# Patient Record
Sex: MALE | Race: WHITE | NOT HISPANIC OR LATINO | Employment: OTHER | URBAN - METROPOLITAN AREA
[De-identification: names, ages, dates, MRNs, and addresses within clinical notes are randomized per-mention and may not be internally consistent; named-entity substitution may affect disease eponyms.]

---

## 2017-12-28 ENCOUNTER — HOSPITAL ENCOUNTER (OUTPATIENT)
Facility: MEDICAL CENTER | Age: 62
DRG: 982 | End: 2017-12-28
Payer: MEDICARE

## 2017-12-28 ENCOUNTER — HOSPITAL ENCOUNTER (INPATIENT)
Facility: MEDICAL CENTER | Age: 62
LOS: 12 days | DRG: 982 | End: 2018-01-09
Attending: INTERNAL MEDICINE | Admitting: INTERNAL MEDICINE
Payer: MEDICARE

## 2017-12-28 ENCOUNTER — APPOINTMENT (OUTPATIENT)
Dept: RADIOLOGY | Facility: MEDICAL CENTER | Age: 62
DRG: 982 | End: 2017-12-28
Attending: INTERNAL MEDICINE
Payer: MEDICARE

## 2017-12-28 ENCOUNTER — RESOLUTE PROFESSIONAL BILLING HOSPITAL PROF FEE (OUTPATIENT)
Dept: HOSPITALIST | Facility: MEDICAL CENTER | Age: 62
End: 2017-12-28
Payer: MEDICARE

## 2017-12-28 DIAGNOSIS — L97.519 FOOT ULCER, RIGHT, WITH UNSPECIFIED SEVERITY (HCC): ICD-10-CM

## 2017-12-28 DIAGNOSIS — M86.271 SUBACUTE OSTEOMYELITIS OF RIGHT FOOT (HCC): ICD-10-CM

## 2017-12-28 DIAGNOSIS — M86.9 OSTEOMYELITIS OF RIGHT FOOT, UNSPECIFIED TYPE (HCC): ICD-10-CM

## 2017-12-28 PROBLEM — Z79.01 CHRONIC ANTICOAGULATION: Status: ACTIVE | Noted: 2017-12-28

## 2017-12-28 LAB
ANION GAP SERPL CALC-SCNC: 8 MMOL/L (ref 0–11.9)
BASOPHILS # BLD AUTO: 0.7 % (ref 0–1.8)
BASOPHILS # BLD: 0.07 K/UL (ref 0–0.12)
BUN SERPL-MCNC: 11 MG/DL (ref 8–22)
CALCIUM SERPL-MCNC: 9.3 MG/DL (ref 8.5–10.5)
CHLORIDE SERPL-SCNC: 104 MMOL/L (ref 96–112)
CO2 SERPL-SCNC: 25 MMOL/L (ref 20–33)
CREAT SERPL-MCNC: 0.83 MG/DL (ref 0.5–1.4)
CRP SERPL HS-MCNC: 0.52 MG/DL (ref 0–0.75)
EOSINOPHIL # BLD AUTO: 0.32 K/UL (ref 0–0.51)
EOSINOPHIL NFR BLD: 3.3 % (ref 0–6.9)
ERYTHROCYTE [DISTWIDTH] IN BLOOD BY AUTOMATED COUNT: 44.1 FL (ref 35.9–50)
ERYTHROCYTE [SEDIMENTATION RATE] IN BLOOD BY WESTERGREN METHOD: 31 MM/HOUR (ref 0–20)
EST. AVERAGE GLUCOSE BLD GHB EST-MCNC: 232 MG/DL
GFR SERPL CREATININE-BSD FRML MDRD: >60 ML/MIN/1.73 M 2
GLUCOSE BLD-MCNC: 185 MG/DL (ref 65–99)
GLUCOSE BLD-MCNC: 212 MG/DL (ref 65–99)
GLUCOSE SERPL-MCNC: 163 MG/DL (ref 65–99)
HBA1C MFR BLD: 9.7 % (ref 0–5.6)
HCT VFR BLD AUTO: 45 % (ref 42–52)
HGB BLD-MCNC: 15 G/DL (ref 14–18)
IMM GRANULOCYTES # BLD AUTO: 0.07 K/UL (ref 0–0.11)
IMM GRANULOCYTES NFR BLD AUTO: 0.7 % (ref 0–0.9)
INR PPP: 1.49 (ref 0.87–1.13)
LYMPHOCYTES # BLD AUTO: 2.76 K/UL (ref 1–4.8)
LYMPHOCYTES NFR BLD: 28.2 % (ref 22–41)
MCH RBC QN AUTO: 28.2 PG (ref 27–33)
MCHC RBC AUTO-ENTMCNC: 33.3 G/DL (ref 33.7–35.3)
MCV RBC AUTO: 84.7 FL (ref 81.4–97.8)
MONOCYTES # BLD AUTO: 0.65 K/UL (ref 0–0.85)
MONOCYTES NFR BLD AUTO: 6.7 % (ref 0–13.4)
NEUTROPHILS # BLD AUTO: 5.9 K/UL (ref 1.82–7.42)
NEUTROPHILS NFR BLD: 60.4 % (ref 44–72)
NRBC # BLD AUTO: 0 K/UL
NRBC BLD-RTO: 0 /100 WBC
PLATELET # BLD AUTO: 232 K/UL (ref 164–446)
PMV BLD AUTO: 9.3 FL (ref 9–12.9)
POTASSIUM SERPL-SCNC: 3.9 MMOL/L (ref 3.6–5.5)
PREALB SERPL-MCNC: 25 MG/DL (ref 18–38)
PROTHROMBIN TIME: 17.7 SEC (ref 12–14.6)
RBC # BLD AUTO: 5.31 M/UL (ref 4.7–6.1)
SODIUM SERPL-SCNC: 137 MMOL/L (ref 135–145)
WBC # BLD AUTO: 9.8 K/UL (ref 4.8–10.8)

## 2017-12-28 PROCEDURE — 700111 HCHG RX REV CODE 636 W/ 250 OVERRIDE (IP): Performed by: INTERNAL MEDICINE

## 2017-12-28 PROCEDURE — 36415 COLL VENOUS BLD VENIPUNCTURE: CPT

## 2017-12-28 PROCEDURE — 85652 RBC SED RATE AUTOMATED: CPT

## 2017-12-28 PROCEDURE — 99223 1ST HOSP IP/OBS HIGH 75: CPT | Mod: AI | Performed by: INTERNAL MEDICINE

## 2017-12-28 PROCEDURE — 84134 ASSAY OF PREALBUMIN: CPT

## 2017-12-28 PROCEDURE — 94760 N-INVAS EAR/PLS OXIMETRY 1: CPT

## 2017-12-28 PROCEDURE — 83036 HEMOGLOBIN GLYCOSYLATED A1C: CPT

## 2017-12-28 PROCEDURE — 86140 C-REACTIVE PROTEIN: CPT

## 2017-12-28 PROCEDURE — 73630 X-RAY EXAM OF FOOT: CPT | Mod: RT

## 2017-12-28 PROCEDURE — 770006 HCHG ROOM/CARE - MED/SURG/GYN SEMI*

## 2017-12-28 PROCEDURE — 82962 GLUCOSE BLOOD TEST: CPT

## 2017-12-28 PROCEDURE — A9270 NON-COVERED ITEM OR SERVICE: HCPCS | Performed by: INTERNAL MEDICINE

## 2017-12-28 PROCEDURE — 80048 BASIC METABOLIC PNL TOTAL CA: CPT

## 2017-12-28 PROCEDURE — 85610 PROTHROMBIN TIME: CPT

## 2017-12-28 PROCEDURE — 85025 COMPLETE CBC W/AUTO DIFF WBC: CPT

## 2017-12-28 PROCEDURE — 700102 HCHG RX REV CODE 250 W/ 637 OVERRIDE(OP): Performed by: INTERNAL MEDICINE

## 2017-12-28 RX ORDER — ONDANSETRON 4 MG/1
4 TABLET, ORALLY DISINTEGRATING ORAL EVERY 4 HOURS PRN
Status: DISCONTINUED | OUTPATIENT
Start: 2017-12-28 | End: 2018-01-09 | Stop reason: HOSPADM

## 2017-12-28 RX ORDER — PROMETHAZINE HYDROCHLORIDE 12.5 MG/1
12.5-25 SUPPOSITORY RECTAL EVERY 4 HOURS PRN
Status: DISCONTINUED | OUTPATIENT
Start: 2017-12-28 | End: 2018-01-09 | Stop reason: HOSPADM

## 2017-12-28 RX ORDER — ISOSORBIDE DINITRATE 10 MG/1
20 TABLET ORAL DAILY
Status: DISCONTINUED | OUTPATIENT
Start: 2017-12-29 | End: 2018-01-09 | Stop reason: HOSPADM

## 2017-12-28 RX ORDER — PROMETHAZINE HYDROCHLORIDE 25 MG/1
12.5-25 TABLET ORAL EVERY 4 HOURS PRN
Status: DISCONTINUED | OUTPATIENT
Start: 2017-12-28 | End: 2018-01-09 | Stop reason: HOSPADM

## 2017-12-28 RX ORDER — WARFARIN SODIUM 10 MG/1
10 TABLET ORAL DAILY
COMMUNITY

## 2017-12-28 RX ORDER — DEXTROSE MONOHYDRATE 25 G/50ML
25 INJECTION, SOLUTION INTRAVENOUS
Status: DISCONTINUED | OUTPATIENT
Start: 2017-12-28 | End: 2018-01-09 | Stop reason: HOSPADM

## 2017-12-28 RX ORDER — ONDANSETRON 2 MG/ML
4 INJECTION INTRAMUSCULAR; INTRAVENOUS EVERY 4 HOURS PRN
Status: DISCONTINUED | OUTPATIENT
Start: 2017-12-28 | End: 2018-01-09 | Stop reason: HOSPADM

## 2017-12-28 RX ORDER — CLOPIDOGREL BISULFATE 75 MG/1
75 TABLET ORAL DAILY
Status: DISCONTINUED | OUTPATIENT
Start: 2017-12-28 | End: 2018-01-09 | Stop reason: HOSPADM

## 2017-12-28 RX ORDER — NIFEDIPINE 30 MG/1
30 TABLET, EXTENDED RELEASE ORAL DAILY
Status: DISCONTINUED | OUTPATIENT
Start: 2017-12-29 | End: 2018-01-09 | Stop reason: HOSPADM

## 2017-12-28 RX ORDER — PENTOXIFYLLINE 400 MG/1
400 TABLET, EXTENDED RELEASE ORAL
Status: DISCONTINUED | OUTPATIENT
Start: 2017-12-28 | End: 2018-01-09 | Stop reason: HOSPADM

## 2017-12-28 RX ORDER — METRONIDAZOLE 500 MG/1
500 TABLET ORAL EVERY 8 HOURS
Status: DISCONTINUED | OUTPATIENT
Start: 2017-12-28 | End: 2017-12-28

## 2017-12-28 RX ORDER — GLIMEPIRIDE 2 MG/1
2 TABLET ORAL 2 TIMES DAILY
COMMUNITY

## 2017-12-28 RX ORDER — AMOXICILLIN 250 MG
2 CAPSULE ORAL 2 TIMES DAILY
Status: DISCONTINUED | OUTPATIENT
Start: 2017-12-28 | End: 2018-01-09 | Stop reason: HOSPADM

## 2017-12-28 RX ORDER — SULFAMETHOXAZOLE AND TRIMETHOPRIM 400; 80 MG/1; MG/1
1 TABLET ORAL 2 TIMES DAILY
Status: ON HOLD | COMMUNITY
End: 2018-01-09

## 2017-12-28 RX ORDER — POLYETHYLENE GLYCOL 3350 17 G/17G
1 POWDER, FOR SOLUTION ORAL
Status: DISCONTINUED | OUTPATIENT
Start: 2017-12-28 | End: 2018-01-09 | Stop reason: HOSPADM

## 2017-12-28 RX ORDER — INSULIN GLARGINE 100 [IU]/ML
24 INJECTION, SOLUTION SUBCUTANEOUS EVERY EVENING
Status: DISCONTINUED | OUTPATIENT
Start: 2017-12-28 | End: 2018-01-02

## 2017-12-28 RX ORDER — PANTOPRAZOLE SODIUM 40 MG/1
40 TABLET, DELAYED RELEASE ORAL DAILY
Status: ON HOLD | COMMUNITY
End: 2018-01-09

## 2017-12-28 RX ORDER — ATENOLOL 50 MG/1
50 TABLET ORAL 3 TIMES DAILY
Status: DISCONTINUED | OUTPATIENT
Start: 2017-12-28 | End: 2018-01-06

## 2017-12-28 RX ORDER — AMOXICILLIN AND CLAVULANATE POTASSIUM 875; 125 MG/1; MG/1
1 TABLET, FILM COATED ORAL 2 TIMES DAILY
Status: ON HOLD | COMMUNITY
End: 2018-01-09

## 2017-12-28 RX ORDER — ACETAMINOPHEN 325 MG/1
650 TABLET ORAL EVERY 6 HOURS PRN
Status: DISCONTINUED | OUTPATIENT
Start: 2017-12-28 | End: 2018-01-09 | Stop reason: HOSPADM

## 2017-12-28 RX ORDER — FUROSEMIDE 40 MG/1
10 TABLET ORAL DAILY
Status: ON HOLD | COMMUNITY
End: 2018-01-09

## 2017-12-28 RX ORDER — FUROSEMIDE 20 MG/1
10 TABLET ORAL DAILY
Status: DISCONTINUED | OUTPATIENT
Start: 2017-12-29 | End: 2018-01-09 | Stop reason: HOSPADM

## 2017-12-28 RX ORDER — GABAPENTIN 100 MG/1
100 CAPSULE ORAL 3 TIMES DAILY
Status: DISCONTINUED | OUTPATIENT
Start: 2017-12-28 | End: 2018-01-09 | Stop reason: HOSPADM

## 2017-12-28 RX ORDER — PANTOPRAZOLE SODIUM 40 MG/1
40 TABLET, DELAYED RELEASE ORAL DAILY
Status: DISCONTINUED | OUTPATIENT
Start: 2017-12-28 | End: 2017-12-28

## 2017-12-28 RX ORDER — ATORVASTATIN CALCIUM 20 MG/1
20 TABLET, FILM COATED ORAL
Status: DISCONTINUED | OUTPATIENT
Start: 2017-12-28 | End: 2018-01-09 | Stop reason: HOSPADM

## 2017-12-28 RX ORDER — OMEPRAZOLE 20 MG/1
20 CAPSULE, DELAYED RELEASE ORAL DAILY
Status: DISCONTINUED | OUTPATIENT
Start: 2017-12-29 | End: 2018-01-09 | Stop reason: HOSPADM

## 2017-12-28 RX ORDER — BISACODYL 10 MG
10 SUPPOSITORY, RECTAL RECTAL
Status: DISCONTINUED | OUTPATIENT
Start: 2017-12-28 | End: 2018-01-09 | Stop reason: HOSPADM

## 2017-12-28 RX ADMIN — INSULIN HUMAN 1 UNITS: 100 INJECTION, SOLUTION PARENTERAL at 21:13

## 2017-12-28 RX ADMIN — INSULIN HUMAN 2 UNITS: 100 INJECTION, SOLUTION PARENTERAL at 16:41

## 2017-12-28 RX ADMIN — INSULIN GLARGINE 24 UNITS: 100 INJECTION, SOLUTION SUBCUTANEOUS at 21:14

## 2017-12-28 RX ADMIN — ENOXAPARIN SODIUM 100 MG: 100 INJECTION SUBCUTANEOUS at 21:13

## 2017-12-28 RX ADMIN — ATORVASTATIN CALCIUM 20 MG: 20 TABLET, FILM COATED ORAL at 21:14

## 2017-12-28 RX ADMIN — ATENOLOL 50 MG: 50 TABLET ORAL at 21:14

## 2017-12-28 RX ADMIN — GABAPENTIN 100 MG: 100 CAPSULE ORAL at 14:08

## 2017-12-28 RX ADMIN — STANDARDIZED SENNA CONCENTRATE AND DOCUSATE SODIUM 2 TABLET: 8.6; 5 TABLET, FILM COATED ORAL at 21:14

## 2017-12-28 RX ADMIN — ATENOLOL 50 MG: 50 TABLET ORAL at 14:08

## 2017-12-28 RX ADMIN — CLOPIDOGREL 75 MG: 75 TABLET, FILM COATED ORAL at 21:14

## 2017-12-28 RX ADMIN — PENTOXIFYLLINE 400 MG: 400 TABLET, FILM COATED, EXTENDED RELEASE ORAL at 16:41

## 2017-12-28 RX ADMIN — GABAPENTIN 100 MG: 100 CAPSULE ORAL at 21:14

## 2017-12-28 ASSESSMENT — ENCOUNTER SYMPTOMS
WEIGHT LOSS: 0
ABDOMINAL PAIN: 0
VOMITING: 0
SHORTNESS OF BREATH: 0
CHILLS: 0
NAUSEA: 0
BLURRED VISION: 0
FOCAL WEAKNESS: 0
DIZZINESS: 0
MYALGIAS: 0
LOSS OF CONSCIOUSNESS: 0
DEPRESSION: 0
PALPITATIONS: 0
HEMOPTYSIS: 0
FEVER: 0
SPUTUM PRODUCTION: 0

## 2017-12-28 ASSESSMENT — LIFESTYLE VARIABLES
PACK_YEARS: 20
EVER_SMOKED: YES
ALCOHOL_USE: NO
EVER_SMOKED: YES

## 2017-12-28 ASSESSMENT — PATIENT HEALTH QUESTIONNAIRE - PHQ9
2. FEELING DOWN, DEPRESSED, IRRITABLE, OR HOPELESS: NOT AT ALL
SUM OF ALL RESPONSES TO PHQ QUESTIONS 1-9: 0
1. LITTLE INTEREST OR PLEASURE IN DOING THINGS: NOT AT ALL
SUM OF ALL RESPONSES TO PHQ9 QUESTIONS 1 AND 2: 0

## 2017-12-28 ASSESSMENT — PAIN SCALES - GENERAL
PAINLEVEL_OUTOF10: 0
PAINLEVEL_OUTOF10: 0

## 2017-12-28 ASSESSMENT — COPD QUESTIONNAIRES
COPD SCREENING SCORE: 4
DURING THE PAST 4 WEEKS HOW MUCH DID YOU FEEL SHORT OF BREATH: NONE/LITTLE OF THE TIME
HAVE YOU SMOKED AT LEAST 100 CIGARETTES IN YOUR ENTIRE LIFE: YES
DO YOU EVER COUGH UP ANY MUCUS OR PHLEGM?: NO/ONLY WITH OCCASIONAL COLDS OR INFECTIONS

## 2017-12-28 NOTE — CONSULTS
Diabetes Education:  Barrett states that he had diabetes education when he was here 2/26/16.  He states he has been running high at home.  He has a new doctor that is trying to help get his blood sugar under control.  He was just taking Metformin and she added glipizide.  I reviewed the new medications including SGLT 2 inhibitors and GLP 1.  He did not want insulin and states he won't take it.  I showed him how small the needles are now and he states he feel better.  I reviewed all basic education including diet, exercise, medications, checking blood sugars, signs/symptoms and treatment of hypoglycemia, daily foot care, and need for follow up care.  We will do insulin education tomorrow when he is settled and has gotten his first injections in the hospital.  He states he does not have an appetite and is only eating once daily at home.  He was supplied an One Touch Verio Flex meter and was able to do a return demonstration without difficulty.  He states he will need to get set up with a [employer] order company since he lives in [place].  He will need a prescription for the One Touch Verio test strips and Delica lancets.

## 2017-12-28 NOTE — ASSESSMENT & PLAN NOTE
- S/p toe amputations.   - Await SNF placement for long term antibiotics (IV daptomycin) until 2/9/18.  - continue wound care per LPS.   - Continue pain control with PRN oxycodone.

## 2017-12-28 NOTE — CARE PLAN
Problem: Knowledge Deficit  Goal: Knowledge of disease process/condition, treatment plan, diagnostic tests, and medications will improve    Intervention: Explain information regarding disease process/condition, treatment plan, diagnostic tests, and medications and document in education  Limb preservation consult

## 2017-12-28 NOTE — H&P
HOSPITAL MEDICINE HISTORY/ PHYSICAL    Date of Service:  12/28/2017   1:25 PM       Patient ID:   Name: Barrett Frederick  . YOB: 1955. Age: 62 y.o. male. MRN: [id number]    Admitting Attending:  Subhash Brannon     PCP : Jesse Ramachandran M.D.          Chief Complaint:       Admitted to the hospital for possible right foot osteomyelitis    History of Present Illness:    Otoniel is a 62 y.o. male w/h/o peripheral arterial disease along with poorly controlled diabetes mellitus who presents with above chief complaint. States that he has had multiple blood clots in the last along with a new one a couple months ago and therefore his Lovenox and Coumadin given that he's been on antibiotics for his right foot issues for the last 2 weeks that were affecting his INR. Patient claims that the right foot ulcer at the base of the 5th toe is action improved tremendously but is not fully gone away and her concern for possible bone infection. He endorses some tingling sensation of his feet but denies any overt numbness and says his pain is quite well controlled. Denies any systemic symptoms such as nausea vomiting fevers chills or diarrhea. He does endorse a good appetite. Tolerating antibiotics without issue and does live in [facility].    Review of Systems:    Has Review of Systems   Constitutional: Negative for chills, fever and weight loss.   HENT: Negative for hearing loss.    Eyes: Negative for blurred vision.   Respiratory: Negative for hemoptysis, sputum production and shortness of breath.    Cardiovascular: Positive for leg swelling. Negative for chest pain and palpitations.   Gastrointestinal: Negative for abdominal pain, nausea and vomiting.   Genitourinary: Negative for dysuria and urgency.   Musculoskeletal: Negative for myalgias.   Skin: Negative for itching.   Neurological: Negative for dizziness, focal weakness and loss of consciousness.   Psychiatric/Behavioral: Negative for depression.   All other systems  reviewed and are negative.    Please see HPI, all other systems were reviewed and are negative (AMA/CMS criteria)              Past Medical/ Family / Social history (PFSH):   Past Medical History:   Diagnosis Date   • CAD (coronary artery disease)    • Chronic obstructive pulmonary disease    • Congestive heart failure    • Diabetes        Past Surgical History:   Procedure Laterality Date   • THROMBECTOMY Left 2/24/2016    Procedure: THROMBECTOMY;  Surgeon: Amber Botello M.D.;  Location: SURGERY [facility];  Service:        Current Outpatient Medications:  No current facility-administered medications on file prior to encounter.      Current Outpatient Prescriptions on File Prior to Encounter   Medication Sig Dispense Refill   • gabapentin (NEURONTIN) 100 MG Cap Take 1 Cap by mouth 3 times a day. (Patient taking differently: Take 300 mg by mouth 3 times a day.) 90 Cap 3   • atorvastatin (LIPITOR) 20 MG Tab Take 1 Tab by mouth every bedtime. (Patient taking differently: Take 20 mg by mouth every day.) 30 Tab 11   • insulin glargine (LANTUS) 100 UNIT/ML Solution Inject 24 Units as instructed every evening. 10 mL 3   • insulin lispro (HUMALOG) 100 UNIT/ML Solution Inject 1-6 Units as instructed 3 times a day before meals. 100 mL 3   • metformin (GLUCOPHAGE) 1000 MG tablet Take 1,000 mg by mouth 2 times a day, with meals.     • atenolol (TENORMIN) 50 MG Tab Take 50 mg by mouth 3 times a day.     • aspirin EC (ECOTRIN) 81 MG Tablet Delayed Response Take 81 mg by mouth every day.     • omeprazole (PRILOSEC) 20 MG delayed-release capsule Take 20 mg by mouth every day.     • clopidogrel (PLAVIX) 75 MG Tab Take 75 mg by mouth every day.     • pentoxifylline CR (TRENTAL) 400 MG CR tablet Take 400 mg by mouth 3 times a day, with meals.     • isosorbide dinitrate (ISORDIL) 20 MG Tab Take 20 mg by mouth every day.     • NIFEdipine (ADALAT CC) 30 MG CR tablet Take 30 mg by mouth every day.     • acetaminophen (TYLENOL)  "325 MG Tab Take 1,950 mg by mouth every 6 hours as needed.         Medication Allergy/Sensitivities:  No Known Allergies    Family History:  Endorses family history of diabetes     Social History:  Social History   Substance Use Topics   • Smoking status: Current Every Day Smoker     Packs/day: 2.00     Types: Cigarettes   • Smokeless tobacco: Not on file   • Alcohol use Yes     #################################################################  Physical Exam:   Vitals/ General Appearance:   Weight/BMI: Body mass index is 31.97 kg/m².  Blood pressure 100/75, temperature 36.3 °C (97.3 °F), resp. rate 20, height 1.753 m (5' 9\"), weight 98.2 kg (216 lb 7.9 oz).   Vitals:    12/28/17 1139   BP: 100/75   Resp: 20   Temp: 36.3 °C (97.3 °F)   Weight: 98.2 kg (216 lb 7.9 oz)   Height: 1.753 m (5' 9\")    Oxygen Therapy:       Constitutional:  well developed, well nourished, non-toxic, no acute distress  HENMT: Normocephalic, atraumatic, b/l ears normal, nose normal  Eyes:  EOMI, conjunctiva normal, no discharge  Neck: no tracheal deviation, supple  Cardiovascular: normal heart rate, normal rhythm, no murmurs, no rubs or gallops; no cyanosis, clubbing, 2+ pitting edema to bilateral lower extremity is equal and symmetric  Lungs: Respiratory effort is normal, normal breath sounds, breath sounds clear to auscultation b/l, no rales, rhonchi or wheezing  Abdomen: soft, non-tender, no guarding or rebound  Skin: warm, dry, no erythema, no rash, there is a 1 cm clean based ulcer with no purulent discharge at the base of the right 5th right toe, no fluctuance appreciated and no erythema  Neurologic: Alert and oriented, strength5 out of 5 throughout, no focal deficits, CN II-XII normal  Psychiatric: No anxiety or depression    #################################################################  Lab Data Review:    Objective   No results found for this or any previous visit (from the past 24 hour(s)).    (click the triangle to expand " results)    My interpretation of lab results:     Imaging/Procedures Review:    DX-FOOT-COMPLETE 3+ RIGHT    (Results Pending)   ARTERIAL EVALUATION LOWER EXTREMITY ([facility] Only)    (Results Pending)       EKG:   per my independent read:  None performed    Assessment and Plan:      * Osteomyelitis of right foot (CMS-HCC)- (present on admission)   Assessment & Plan    -Check ESR and CRP  -Get limb preservation services involved  -Hold outpatient antibiotics at this time given that ulcer appears well-healed and he has no systemic symptoms of actual infection at this time  -X-ray the foot 3 view to rule out osteomyelitis consider MRI as well  -Arterial study of the bilateral lower extremities to evaluate possible blood flow  -Consider orthopedic consultation  -Appropriate pain control blood sugar control as outlined above        Chronic anticoagulation- (present on admission)   Assessment & Plan    -Check INR, hold outpatient Coumadin given possible surgical intervention and continue outpatient weight-based Lovenox        CHF (congestive heart failure) (CMS-HCC)- (present on admission)   Assessment & Plan    -Currently well compensated continue outpatient medications        DM (diabetes mellitus) (CMS-HCC)- (present on admission)   Assessment & Plan    -Check a glycohemoglobin  -Continue outpatient Lantus along with insulin signs, just as needed to maintain blood sugar below 140        COPD (chronic obstructive pulmonary disease) (CMS-HCC)- (present on admission)   Assessment & Plan    -At his baseline with no current exacerbation, do respiratory therapy protocol and continue outpatient bronchodilators as needed        Coronary artery disease- (present on admission)   Assessment & Plan    -No current chest pain continue outpatient cardiac medications        Essential hypertension- (present on admission)   Assessment & Plan    -Well-controlled continue outpatient blood pressure medication        PAOD  (peripheral arterial occlusive disease) (CMS-HCC)- (present on admission)   Assessment & Plan    -Continue outpatient medications and monitor closely, arterial studies outlined above              1. Prophylaxis: lovenox  2. Code: Full code per patient with himself present  3. Dispo: He will be admitted to inpatient for management that is expected to take greater than 2 midnights    This dictation was created using voice recognition software. The accuracy of the dictation is limited to the abilities of the software. I expect there may be some errors of grammar and possibly content.

## 2017-12-28 NOTE — PROGRESS NOTES
Direct admit from MD office. Accepted by Dr. Brannon for osteomyelitis of rt foot.  ADT signed & held, needs to be released upon pt arrival.  No written orders received.  Pt coming by private car.

## 2017-12-28 NOTE — ASSESSMENT & PLAN NOTE
- Well-controlled.  - continue outpatient blood pressure medication (nifedipine, isordil, lasix). Off atenolol due to bradycardia.

## 2017-12-28 NOTE — ASSESSMENT & PLAN NOTE
- for recurrent LE DVTs. INR still subtherapeutic. Continue lovenox bridge and coumadin until INR >2.

## 2017-12-28 NOTE — PROGRESS NOTES
Arrived to unit walking, AAOx4, denies any discomfort, Dr. Storey paged and in to admit, awaiting orders, med rec and admit profile done, npo at this time per mD.

## 2017-12-28 NOTE — ASSESSMENT & PLAN NOTE
- stable. No ACS. Patient had remote history of stent.  - continue plavix, statin, isordil. Off beta blocker due to bradycardia.

## 2017-12-28 NOTE — CARE PLAN
Problem: Nutritional:  Goal: Patient to verbalize or demonstrate understanding of diet  Outcome: MET Date Met: 12/28/17  Pt refused DM diet education. Pt accepted handout

## 2017-12-28 NOTE — ASSESSMENT & PLAN NOTE
- Continue lantus 32 units HS, and resume home dose metformin. Goal achieve F BG<150.   - Continue SSI. Accuchecks AC and HS.

## 2017-12-29 LAB
ANION GAP SERPL CALC-SCNC: 7 MMOL/L (ref 0–11.9)
BASOPHILS # BLD AUTO: 0.7 % (ref 0–1.8)
BASOPHILS # BLD: 0.07 K/UL (ref 0–0.12)
BUN SERPL-MCNC: 10 MG/DL (ref 8–22)
CALCIUM SERPL-MCNC: 8.4 MG/DL (ref 8.5–10.5)
CHLORIDE SERPL-SCNC: 106 MMOL/L (ref 96–112)
CO2 SERPL-SCNC: 21 MMOL/L (ref 20–33)
CREAT SERPL-MCNC: 0.81 MG/DL (ref 0.5–1.4)
EKG IMPRESSION: NORMAL
EOSINOPHIL # BLD AUTO: 0.4 K/UL (ref 0–0.51)
EOSINOPHIL NFR BLD: 4 % (ref 0–6.9)
ERYTHROCYTE [DISTWIDTH] IN BLOOD BY AUTOMATED COUNT: 44.1 FL (ref 35.9–50)
GFR SERPL CREATININE-BSD FRML MDRD: >60 ML/MIN/1.73 M 2
GLUCOSE BLD-MCNC: 115 MG/DL (ref 65–99)
GLUCOSE BLD-MCNC: 172 MG/DL (ref 65–99)
GLUCOSE BLD-MCNC: 204 MG/DL (ref 65–99)
GLUCOSE SERPL-MCNC: 246 MG/DL (ref 65–99)
HCT VFR BLD AUTO: 39.1 % (ref 42–52)
HGB BLD-MCNC: 12.9 G/DL (ref 14–18)
IMM GRANULOCYTES # BLD AUTO: 0.06 K/UL (ref 0–0.11)
IMM GRANULOCYTES NFR BLD AUTO: 0.6 % (ref 0–0.9)
LYMPHOCYTES # BLD AUTO: 2.31 K/UL (ref 1–4.8)
LYMPHOCYTES NFR BLD: 23.3 % (ref 22–41)
MCH RBC QN AUTO: 28 PG (ref 27–33)
MCHC RBC AUTO-ENTMCNC: 33 G/DL (ref 33.7–35.3)
MCV RBC AUTO: 85 FL (ref 81.4–97.8)
MONOCYTES # BLD AUTO: 0.72 K/UL (ref 0–0.85)
MONOCYTES NFR BLD AUTO: 7.3 % (ref 0–13.4)
NEUTROPHILS # BLD AUTO: 6.35 K/UL (ref 1.82–7.42)
NEUTROPHILS NFR BLD: 64.1 % (ref 44–72)
NRBC # BLD AUTO: 0 K/UL
NRBC BLD-RTO: 0 /100 WBC
PLATELET # BLD AUTO: 197 K/UL (ref 164–446)
PMV BLD AUTO: 9.6 FL (ref 9–12.9)
POTASSIUM SERPL-SCNC: 4.1 MMOL/L (ref 3.6–5.5)
RBC # BLD AUTO: 4.6 M/UL (ref 4.7–6.1)
SODIUM SERPL-SCNC: 134 MMOL/L (ref 135–145)
WBC # BLD AUTO: 9.9 K/UL (ref 4.8–10.8)

## 2017-12-29 PROCEDURE — 87186 SC STD MICRODIL/AGAR DIL: CPT | Mod: 91

## 2017-12-29 PROCEDURE — 0QBN0ZZ EXCISION OF RIGHT METATARSAL, OPEN APPROACH: ICD-10-PCS | Performed by: ORTHOPAEDIC SURGERY

## 2017-12-29 PROCEDURE — A9270 NON-COVERED ITEM OR SERVICE: HCPCS | Performed by: STUDENT IN AN ORGANIZED HEALTH CARE EDUCATION/TRAINING PROGRAM

## 2017-12-29 PROCEDURE — 87075 CULTR BACTERIA EXCEPT BLOOD: CPT | Mod: 91

## 2017-12-29 PROCEDURE — 87077 CULTURE AEROBIC IDENTIFY: CPT | Mod: 91

## 2017-12-29 PROCEDURE — 500881 HCHG PACK, EXTREMITY: Performed by: ORTHOPAEDIC SURGERY

## 2017-12-29 PROCEDURE — 160009 HCHG ANES TIME/MIN: Performed by: ORTHOPAEDIC SURGERY

## 2017-12-29 PROCEDURE — 700111 HCHG RX REV CODE 636 W/ 250 OVERRIDE (IP): Performed by: INTERNAL MEDICINE

## 2017-12-29 PROCEDURE — A6454 SELF-ADHER BAND W>=3" <5"/YD: HCPCS | Performed by: ORTHOPAEDIC SURGERY

## 2017-12-29 PROCEDURE — 87015 SPECIMEN INFECT AGNT CONCNTJ: CPT

## 2017-12-29 PROCEDURE — A6407 PACKING STRIPS, NON-IMPREG: HCPCS | Performed by: ORTHOPAEDIC SURGERY

## 2017-12-29 PROCEDURE — 80048 BASIC METABOLIC PNL TOTAL CA: CPT

## 2017-12-29 PROCEDURE — 85025 COMPLETE CBC W/AUTO DIFF WBC: CPT

## 2017-12-29 PROCEDURE — 501330 HCHG SET, CYSTO IRRIG TUBING: Performed by: ORTHOPAEDIC SURGERY

## 2017-12-29 PROCEDURE — 87205 SMEAR GRAM STAIN: CPT | Mod: 91

## 2017-12-29 PROCEDURE — 770006 HCHG ROOM/CARE - MED/SURG/GYN SEMI*

## 2017-12-29 PROCEDURE — 99232 SBSQ HOSP IP/OBS MODERATE 35: CPT | Performed by: INTERNAL MEDICINE

## 2017-12-29 PROCEDURE — 700102 HCHG RX REV CODE 250 W/ 637 OVERRIDE(OP): Performed by: INTERNAL MEDICINE

## 2017-12-29 PROCEDURE — 160039 HCHG SURGERY MINUTES - EA ADDL 1 MIN LEVEL 3: Performed by: ORTHOPAEDIC SURGERY

## 2017-12-29 PROCEDURE — 82962 GLUCOSE BLOOD TEST: CPT | Mod: 91

## 2017-12-29 PROCEDURE — A6402 STERILE GAUZE <= 16 SQ IN: HCPCS | Performed by: ORTHOPAEDIC SURGERY

## 2017-12-29 PROCEDURE — 36415 COLL VENOUS BLD VENIPUNCTURE: CPT

## 2017-12-29 PROCEDURE — 160048 HCHG OR STATISTICAL LEVEL 1-5: Performed by: ORTHOPAEDIC SURGERY

## 2017-12-29 PROCEDURE — 700111 HCHG RX REV CODE 636 W/ 250 OVERRIDE (IP)

## 2017-12-29 PROCEDURE — 700111 HCHG RX REV CODE 636 W/ 250 OVERRIDE (IP): Performed by: STUDENT IN AN ORGANIZED HEALTH CARE EDUCATION/TRAINING PROGRAM

## 2017-12-29 PROCEDURE — 160028 HCHG SURGERY MINUTES - 1ST 30 MINS LEVEL 3: Performed by: ORTHOPAEDIC SURGERY

## 2017-12-29 PROCEDURE — 0QBQ0ZZ EXCISION OF RIGHT TOE PHALANX, OPEN APPROACH: ICD-10-PCS | Performed by: ORTHOPAEDIC SURGERY

## 2017-12-29 PROCEDURE — 160035 HCHG PACU - 1ST 60 MINS PHASE I: Performed by: ORTHOPAEDIC SURGERY

## 2017-12-29 PROCEDURE — 700101 HCHG RX REV CODE 250

## 2017-12-29 PROCEDURE — A9270 NON-COVERED ITEM OR SERVICE: HCPCS | Performed by: INTERNAL MEDICINE

## 2017-12-29 PROCEDURE — 501838 HCHG SUTURE GENERAL: Performed by: ORTHOPAEDIC SURGERY

## 2017-12-29 PROCEDURE — 93925 LOWER EXTREMITY STUDY: CPT

## 2017-12-29 PROCEDURE — 160002 HCHG RECOVERY MINUTES (STAT): Performed by: ORTHOPAEDIC SURGERY

## 2017-12-29 PROCEDURE — 700105 HCHG RX REV CODE 258: Performed by: INTERNAL MEDICINE

## 2017-12-29 PROCEDURE — 700105 HCHG RX REV CODE 258

## 2017-12-29 PROCEDURE — 87070 CULTURE OTHR SPECIMN AEROBIC: CPT

## 2017-12-29 PROCEDURE — 87040 BLOOD CULTURE FOR BACTERIA: CPT

## 2017-12-29 PROCEDURE — 93922 UPR/L XTREMITY ART 2 LEVELS: CPT

## 2017-12-29 PROCEDURE — 700102 HCHG RX REV CODE 250 W/ 637 OVERRIDE(OP): Performed by: STUDENT IN AN ORGANIZED HEALTH CARE EDUCATION/TRAINING PROGRAM

## 2017-12-29 PROCEDURE — 93005 ELECTROCARDIOGRAM TRACING: CPT | Performed by: ORTHOPAEDIC SURGERY

## 2017-12-29 PROCEDURE — 0L8N0ZZ DIVISION OF RIGHT LOWER LEG TENDON, OPEN APPROACH: ICD-10-PCS | Performed by: ORTHOPAEDIC SURGERY

## 2017-12-29 PROCEDURE — 93010 ELECTROCARDIOGRAM REPORT: CPT | Performed by: INTERNAL MEDICINE

## 2017-12-29 PROCEDURE — A6222 GAUZE <=16 IN NO W/SAL W/O B: HCPCS | Performed by: ORTHOPAEDIC SURGERY

## 2017-12-29 RX ORDER — MORPHINE SULFATE 4 MG/ML
1-3 INJECTION, SOLUTION INTRAMUSCULAR; INTRAVENOUS
Status: DISCONTINUED | OUTPATIENT
Start: 2017-12-29 | End: 2018-01-02

## 2017-12-29 RX ORDER — SODIUM CHLORIDE 9 MG/ML
INJECTION, SOLUTION INTRAVENOUS
Status: COMPLETED
Start: 2017-12-29 | End: 2017-12-29

## 2017-12-29 RX ORDER — NAPROXEN 500 MG/1
250 TABLET ORAL 3 TIMES DAILY PRN
Status: DISCONTINUED | OUTPATIENT
Start: 2017-12-29 | End: 2018-01-02

## 2017-12-29 RX ADMIN — NAPROXEN 250 MG: 500 TABLET ORAL at 21:48

## 2017-12-29 RX ADMIN — MORPHINE SULFATE 2 MG: 4 INJECTION INTRAVENOUS at 22:12

## 2017-12-29 RX ADMIN — SODIUM CHLORIDE: 9 INJECTION, SOLUTION INTRAVENOUS at 10:15

## 2017-12-29 RX ADMIN — INSULIN HUMAN 1 UNITS: 100 INJECTION, SOLUTION PARENTERAL at 12:01

## 2017-12-29 RX ADMIN — PENTOXIFYLLINE 400 MG: 400 TABLET, FILM COATED, EXTENDED RELEASE ORAL at 08:33

## 2017-12-29 RX ADMIN — TAZOBACTAM SODIUM AND PIPERACILLIN SODIUM 3.38 G: 375; 3 INJECTION, SOLUTION INTRAVENOUS at 10:21

## 2017-12-29 RX ADMIN — GABAPENTIN 100 MG: 100 CAPSULE ORAL at 08:31

## 2017-12-29 RX ADMIN — CLOPIDOGREL 75 MG: 75 TABLET, FILM COATED ORAL at 20:40

## 2017-12-29 RX ADMIN — ISOSORBIDE DINITRATE 20 MG: 10 TABLET ORAL at 08:38

## 2017-12-29 RX ADMIN — NIFEDIPINE 30 MG: 30 TABLET, FILM COATED, EXTENDED RELEASE ORAL at 08:30

## 2017-12-29 RX ADMIN — SODIUM CHLORIDE: 9 INJECTION, SOLUTION INTRAVENOUS at 13:36

## 2017-12-29 RX ADMIN — GABAPENTIN 100 MG: 100 CAPSULE ORAL at 20:40

## 2017-12-29 RX ADMIN — FUROSEMIDE 10 MG: 20 TABLET ORAL at 08:31

## 2017-12-29 RX ADMIN — INSULIN HUMAN 2 UNITS: 100 INJECTION, SOLUTION PARENTERAL at 06:17

## 2017-12-29 RX ADMIN — OMEPRAZOLE 20 MG: 20 CAPSULE, DELAYED RELEASE ORAL at 08:30

## 2017-12-29 RX ADMIN — ACETAMINOPHEN 650 MG: 325 TABLET, FILM COATED ORAL at 21:15

## 2017-12-29 RX ADMIN — VANCOMYCIN HYDROCHLORIDE 2500 MG: 100 INJECTION, POWDER, LYOPHILIZED, FOR SOLUTION INTRAVENOUS at 11:08

## 2017-12-29 RX ADMIN — ENOXAPARIN SODIUM 100 MG: 100 INJECTION SUBCUTANEOUS at 08:30

## 2017-12-29 RX ADMIN — INSULIN GLARGINE 24 UNITS: 100 INJECTION, SOLUTION SUBCUTANEOUS at 20:39

## 2017-12-29 RX ADMIN — ATENOLOL 50 MG: 50 TABLET ORAL at 20:40

## 2017-12-29 RX ADMIN — ATORVASTATIN CALCIUM 20 MG: 20 TABLET, FILM COATED ORAL at 20:40

## 2017-12-29 RX ADMIN — PENTOXIFYLLINE 400 MG: 400 TABLET, FILM COATED, EXTENDED RELEASE ORAL at 20:39

## 2017-12-29 RX ADMIN — ATENOLOL 50 MG: 50 TABLET ORAL at 08:30

## 2017-12-29 ASSESSMENT — ENCOUNTER SYMPTOMS
PALPITATIONS: 0
DIZZINESS: 0
FEVER: 0
MYALGIAS: 0
VOMITING: 0
HEADACHES: 0
HEARTBURN: 0
BLURRED VISION: 0
ORTHOPNEA: 0
COUGH: 0
NAUSEA: 0
SHORTNESS OF BREATH: 0
CHILLS: 0
MYALGIAS: 1
ABDOMINAL PAIN: 0

## 2017-12-29 ASSESSMENT — PAIN SCALES - GENERAL
PAINLEVEL_OUTOF10: 0
PAINLEVEL_OUTOF10: 9
PAINLEVEL_OUTOF10: 0
PAINLEVEL_OUTOF10: 0

## 2017-12-29 NOTE — WOUND TEAM
"[facility]  Inpatient Services  Initial Wound & Skin Care Evaluation    LIMB PRESERVATION SERVICE NOTE:    Wound(s): Right Plantar Diabetic Foot Wound     - Probing to Bone 4th MTJ  Start of Care: 12/29/2017       Subjective:      History of Present Illness:   Patient is a 62 y.o. male with history of Diabetes, Congestive heart failure, Chronic obstructive pulmonary disease, CAD (coronary artery disease), hospitalized a few months ago for multiple blood clots in [facility].     Admitted for osteomyelitis of rt foot    Patient presented with a right foot ulcer at the base of the 4th toe on the plantar aspect, that has been being treated by a PT in his home of [place] for the past couple of months when he noticed it. Patient states the wound improved tremendously but is not fully gone away                 Patient denies fevers chills, nausea, vomiting.     Patient wished to be referred to as female and with the name \"Gale\"      Pain:        States has no pain, feels pressure when probing wound.        Objective:        PHYSICAL EXAMINATION:     General Appearance:  Well developed,  nourished, in no acute distress    Sensory Assessment       Patient sensation insensate bilaterally with light touch 5 of 9 points on plantar aspect, but can feel dorsal surface touch.        Vascular Assessment     TATY Pending    Orthotic, protective, supportive devices:     Offloading  Will consult with ortho techs for diabetic shoe.    Vitals    /72   Pulse (!) 57   Temp 36.4 °C (97.5 °F)   Resp 18   Ht 1.753 m (5' 9\")   Wt 98.2 kg (216 lb 7.9 oz)   SpO2 95%   BMI 31.97 kg/m²      Wound Characteristics                                                    Location: Right Foot Diabetic Foot Wound   Initial Evaluation  Date:12/29/2017   Tissue Type and %: 100% Red   Periwound: Calloused   Drainage: Scant Purulant   Exposed structures None   Wound Edges:   Rolled   Odor: None   S&S of Infection:   Erythremia, Edema   Edema:  " Generalized Bilat LEs   Sensation: Insensate to 5 of 9 monofiliment               Measurements: Initial Evaluation  Date:12/29/2017   Length (cm) 0.6   Width (cm) 0.8   Depth (cm) 2.2   Tract/undermine 1.2 Medially (9 o'clock)  Probes to Bone           Tests and Measures:    Labs  Recent Labs      12/28/17   1354  12/29/17   0211   WBC  9.8  9.9   RBC  5.31  4.60*   HEMOGLOBIN  15.0  12.9*   HEMATOCRIT  45.0  39.1*   MCV  84.7  85.0   MCH  28.2  28.0   MCHC  33.3*  33.0*   RDW  44.1  44.1   PLATELETCT  232  197   MPV  9.3  9.6     Recent Labs      12/28/17   1354  12/29/17   0211   SODIUM  137  134*   POTASSIUM  3.9  4.1   CHLORIDE  104  106   CO2  25  21   GLUCOSE  163*  246*   BUN  11  10       A1C 9.7% Uncontrolled Diabetes Avg. 232  ESR: 31  CRP: 0.52    TATY  Pending    Imaging    X-Ray: 12/28/17 R Foot   Distal lateral soft tissue swelling and gas consistent with infection.  Destruction of the distal fourth metatarsal base of the proximal phalanx consistent with osteomyelitis.  Possible osteomyelitis involving the distal fifth metatarsal base of the fifth proximal phalanx    Infection Management  Microbiology NA    Antibiotics: Zosyn       Procedures:     Debridement :  None   Cleansed with:  NS Irrigation                                                         Periwound protected with: No Sting   Primary dressing: Adhesive Foam   Secondary Dressing:   Other:      Patient Education: Diabetes, Osteomyelitis,  consulted    Professional Collaboration: ID consulted, Dr. Villegas consulted,  LPS JENNIFER Maldonado      Assessment:      Wound etiology: Diabetic foot wound complicated with blood flow    Wound Progress:  Initial Assessment     Rationale for Treatment: Foam Dressing to reduce pressure    Patient tolerance/compliance:  Patient does not wish to change diet or smoking status    Complicating factors: uncontrolled Diabetes, PAD,    Need for ongoing  Wound Care: Nursing to do daily dressing changes , wound care signing  off.      Plan:      Treatment Plan and Recommendations:    Procedures: Nursing to cleanse wound and periwound with  NS.  Pat periwound dry with dry gauze 4x4.  Apply adhesive foam to wound. Change foam daily. Contact Wound Care if wound deteriorates or fails to progress      Frequency: Daily    ID consulted  AB per ID    Orthotics ordered: diabetic shoe    TATY pending for Surgical Consult vs wound care    Diabetes/nutrition education ordered.        RSKIN: CURRENT (X) ORDERED (O)  Q shift Freddy:  X  Q shift pressure point assessments:  X  Pressure redistribution mattress      X  JACKLYN      Bariatric JACKLYN      Bariatric foam        Heel float boots       Heels floated on pillows      Barrier wipes      Barrier Cream      Barrier paste      Sacral silicone dressing      Padded O2 tubing      Anchorfast      Trach with Optifoam split foam       Waffle cushion      Rectal tube or BMS      Antifungal tx    Turn q 2 hours  Up to chair  Ambulate   PT/OT     Dietician      PO     TF   TPN     PVN    NPO   # days   Other     Anticipated discharge plans (X):  SNF:           Home Care:           Outpatient Wound Center:            Self Care:            Other:             TBD:X

## 2017-12-29 NOTE — RESPIRATORY CARE
COPD EDUCATION by COPD CLINICAL EDUCATOR  12/29/2017 at 7:12 AM by Crystal Bautista     Patient reviewed by COPD education team. Patient does not qualify for COPD program.

## 2017-12-29 NOTE — PROGRESS NOTES
[facility] Hospitalist Progress Note    Date of Service: 2017    Chief Complaint  62 y.o. male admitted 2017 with right foot osteomyelitis     Interval Problem Update  Pt seen and examined, afebrile no nausea or vomiting, right fot pain  LPS and ID following appreciate rec     Consultants/Specialty  ID  LPS    Disposition  TBD      Review of Systems   Constitutional: Negative for chills and fever.   HENT: Negative for hearing loss.    Eyes: Negative for blurred vision.   Cardiovascular: Negative for chest pain and palpitations.   Gastrointestinal: Negative for nausea and vomiting.   Musculoskeletal: Negative for myalgias.        Foot pain     Skin: Negative for rash.   Neurological: Negative for dizziness.      Physical Exam  Laboratory/Imaging   Hemodynamics  Temp (24hrs), Av.6 °C (97.8 °F), Min:36.4 °C (97.5 °F), Max:36.9 °C (98.4 °F)   Temperature: 36.4 °C (97.6 °F)  Pulse  Av.9  Min: 55  Max: 73    Blood Pressure: 138/68      Respiratory      Respiration: 18, Pulse Oximetry: 97 %, O2 Daily Delivery Respiratory : Room Air with O2 Available     Work Of Breathing / Effort: Mild  RUL Breath Sounds: Clear, RML Breath Sounds: Diminished, RLL Breath Sounds: Diminished, NATASHA Breath Sounds: Clear, LLL Breath Sounds: Diminished    Fluids    Intake/Output Summary (Last 24 hours) at 17 1600  Last data filed at 17 0800   Gross per 24 hour   Intake              600 ml   Output                0 ml   Net              600 ml       Nutrition  Orders Placed This Encounter   Procedures   • DIET NPO     Standing Status:   Standing     Number of Occurrences:   1     Order Specific Question:   Restrict to:     Answer:   Sips with Medications [3]     Physical Exam   HENT:   Head: Normocephalic and atraumatic.   Eyes: Conjunctivae are normal. No scleral icterus.   Neck: Neck supple. No JVD present.   Cardiovascular: Normal rate.    No murmur heard.  Pulmonary/Chest: He has no wheezes. He has no rales.    Abdominal: Soft. Bowel sounds are normal. He exhibits no distension. There is no tenderness.   Musculoskeletal:   ulcer with no purulent discharge at the base of the right 5th right toe   Nursing note and vitals reviewed.      Recent Labs      12/28/17   1354  12/29/17   0211   WBC  9.8  9.9   RBC  5.31  4.60*   HEMOGLOBIN  15.0  12.9*   HEMATOCRIT  45.0  39.1*   MCV  84.7  85.0   MCH  28.2  28.0   MCHC  33.3*  33.0*   RDW  44.1  44.1   PLATELETCT  232  197   MPV  9.3  9.6     Recent Labs      12/28/17   1354  12/29/17   0211   SODIUM  137  134*   POTASSIUM  3.9  4.1   CHLORIDE  104  106   CO2  25  21   GLUCOSE  163*  246*   BUN  11  10   CREATININE  0.83  0.81   CALCIUM  9.3  8.4*     Recent Labs      12/28/17   1354   INR  1.49*                  Assessment/Plan     * Osteomyelitis of right foot (CMS-HCC)- (present on admission)   Assessment & Plan    Xray foot showing osteomyelitis  Limb preservation and ID following appreciate rec.   cont IV abx as per ID rec.  Arterial study pending           Chronic anticoagulation- (present on admission)   Assessment & Plan    -Check INR, hold outpatient Coumadin given possible surgical intervention and continue outpatient weight-based Lovenox        CHF (congestive heart failure) (CMS-HCC)- (present on admission)   Assessment & Plan    -Pt has chronic CHF   Currently well compensated continue outpatient medications no exacerbation         DM (diabetes mellitus) (CMS-HCC)- (present on admission)   Assessment & Plan    -Check a glycohemoglobin  -Continue  Lantus along with SSI          COPD (chronic obstructive pulmonary disease) (CMS-HCC)- (present on admission)   Assessment & Plan    -At his baseline with no current exacerbation, do respiratory therapy protocol and continue outpatient bronchodilators as needed        Coronary artery disease- (present on admission)   Assessment & Plan    -No current chest pain continue outpatient cardiac medications        Essential  hypertension- (present on admission)   Assessment & Plan    -Well-controlled   -continue outpatient blood pressure medication        PAOD (peripheral arterial occlusive disease) (CMS-HCC)- (present on admission)   Assessment & Plan    -Continue outpatient medications and monitor closely, arterial studies outlined above            Reviewed items::  Labs reviewed, Radiology images reviewed and Medications reviewed  Dorsey catheter::  No Dorsey  DVT prophylaxis pharmacological::  Enoxaparin (Lovenox)  Antibiotics:  Treating active infection/contamination beyond 24 hours perioperative coverage

## 2017-12-29 NOTE — HEART FAILURE PROGRAM
"Cardiovascular Nurse Navigator () Progress Note:     Per Dr. Brannon on 12/28 this patient is not currently in acute heart failure. Therefore, a seven day HF f/u appt is not currently indicated.    Should clinical status change to acute HF, patient will require a seven calendar day f/u appt which can be achieved by placing a \"schedule heart failure follow up appointment\" order per protocol or by calling the hospital schedulers at [phone].     Thank you and please call with questions or concerns.  "

## 2017-12-29 NOTE — PROGRESS NOTES
[employer]G off loading shoe delivered and sized to pt..If you have any questions or if equipment adjustments are needed please call the traction department at ext [phone].

## 2017-12-29 NOTE — CONSULTS
Infectious disease Consult    Requesting physician: Teddy Shafer M.D.    Date of Service: 12/29/17     Chief complaint:    Wound on right foot x 4 months    HPI: Barrett Frederick 62 y.o. male (as per LPS note he prefer to address him as female) with PMHx of DM, CAD, PVD presented to the hospital with complaint of right foot wound that has been going on for the last 4 months. She reported that she was trying to scrape off callus from right foot and in result of that this wound developed. He has been getting antibiotic but it didn't help in her wound. Denies fever, chills, nausea, vomiting and other complaint. Patient lives at [facility].    Past Medical History:   Diagnosis Date   • CAD (coronary artery disease)    • Chronic obstructive pulmonary disease    • Congestive heart failure    • Diabetes        Past Surgical History:   Procedure Laterality Date   • THROMBECTOMY Left 2/24/2016    Procedure: THROMBECTOMY;  Surgeon: Amber Botello M.D.;  Location: SURGERY [facility];  Service:      Social History     Social History   • Marital status:      Spouse name: N/A   • Number of children: N/A   • Years of education: N/A     Occupational History   • Not on file.     Social History Main Topics   • Smoking status: Current Every Day Smoker     Packs/day: 2.00     Types: Cigarettes   • Smokeless tobacco: Not on file   • Alcohol use Yes   • Drug use: No   • Sexual activity: Not on file     Other Topics Concern   • Not on file     Social History Narrative   • No narrative on file           Review of Systems   Constitutional: Negative for chills and fever.   HENT: Negative for ear pain, hearing loss and tinnitus.    Eyes: Negative for blurred vision.   Respiratory: Negative for cough and shortness of breath.    Cardiovascular: Negative for chest pain, palpitations and orthopnea.   Gastrointestinal: Negative for abdominal pain, heartburn, nausea and vomiting.   Genitourinary: Negative for dysuria.   Musculoskeletal:  Positive for joint pain and myalgias.   Neurological: Negative for headaches.         Quality Measures    Reviewed items::  Labs reviewed, Medications reviewed and Radiology images reviewed          Physical Exam       Vitals:    12/28/17 1609 12/28/17 2000 12/29/17 0400 12/29/17 0800   BP:  103/50 117/49 122/72   Pulse: 60 63 (!) 56 (!) 57   Resp: 20 17 16 18   Temp:  36.9 °C (98.4 °F) 36.5 °C (97.7 °F) 36.4 °C (97.5 °F)   SpO2: 94% 93% 91% 95%   Weight:       Height:         Body mass index is 31.97 kg/m².    Oxygen Therapy:  Pulse Oximetry: 95 %, O2 (LPM): 0, O2 Delivery: None (Room Air)    Physical Exam   Constitutional: He is oriented to person, place, and time. No distress.   HENT:   Head: Normocephalic and atraumatic.   Neck: Normal range of motion.   Cardiovascular: Normal rate and regular rhythm.  Exam reveals no friction rub.    No murmur heard.  Pulmonary/Chest: Effort normal and breath sounds normal. No respiratory distress. He has no wheezes.   Abdominal: Soft. Bowel sounds are normal. There is no tenderness. There is no rebound.   Musculoskeletal:   Right foot has small wound on planter surface below big toe.   Neurological: He is alert and oriented to person, place, and time.   Skin: Skin is warm and dry. He is not diaphoretic.         Lab Data Review:         12/29/2017  12:21 PM    Recent Labs      12/28/17   1354  12/29/17 0211   SODIUM  137  134*   POTASSIUM  3.9  4.1   CHLORIDE  104  106   CO2  25  21   BUN  11  10   CREATININE  0.83  0.81   CALCIUM  9.3  8.4*       Recent Labs      12/28/17   1354  12/29/17 0211   PREALBUMIN  25.0   --    GLUCOSE  163*  246*       Recent Labs      12/28/17   1354  12/29/17 0211   RBC  5.31  4.60*   HEMOGLOBIN  15.0  12.9*   HEMATOCRIT  45.0  39.1*   PLATELETCT  232  197   PROTHROMBTM  17.7*   --    INR  1.49*   --        Recent Labs      12/28/17   1354  12/29/17 0211   WBC  9.8  9.9   NEUTSPOLYS  60.40  64.10   LYMPHOCYTES  28.20  23.30   MONOCYTES   6.70  7.30   EOSINOPHILS  3.30  4.00   BASOPHILS  0.70  0.70       Assessment:    Patient Active Problem List   Diagnosis   • PAOD (peripheral arterial occlusive disease) (CMS-HCC)   • Essential hypertension   • Coronary artery disease   • COPD (chronic obstructive pulmonary disease) (CMS-HCC)   • DM (diabetes mellitus) (CMS-HCC)   • CHF (congestive heart failure) (CMS-HCC)   • Osteomyelitis of right foot (CMS-HCC)   • Chronic anticoagulation     Plan:    Osteomyelitis of right foot:    Patient has history of diabetes and he has been having this wound for last 4 months. Xray showed soft tissue swelling and gas consistent with infection. Also showed distal fourth metatarsal base of the proximal phalanx consistent with osteomyelitis.   CRP is 0.52 and ESR is 31.  Patient needs surgical evaluation due to osteomyelitis for possible surgery. Currently he is NPO.  LPS consulted and they evaluated patient.  Arterial study results pending.  May consider MRI to evaluate further extent of disease.  Ordered blood and wound culture.  Continue vancomycin and zosyn at this time.    Diabetes Mellitus:    Patient has history of diabetes and his current HBA1c is 9.7.  He needs better glycemic control.    Peripheral vascular disease:    Arterial studies provide us better information about current status.  Results pending.    Thank you for allowing us to participate in patient care. We will continue to follow this patient with you.

## 2017-12-30 LAB
ANION GAP SERPL CALC-SCNC: 5 MMOL/L (ref 0–11.9)
BUN SERPL-MCNC: 10 MG/DL (ref 8–22)
CALCIUM SERPL-MCNC: 8.3 MG/DL (ref 8.5–10.5)
CHLORIDE SERPL-SCNC: 109 MMOL/L (ref 96–112)
CO2 SERPL-SCNC: 22 MMOL/L (ref 20–33)
CREAT SERPL-MCNC: 0.85 MG/DL (ref 0.5–1.4)
ERYTHROCYTE [DISTWIDTH] IN BLOOD BY AUTOMATED COUNT: 44 FL (ref 35.9–50)
GFR SERPL CREATININE-BSD FRML MDRD: >60 ML/MIN/1.73 M 2
GLUCOSE BLD-MCNC: 169 MG/DL (ref 65–99)
GLUCOSE BLD-MCNC: 192 MG/DL (ref 65–99)
GLUCOSE BLD-MCNC: 231 MG/DL (ref 65–99)
GLUCOSE BLD-MCNC: 245 MG/DL (ref 65–99)
GLUCOSE SERPL-MCNC: 178 MG/DL (ref 65–99)
GRAM STN SPEC: NORMAL
GRAM STN SPEC: NORMAL
HCT VFR BLD AUTO: 37 % (ref 42–52)
HGB BLD-MCNC: 12.4 G/DL (ref 14–18)
MCH RBC QN AUTO: 28.4 PG (ref 27–33)
MCHC RBC AUTO-ENTMCNC: 33.5 G/DL (ref 33.7–35.3)
MCV RBC AUTO: 84.7 FL (ref 81.4–97.8)
PLATELET # BLD AUTO: 190 K/UL (ref 164–446)
PMV BLD AUTO: 9.6 FL (ref 9–12.9)
POTASSIUM SERPL-SCNC: 3.8 MMOL/L (ref 3.6–5.5)
RBC # BLD AUTO: 4.37 M/UL (ref 4.7–6.1)
SIGNIFICANT IND 70042: NORMAL
SIGNIFICANT IND 70042: NORMAL
SITE SITE: NORMAL
SITE SITE: NORMAL
SODIUM SERPL-SCNC: 136 MMOL/L (ref 135–145)
SOURCE SOURCE: NORMAL
SOURCE SOURCE: NORMAL
WBC # BLD AUTO: 11.7 K/UL (ref 4.8–10.8)

## 2017-12-30 PROCEDURE — 99232 SBSQ HOSP IP/OBS MODERATE 35: CPT | Performed by: INTERNAL MEDICINE

## 2017-12-30 PROCEDURE — 700105 HCHG RX REV CODE 258

## 2017-12-30 PROCEDURE — 770006 HCHG ROOM/CARE - MED/SURG/GYN SEMI*

## 2017-12-30 PROCEDURE — 700111 HCHG RX REV CODE 636 W/ 250 OVERRIDE (IP): Performed by: INTERNAL MEDICINE

## 2017-12-30 PROCEDURE — 97162 PT EVAL MOD COMPLEX 30 MIN: CPT

## 2017-12-30 PROCEDURE — 82962 GLUCOSE BLOOD TEST: CPT | Mod: 91

## 2017-12-30 PROCEDURE — 700111 HCHG RX REV CODE 636 W/ 250 OVERRIDE (IP): Performed by: STUDENT IN AN ORGANIZED HEALTH CARE EDUCATION/TRAINING PROGRAM

## 2017-12-30 PROCEDURE — 700105 HCHG RX REV CODE 258: Performed by: INTERNAL MEDICINE

## 2017-12-30 PROCEDURE — 85027 COMPLETE CBC AUTOMATED: CPT

## 2017-12-30 PROCEDURE — G8979 MOBILITY GOAL STATUS: HCPCS | Mod: CI

## 2017-12-30 PROCEDURE — G8978 MOBILITY CURRENT STATUS: HCPCS | Mod: CK

## 2017-12-30 PROCEDURE — A9270 NON-COVERED ITEM OR SERVICE: HCPCS | Performed by: INTERNAL MEDICINE

## 2017-12-30 PROCEDURE — 700102 HCHG RX REV CODE 250 W/ 637 OVERRIDE(OP): Performed by: INTERNAL MEDICINE

## 2017-12-30 PROCEDURE — 36415 COLL VENOUS BLD VENIPUNCTURE: CPT

## 2017-12-30 PROCEDURE — 80048 BASIC METABOLIC PNL TOTAL CA: CPT

## 2017-12-30 PROCEDURE — 700111 HCHG RX REV CODE 636 W/ 250 OVERRIDE (IP)

## 2017-12-30 RX ADMIN — GABAPENTIN 100 MG: 100 CAPSULE ORAL at 14:08

## 2017-12-30 RX ADMIN — FUROSEMIDE 10 MG: 20 TABLET ORAL at 09:29

## 2017-12-30 RX ADMIN — PENTOXIFYLLINE 400 MG: 400 TABLET, FILM COATED, EXTENDED RELEASE ORAL at 17:02

## 2017-12-30 RX ADMIN — ATENOLOL 50 MG: 50 TABLET ORAL at 14:08

## 2017-12-30 RX ADMIN — STANDARDIZED SENNA CONCENTRATE AND DOCUSATE SODIUM 2 TABLET: 8.6; 5 TABLET, FILM COATED ORAL at 09:29

## 2017-12-30 RX ADMIN — ATENOLOL 50 MG: 50 TABLET ORAL at 09:29

## 2017-12-30 RX ADMIN — INSULIN HUMAN 2 UNITS: 100 INJECTION, SOLUTION PARENTERAL at 11:41

## 2017-12-30 RX ADMIN — GABAPENTIN 100 MG: 100 CAPSULE ORAL at 21:03

## 2017-12-30 RX ADMIN — MORPHINE SULFATE 3 MG: 4 INJECTION INTRAVENOUS at 01:06

## 2017-12-30 RX ADMIN — INSULIN HUMAN 2 UNITS: 100 INJECTION, SOLUTION PARENTERAL at 16:58

## 2017-12-30 RX ADMIN — PENTOXIFYLLINE 400 MG: 400 TABLET, FILM COATED, EXTENDED RELEASE ORAL at 11:45

## 2017-12-30 RX ADMIN — MORPHINE SULFATE 3 MG: 4 INJECTION INTRAVENOUS at 21:05

## 2017-12-30 RX ADMIN — CLOPIDOGREL 75 MG: 75 TABLET, FILM COATED ORAL at 21:03

## 2017-12-30 RX ADMIN — INSULIN HUMAN 1 UNITS: 100 INJECTION, SOLUTION PARENTERAL at 05:42

## 2017-12-30 RX ADMIN — MORPHINE SULFATE 3 MG: 4 INJECTION INTRAVENOUS at 04:00

## 2017-12-30 RX ADMIN — OMEPRAZOLE 20 MG: 20 CAPSULE, DELAYED RELEASE ORAL at 09:28

## 2017-12-30 RX ADMIN — VANCOMYCIN HYDROCHLORIDE 2500 MG: 100 INJECTION, POWDER, LYOPHILIZED, FOR SOLUTION INTRAVENOUS at 12:29

## 2017-12-30 RX ADMIN — MORPHINE SULFATE 3 MG: 4 INJECTION INTRAVENOUS at 14:08

## 2017-12-30 RX ADMIN — MORPHINE SULFATE 3 MG: 4 INJECTION INTRAVENOUS at 17:33

## 2017-12-30 RX ADMIN — ISOSORBIDE DINITRATE 20 MG: 10 TABLET ORAL at 09:29

## 2017-12-30 RX ADMIN — ATENOLOL 50 MG: 50 TABLET ORAL at 21:03

## 2017-12-30 RX ADMIN — MORPHINE SULFATE 3 MG: 4 INJECTION INTRAVENOUS at 07:31

## 2017-12-30 RX ADMIN — INSULIN HUMAN 1 UNITS: 100 INJECTION, SOLUTION PARENTERAL at 20:58

## 2017-12-30 RX ADMIN — NIFEDIPINE 30 MG: 30 TABLET, FILM COATED, EXTENDED RELEASE ORAL at 09:28

## 2017-12-30 RX ADMIN — ATORVASTATIN CALCIUM 20 MG: 20 TABLET, FILM COATED ORAL at 21:03

## 2017-12-30 RX ADMIN — SODIUM CHLORIDE: 9 INJECTION, SOLUTION INTRAVENOUS at 11:46

## 2017-12-30 RX ADMIN — GABAPENTIN 100 MG: 100 CAPSULE ORAL at 09:29

## 2017-12-30 RX ADMIN — PENTOXIFYLLINE 400 MG: 400 TABLET, FILM COATED, EXTENDED RELEASE ORAL at 09:28

## 2017-12-30 RX ADMIN — MORPHINE SULFATE 3 MG: 4 INJECTION INTRAVENOUS at 10:36

## 2017-12-30 RX ADMIN — INSULIN GLARGINE 24 UNITS: 100 INJECTION, SOLUTION SUBCUTANEOUS at 20:57

## 2017-12-30 RX ADMIN — ENOXAPARIN SODIUM 100 MG: 100 INJECTION SUBCUTANEOUS at 21:02

## 2017-12-30 RX ADMIN — ENOXAPARIN SODIUM 100 MG: 100 INJECTION SUBCUTANEOUS at 09:29

## 2017-12-30 RX ADMIN — ASPIRIN 81 MG: 81 TABLET, COATED ORAL at 09:28

## 2017-12-30 ASSESSMENT — COGNITIVE AND FUNCTIONAL STATUS - GENERAL
STANDING UP FROM CHAIR USING ARMS: A LITTLE
CLIMB 3 TO 5 STEPS WITH RAILING: TOTAL
MOBILITY SCORE: 19
WALKING IN HOSPITAL ROOM: A LITTLE
SUGGESTED CMS G CODE MODIFIER MOBILITY: CK

## 2017-12-30 ASSESSMENT — ENCOUNTER SYMPTOMS
DIZZINESS: 0
SHORTNESS OF BREATH: 0
ABDOMINAL PAIN: 0
VOMITING: 0
MYALGIAS: 1
FEVER: 0
MYALGIAS: 0
NAUSEA: 0
BLURRED VISION: 0
COUGH: 0
FOCAL WEAKNESS: 0
CHILLS: 0
PALPITATIONS: 0

## 2017-12-30 ASSESSMENT — PAIN SCALES - GENERAL
PAINLEVEL_OUTOF10: ASSUMED PAIN PRESENT
PAINLEVEL_OUTOF10: 10
PAINLEVEL_OUTOF10: 10

## 2017-12-30 ASSESSMENT — GAIT ASSESSMENTS
ASSISTIVE DEVICE: FRONT WHEEL WALKER
GAIT LEVEL OF ASSIST: MINIMAL ASSIST
DEVIATION: STEP TO;DECREASED BASE OF SUPPORT
DISTANCE (FEET): 10

## 2017-12-30 NOTE — THERAPY
"Physical Therapy Evaluation completed.   Bed Mobility:  Supine to Sit: Stand by Assist  Transfers: Sit to Stand: Contact Guard Assist  Gait: Level Of Assist: Minimal Assist with Front-Wheel Walker       Plan of Care: Will benefit from Physical Therapy 4 times per week  Discharge Recommendations: Equipment: Will Continue to Assess for Equipment Needs.     Pt presents with decreased functional mobility most likely due to pain and instability during gait. Pt is able to manage own LE on/off of bed with Cam[employer] on. In standing, pt had single LOB requiring assist for correction. Gait distance was limited by fatigue, pain, and feeling of nausea/lightheadedness. Pt will benefit from further acute skilled PT services to improve functional mobility. Unclear if requires placement at this time.     See \"Rehab Therapy-Acute\" Patient Summary Report for complete documentation.     "

## 2017-12-30 NOTE — PROGRESS NOTES
Infectious Disease Progress Note    Author: Maria M Delgado M.D. Date & Time of service: 2017  9:23 AM    Chief Complaint:  Diabetic foot infection    Interval History:  -MAXIMUM TEMPERATURE 98.4 WBC 11.7 creatinine 0.85. No new issues overnight  Labs Reviewed, Medications Reviewed, Radiology Reviewed and Wound Reviewed.    Review of Systems:  Review of Systems   Constitutional: Negative for fever.   HENT: Negative for hearing loss.    Respiratory: Negative for cough and shortness of breath.    Cardiovascular: Negative for chest pain.   Gastrointestinal: Negative for abdominal pain, nausea and vomiting.   Genitourinary: Negative for dysuria.   Musculoskeletal: Positive for myalgias.   Neurological: Negative for focal weakness.       Hemodynamics:  Temp (24hrs), Av.5 °C (97.7 °F), Min:36.1 °C (96.9 °F), Max:36.9 °C (98.4 °F)  Temperature: 36.9 °C (98.4 °F)  Pulse  Av.9  Min: 53  Max: 73Heart Rate (Monitored): 66  Blood Pressure: 130/66, NIBP: 133/52       Physical Exam:  Physical Exam   Constitutional: He is oriented to person, place, and time. No distress.   HENT:   Head: Normocephalic.   Eyes: No scleral icterus.   Neck: Neck supple.   Cardiovascular: Regular rhythm.    No murmur heard.  Pulmonary/Chest: He has no wheezes. He has no rales.   Abdominal: Soft. There is no tenderness. There is no rebound.   Musculoskeletal:   Right foot bandaged and in a boot.   Neurological: He is alert and oriented to person, place, and time.   Vitals reviewed.      Meds:    Current Facility-Administered Medications:   •  MD ALERT... vancomycin  •  [COMPLETED] piperacillin-tazobactam **AND** piperacillin-tazobactam (ZOSYN) continuous infusion  •  naproxen  •  morphine injection  •  aspirin EC  •  atenolol  •  atorvastatin  •  clopidogrel  •  enoxaparin  •  furosemide  •  gabapentin  •  insulin glargine  •  isosorbide dinitrate  •  NIFEdipine SR  •  omeprazole  •  pentoxifylline CR  •  senna-docusate **AND**  polyethylene glycol/lytes **AND** magnesium hydroxide **AND** bisacodyl  •  Respiratory Care per Protocol  •  acetaminophen  •  insulin regular **AND** Accu-Chek ACHS **AND** NOTIFY MD and PharmD **AND** glucose 4 g **AND** dextrose 50%  •  ondansetron  •  ondansetron  •  promethazine  •  promethazine  •  prochlorperazine    Labs:  Recent Labs      12/28/17   1354  12/29/17   0211  12/30/17 0214   WBC  9.8  9.9  11.7*   RBC  5.31  4.60*  4.37*   HEMOGLOBIN  15.0  12.9*  12.4*   HEMATOCRIT  45.0  39.1*  37.0*   MCV  84.7  85.0  84.7   MCH  28.2  28.0  28.4   RDW  44.1  44.1  44.0   PLATELETCT  232  197  190   MPV  9.3  9.6  9.6   NEUTSPOLYS  60.40  64.10   --    LYMPHOCYTES  28.20  23.30   --    MONOCYTES  6.70  7.30   --    EOSINOPHILS  3.30  4.00   --    BASOPHILS  0.70  0.70   --      Recent Labs      12/28/17   1354  12/29/17   0211  12/30/17 0214   SODIUM  137  134*  136   POTASSIUM  3.9  4.1  3.8   CHLORIDE  104  106  109   CO2  25  21  22   GLUCOSE  163*  246*  178*   BUN  11  10  10     Recent Labs      12/28/17   1354  12/29/17   0211  12/30/17 0214   CREATININE  0.83  0.81  0.85       Imaging:  Dx-foot-complete 3+ Right    Result Date: 12/28/2017 12/28/2017 1:11 PM HISTORY/REASON FOR EXAM:  Atraumatic Pain/Swelling/Deformity. TECHNIQUE/EXAM DESCRIPTION:  3 views RIGHT foot. COMPARISON:  None. FINDINGS:  There is soft tissue swelling and soft tissue gas adjacent to the distal right fourth metatarsal and right fourth toe. There is destruction of the distal right fourth metatarsal and base of the proximal phalanx. There may be erosive changes involving the margins of the fifth MTP joint. No other destructive lesions no acute fracture identified.     Distal lateral soft tissue swelling and gas consistent with infection. Destruction of the distal fourth metatarsal base of the proximal phalanx consistent with osteomyelitis. Possible osteomyelitis involving the distal fifth metatarsal base of the fifth  proximal phalanx.    Le Art Duplx/imag    Result Date: 2017  Lower Extremity  Arterial Duplex Report  Vascular Laboratory  CONCLUSIONS  BAILEY FARNSWORTH  Exam Date:     2017 10:50  Room #:     Inpatient  Priority:     Routine  Ht (in):             Wt (lb):  Ordering Physician:        CORBIN MENDENHALL  Referring Physician:       CORBIN MENDENHALL  Sonographer:               Steffi Boyle RVT  Study Type:                Complete Bilateral  Technical Quality:         Adequate  Age:    62    Gender:     M  MRN:    [id number]  :    1955      BSA:  Indications:     Claudication  CPT Codes:       75056  ICD Codes:       I70.213  History:         Unhealing wound on ball of right foot x4 months. History of                   diabetes.  Limitations:                RIGHT  Waveform        Peak Systolic Velocity (cm/s)                  Prox    Prox-Mid  Mid    Mid-Dist  Distal  Triphasic                         139                      CFA  Biphasic        121                                        PFA  Triphasic       164               116              85      SFA  Triphasic                         78                       POP  Bi, non-        155                                139     AT  reversed  Monophasic      19                                 17      PT  Absent          0                                  41      SHANNON                LEFT  Waveform        Peak Systolic Velocity (cm/s)                  Prox    Prox-Mid  Mid    Mid-Dist  Distal  Triphasic                         141                      CFA  Triphasic       120                                        PFA  Triphasic       129               98               70      SFA  Triphasic                         55                       POP  Bi, non-        59                                 78      AT  reversed  Bi, non-        65                                 23      PT  reversed  Absent          75                                 0       SHANNON  FINDINGS   Right.  Diffuse plaque is seen in the common femoral, profunda femoral, femoral,  and popliteal arteries without evidence of hemodynamically significant  stenosis.  No flow can be demonstrated in the proximal peroneal artery.  Waveforms at the posterior tibial are monophasic with severly reduced  amplitude.  Waveforms at the anterior tibial artery are biphasic.  Left.  Diffuse plaque is seen in the common femoral, profunda femoral, femoral,  and popliteal arteries without evidence of hemodynamically significant  stenosis.  No flow can be demonstrated in the distal peroneal artery.  Waveforms at the posterior tibial and anterior tibial biphasic.     Le Art/katja    Result Date: 2017   Vascular Laboratory  Conclusions  BAILEY FARNSWORTH  Age:    62    Gender:     M  MRN:    [id number]  :    1955      BSA:  Exam Date:     2017 12:43  Room #:     Inpatient  Priority:     Routine  Ht (in):             Wt (lb):  Ordering Physician:        CORBIN MENDENHALL  Referring Physician:       CORBIN MENDENHALL  Sonographer:               Steffi Boyle RVT  Study Type:                Limited Bilateral  Technical Quality:         Adequate  Indications:     Claudication  CPT Codes:       02555  ICD Codes:       I70.213  History:         Unhealing wound on ball of right foot. Diabetes mellitus.  Limitations:     IV in left arm.                 RIGHT  Waveform            Systolic BPs (mmHg)                             112           Brachial  Triphasic                                Common Femoral  Monophasic                 76            Posterior Tibial  Bi, non-                   118           Dorsalis Pedis  reversed                                           Peroneal                             1.05          KATJA                                           TBI                       LEFT  Waveform        Systolic BPs (mmHg)                                           Brachial  Triphasic                                Common Femoral  " Bi, non-                   0             Posterior Tibial  reversed  Bi, non-                   100           Dorsalis Pedis  reversed                                           Peroneal                             0.89          TATY                                           TBI  Findings  Right.  Ankle brachial index is 1.05.  Doppler waveform of the common femoral and popliteal arteries are of high  amplitude and triphasic.  Doppler waveforms of the posterior tibial at the ankle are monophasic.  Doppler waveforms at the ankle are brisk and biphasic.  Left.  Ankle brachial index is 0.89 for the dorsalis pedis artery.  Ankle brachial index for the posterior tibial artery is non-compressible.  Doppler waveform of the common femoral artery is of high amplitude and  triphasic.  Doppler waveforms at the ankle are brisk and biphasic.  Arterial duplex scan was performed in accordance with lower extremity  arterial evaluation protocol - see separate report.       Micro:  Results     Procedure Component Value Units Date/Time    BLOOD CULTURE [[id number]] Collected:  12/29/17 1304    Order Status:  Completed Specimen:  Blood from Peripheral Updated:  12/30/17 0844     Significant Indicator NEG     Source BLD     Site PERIPHERAL     Blood Culture --     No Growth    Note: Blood cultures are incubated for 5 days and  are monitored continuously.Positive blood cultures  are called to the RN and reported as soon as  they are identified.      Narrative:       Per Hospital Policy: Only change Specimen Src: to \"Line\" if  specified by physician order.    BLOOD CULTURE [[id number]] Collected:  12/29/17 1305    Order Status:  Completed Specimen:  Blood from Peripheral Updated:  12/30/17 0844     Significant Indicator NEG     Source BLD     Site PERIPHERAL     Blood Culture --     No Growth    Note: Blood cultures are incubated for 5 days and  are monitored continuously.Positive blood cultures  are called to the RN and reported as soon " "as  they are identified.      Narrative:       Per Hospital Policy: Only change Specimen Src: to \"Line\" if  specified by physician order.    GRAM STAIN [[id number]] Collected:  12/29/17 1736    Order Status:  Completed Specimen:  Tissue Updated:  12/30/17 0748     Significant Indicator .     Source TISS     Site 5th Metatarsal Head     Gram Stain Result --     Rare WBCs.  No organisms seen.      GRAM STAIN [[id number]] Collected:  12/29/17 1730    Order Status:  Completed Specimen:  Tissue Updated:  12/30/17 0748     Significant Indicator .     Source TISS     Site 4th Metatarsal Head     Gram Stain Result --     Few WBCs.  No organisms seen.      CULTURE TISSUE W/ GRM STAIN [[id number]] Collected:  12/29/17 1736    Order Status:  Completed Specimen:  Other Updated:  12/29/17 1836    ANAEROBIC CULTURE [[id number]] Collected:  12/29/17 1736    Order Status:  Completed Specimen:  Other Updated:  12/29/17 1836    CULTURE TISSUE W/ GRM STAIN [[id number]] Collected:  12/29/17 1730    Order Status:  Completed Specimen:  Other Updated:  12/29/17 1835    ANAEROBIC CULTURE [[id number]] Collected:  12/29/17 1730    Order Status:  Completed Specimen:  Other Updated:  12/29/17 1835    CULTURE WOUND W/ GRAM STAIN [[id number]]     Order Status:  No result Specimen:  Wound from Right Foot           Assessment:  Active Hospital Problems    Diagnosis   • *Osteomyelitis of right foot (CMS-HCC) [M86.9]   • Chronic anticoagulation [Z79.01]   • CHF (congestive heart failure) (CMS-HCC) [I50.9]   • COPD (chronic obstructive pulmonary disease) (CMS-HCC) [J44.9]   • Coronary artery disease [I25.10]   • DM (diabetes mellitus) (CMS-HCC) [E11.9]   • Essential hypertension [I10]   • PAOD (peripheral arterial occlusive disease) (CMS-HCC) [I77.9]       Plan:  Diabetic foot ulcer  Underlying osteomyelitis  Status post I&D and right fourth and fifth metatarsal head and proximal phalanx excision on 12/29/17  Cultures are negative so far  Continue to " follow  Wound Care  Currently on vancomycin and Zosyn    Diabetes mellitus  Keep the blood sugars under control for wound healing    Discussed with internal medicine.

## 2017-12-30 NOTE — PROGRESS NOTES
"Pharmacy Kinetics 62 y.o. male on vancomycin day # 2 2017    Currently on Vancomycin 2500 mg loading dose given @  1108 2017.   Reload 2500 mg scheduled for 1200 today followed by 1600 mg mg iv q12hr (, 12)    Indication for Treatment: right foot diabetic foot ulcer possible oseomyelitis    Pertinent history per medical record: Admitted on 2017 for right foot diabetic foot ulcer.  Poorly controlled DM, hx of multiple blood clots, on warfarin at home.  Hx of PVD, on pentoxifylline.  Has been taking Augmentin at home for the infected foot.  Now S/P 4th and 5th metatarsal head excision, gastroscoleous recession.     Other antibiotics: Piperacillin/Tazobactam 10.125 g continuous IV    Allergies: Patient has no known allergies.     List concerns for renal function : DM poorly controlled with know PVD.  Obese.     Pertinent cultures to date:   Results for BAILEY FARNSWORTH (MRN [id number]) as of 2017 11:35   2017 13:04 2017 13:05 2017 17:30 2017 17:36   Gram Stain Result   Few WBCs.... Rare WBCs....   Significant Indicator NEG NEG . .   Site PERIPHERAL PERIPHERAL 4th Metatarsal Head 5th Metatarsal Head   Source BLD BLD TISS TISS       Recent Labs      17   1354  17   0211  17   021   WBC  9.8  9.9  11.7*   NEUTSPOLYS  60.40  64.10   --      Recent Labs      17   1354  17   0211  17   0214   BUN  11  10  10   CREATININE  0.83  0.81  0.85     No results for input(s): VANCOTROUGH, VANCOPEAK, VANCORANDOM in the last 72 hours.  Intake/Output Summary (Last 24 hours) at 17 1135  Last data filed at 17 0001   Gross per 24 hour   Intake             1320 ml   Output              360 ml   Net              960 ml      Blood pressure 130/66, pulse 61, temperature 36.9 °C (98.4 °F), resp. rate 18, height 1.753 m (5' 9\"), weight 98.2 kg (216 lb 7.9 oz), SpO2 92 %. Temp (24hrs), Av.5 °C (97.7 °F), Min:36.1 °C (96.9 °F), Max:36.9 °C (98.4 " °F)      A/P   1. Vancomycin dose change: start per protocol  2. Next vancomycin level: tomorrow @ 1130 am (before noon dose)  3. Goal trough: 12-16 mcg/mL  4. Comments: I/O data incomplete.  Renal labs ok, but with poorly controlled DM and PVD, renal of concern.  Follow up on cultures from foot.  Follow up on trough tomorrow.    RICHARD Thompson, PharmD, BCPS

## 2017-12-30 NOTE — CARE PLAN
Problem: Safety  Goal: Will remain free from injury    Intervention: Provide assistance with mobility  Pt has been using call light for assistance. Pt CGA with right leg.       Problem: Pain Management  Goal: Pain level will decrease to patient's comfort goal    Intervention: Follow pain managment plan developed in collaboration with patient and Interdisciplinary Team  Pt medicated as ordered. Pt educated on risks and side effects of medication.

## 2017-12-30 NOTE — OP REPORT
DATE OF SERVICE:  12/29/2017    PREOPERATIVE DIAGNOSES:  1.  Right fourth and fifth metatarsal head osteomyelitis.  2.  Right fourth plantar metatarsal head ulceration.    POSTOPERATIVE DIAGNOSES:  1.  Right fourth and fifth metatarsal head osteomyelitis.  2.  Right fourth plantar metatarsal head ulceration.    PROCEDURES PERFORMED:  1.  Irrigation and debridement, right foot and skin, subcutaneous tissue to   fascia plantar ulceration.  2.  Right fourth metatarsal head excision.  3.  Right fifth metatarsal head excision.  4.  Right fourth proximal phalanx partial excision.  5.  Right fifth proximal phalanx partial excision.  6.  Right gastrocsoleus recession.    SURGEON:  Masoud Villegas MD.    ASSISTANT:  Irma Alcantar.    ANESTHESIA:  General.    ESTIMATED BLOOD LOSS:  10 mL.    TOURNIQUET TIME:  22 minutes at 250 mmHg.    FINDINGS:  Necrotic fourth metatarsal head, necrotic base of the fourth and   fifth proximal phalanx, moderate degenerative change within the fifth   metatarsal head indicative of earlier osteomyelitis.  No gross purulence.    COMPLICATIONS:  None.    OUTCOME:  To PACU in stable condition.    HISTORY OF PRESENT ILLNESS:  The patient is a 62-year-old individual with a   3-4 month history of nonhealing plantar ulceration.  He has diabetes and   diabetic neuropathy.  He was admitted yesterday for radiographic evidence of   osteomyelitis and his nonhealing ulceration.  He was indicated for the above   stated procedure.  He was agreed in the preoperative holding area and   identified by name and medical record number.  The right lower extremity was   marked.  Risks of the procedure including bleeding, infection, pain, need for   more surgery, neurovascular damage, and loss of limb were discussed and he   provided a written consent.    DESCRIPTION OF PROCEDURE:  He was taken to the operating room, placed on table   in supine position.  Preoperative antibiotics were administered and general   anesthesia  was induced.  A nonsterile tourniquet was placed on his right thigh   and his right lower extremity was prepped and draped in the usual sterile   fashion.  An operative pause was undertaken, where all present were in   agreement with patient identification, laterality, and procedure to be   performed.  The limb was elevated for 5 minutes and the tourniquet was raised   to 250 mmHg.    Right gastrocsoleus recession:  A 3 cm longitudinal incision was made on the   medial leg at the myotendinous junction of the gastrocsoleus complex.  Blunt   dissection was carried down to the crural fascia, which was incised in line   with the incision.  The gastroc fascia was visualized in its entirety and   transected with a 15 blade, taking care to protect the sural and saphenous   nerves.  We obtained 20 degrees of additional dorsiflexion with his knee in   extension.  This incision was copiously irrigated.  Subcutaneous layer was   closed with 3-0 Vicryl in buried interrupted fashion and skin closed with 3-0   nylon in horizontal mattress fashion.    Right fourth and fifth metatarsal head excision with excision of proximal   aspect of fourth and fifth proximal phalanx.  A 2 cm fourth webspace incision   was made.  Blunt dissection was carried down to the fourth metatarsal head.    This was found to be necrotic and completely  from the metatarsal   neck.  An oscillating saw was used to perform a cut at the neck and remove the   head to healthy bone.  The proximal aspect of the proximal phalanx was then   evaluated and found to be necrotic.  All necrotic areas were removed using a   rongeur.  The identical procedure was performed with a fifth metatarsal head.    The metatarsal head was found to be necrotic medially with earlier signs of   osteomyelitis.  An oscillating saw was used to remove this with a plantar and   lateral bevel.  Similarly, the proximal aspect of the fifth proximal phalanx   appeared necrotic and was  removed with a rongeur.  The 5th metatarsal bone was   sent separately for culture.    Irrigation and debridement of skin, subcutaneous tissue, bone:  The plantar   ulceration was evaluated.  A 15 blade and forceps were used to excise callus   and necrotic tissue within the ulceration.  We then copiously irrigated both   the dorsal incision and the plantar ulceration with 3 L of sterile normal   saline using a curette to remove any additional nonviable tissue.  The dorsal   incision was closed with 2-0 nylon in horizontal mattress fashion.  The   plantar ulceration was packed with iodoform gauze.  Xeroform gauze and a   compressive dressing were placed.  Tourniquet was let down for a total time of   22 minutes.  The patient was awakened and extubated in stable condition.    POSTOPERATIVE COURSE:  He will remain under the care of the hospitalist   service, followed by orthopedics, infectious disease, and limb preservation   service.  We greatly appreciate their assistance in management.  He will be   weightbearing as tolerated, right lower extremity in a Cam walker boot.  I   would like him to wear the boot at all times.  Initial dressing change on   postoperative day 2 or 3.       ____________________________________     MD NESHA QUIÑONES / JENNIFER    DD:  12/29/2017 18:15:10  DT:  12/29/2017 19:02:18    D#:  [id number]  Job#:  [id number]

## 2017-12-30 NOTE — PROGRESS NOTES
[facility] Hospitalist Progress Note    Date of Service: 2017    Chief Complaint  62 y.o. male admitted 2017 with right foot osteomyelitis     Interval Problem Update  Pt seen and examined, surgical debridement done yesterday for his right foot osteomyleitis  Ortho, ID and LPS following  following appreciate rec     Consultants/Specialty  ID  LPS  Ortho     Disposition  TBD      Review of Systems   Constitutional: Negative for chills and fever.   HENT: Negative for hearing loss.    Eyes: Negative for blurred vision.   Cardiovascular: Negative for chest pain and palpitations.   Gastrointestinal: Negative for nausea and vomiting.   Musculoskeletal: Negative for myalgias.        Foot pain     Skin: Negative for rash.   Neurological: Negative for dizziness.      Physical Exam  Laboratory/Imaging   Hemodynamics  Temp (24hrs), Av.5 °C (97.7 °F), Min:36.1 °C (96.9 °F), Max:36.9 °C (98.4 °F)   Temperature: 36.9 °C (98.4 °F)  Pulse  Av.9  Min: 53  Max: 73 Heart Rate (Monitored): 66  Blood Pressure: 130/66, NIBP: 133/52      Respiratory      Respiration: 18, Pulse Oximetry: 92 %        RUL Breath Sounds: Clear, RML Breath Sounds: Diminished, RLL Breath Sounds: Diminished, NATASHA Breath Sounds: Clear, LLL Breath Sounds: Diminished    Fluids    Intake/Output Summary (Last 24 hours) at 17 1147  Last data filed at 17 0001   Gross per 24 hour   Intake             1320 ml   Output              360 ml   Net              960 ml       Nutrition  Orders Placed This Encounter   Procedures   • DIET ORDER     Standing Status:   Standing     Number of Occurrences:   1     Order Specific Question:   Diet:     Answer:   Diabetic [3]     Order Specific Question:   Diet:     Answer:   Cardiac [6]     Physical Exam   HENT:   Head: Normocephalic and atraumatic.   Eyes: Conjunctivae are normal. No scleral icterus.   Neck: Neck supple. No JVD present.   Cardiovascular: Normal rate.    No murmur heard.  Pulmonary/Chest: He  has no wheezes. He has no rales.   Abdominal: Soft. Bowel sounds are normal. He exhibits no distension. There is no tenderness.   Musculoskeletal:   ulcer with no purulent discharge at the base of the right 5th right toe   Nursing note and vitals reviewed.      Recent Labs      12/28/17   1354  12/29/17   0211  12/30/17   0214   WBC  9.8  9.9  11.7*   RBC  5.31  4.60*  4.37*   HEMOGLOBIN  15.0  12.9*  12.4*   HEMATOCRIT  45.0  39.1*  37.0*   MCV  84.7  85.0  84.7   MCH  28.2  28.0  28.4   MCHC  33.3*  33.0*  33.5*   RDW  44.1  44.1  44.0   PLATELETCT  232  197  190   MPV  9.3  9.6  9.6     Recent Labs      12/28/17   1354  12/29/17   0211  12/30/17 0214   SODIUM  137  134*  136   POTASSIUM  3.9  4.1  3.8   CHLORIDE  104  106  109   CO2  25  21  22   GLUCOSE  163*  246*  178*   BUN  11  10  10   CREATININE  0.83  0.81  0.85   CALCIUM  9.3  8.4*  8.3*     Recent Labs      12/28/17   1354   INR  1.49*                  Assessment/Plan     * Osteomyelitis of right foot (CMS-HCC)- (present on admission)   Assessment & Plan    Xray foot showing osteomyelitis  Had surgery yesterday   Ortho, ID and LPS following appreciate rec.   cont IV abx as per ID rec.            Chronic anticoagulation- (present on admission)   Assessment & Plan    -Check INR, hold outpatient Coumadin given possible surgical intervention and continue outpatient weight-based Lovenox        CHF (congestive heart failure) (CMS-HCC)- (present on admission)   Assessment & Plan    -Pt has chronic CHF   Currently well compensated continue outpatient medications no exacerbation         DM (diabetes mellitus) (CMS-HCC)- (present on admission)   Assessment & Plan    -A1C 9.7   -Continue  Lantus along with SSI          COPD (chronic obstructive pulmonary disease) (CMS-HCC)- (present on admission)   Assessment & Plan    -At his baseline with no current exacerbation, do respiratory therapy protocol and continue outpatient bronchodilators as needed        Coronary  artery disease- (present on admission)   Assessment & Plan    -No current chest pain continue outpatient cardiac medications        Essential hypertension- (present on admission)   Assessment & Plan    -Well-controlled   -continue outpatient blood pressure medication        PAOD (peripheral arterial occlusive disease) (CMS-[facility])- (present on admission)   Assessment & Plan    -Continue outpatient medications and monitor closely, arterial studies outlined above            Reviewed items::  Labs reviewed, Radiology images reviewed and Medications reviewed  Dorsey catheter::  No Dorsey  DVT prophylaxis pharmacological::  Enoxaparin (Lovenox)  Antibiotics:  Treating active infection/contamination beyond 24 hours perioperative coverage

## 2017-12-30 NOTE — PROGRESS NOTES
PT aox4. MARTINEZ with right side weakness. Pt states that the boot was heavy and needs assistance likting it up back to bed. Pt in bed. Denies pian at this time.

## 2017-12-30 NOTE — OR SURGEON
Immediate Post OP Note    PreOp Diagnosis: Right 4/5 MTH osteo with plantar ulceration    PostOp Diagnosis: Same    Procedure(s):  4th 5th metatarsal head excision,gastrocsoleous recession - Wound Class: Contaminated    Surgeon(s):  Masoud Villegas M.D.    Anesthesiologist/Type of Anesthesia:  Anesthesiologist: Fabian Flanagan M.D./General    Surgical Staff:  Circulator: Gloria Abdul R.N.  Scrub Person: Daria Watson    Specimens: Right 4th MTH and 5th MTH for culture    Estimated Blood Loss: 10cc    TT: 22 min @ 250mmHg    Findings: Necrotic 4th MTH, necrotic pros 4/5 PP    Complications: None        12/29/2017 6:05 PM Masoud Villegas

## 2017-12-30 NOTE — CONSULTS
12/29/2017    Barrett Frederick is a 62 y.o. male who presents with a nonhealing DM foot ulcer plantar right 4th MTH.  Admitted yesterday for care, xray evidence osteo to 4th and possibly 5th MTH and base of respective proximal phalanges.      Past Medical History:   Diagnosis Date   • CAD (coronary artery disease)    • Chronic obstructive pulmonary disease (CMS-HCC)    • Congestive heart failure (CMS-HCC)    • Diabetes (CMS-[facility])        Past Surgical History:   Procedure Laterality Date   • THROMBECTOMY Left 2/24/2016    Procedure: THROMBECTOMY;  Surgeon: Amber Botello M.D.;  Location: SURGERY [facility];  Service:        Medications  No current facility-administered medications on file prior to encounter.      Current Outpatient Prescriptions on File Prior to Encounter   Medication Sig Dispense Refill   • gabapentin (NEURONTIN) 100 MG Cap Take 1 Cap by mouth 3 times a day. (Patient taking differently: Take 300 mg by mouth 3 times a day.) 90 Cap 3   • atorvastatin (LIPITOR) 20 MG Tab Take 1 Tab by mouth every bedtime. (Patient taking differently: Take 20 mg by mouth every day.) 30 Tab 11   • insulin glargine (LANTUS) 100 UNIT/ML Solution Inject 24 Units as instructed every evening. 10 mL 3   • insulin lispro (HUMALOG) 100 UNIT/ML Solution Inject 1-6 Units as instructed 3 times a day before meals. 100 mL 3   • metformin (GLUCOPHAGE) 1000 MG tablet Take 1,000 mg by mouth 2 times a day, with meals.     • atenolol (TENORMIN) 50 MG Tab Take 50 mg by mouth 3 times a day.     • aspirin EC (ECOTRIN) 81 MG Tablet Delayed Response Take 81 mg by mouth every day.     • omeprazole (PRILOSEC) 20 MG delayed-release capsule Take 20 mg by mouth every day.     • clopidogrel (PLAVIX) 75 MG Tab Take 75 mg by mouth every day.     • pentoxifylline CR (TRENTAL) 400 MG CR tablet Take 400 mg by mouth 3 times a day, with meals.     • isosorbide dinitrate (ISORDIL) 20 MG Tab Take 20 mg by mouth every day.     • NIFEdipine (ADALAT CC) 30 MG  "CR tablet Take 30 mg by mouth every day.     • acetaminophen (TYLENOL) 325 MG Tab Take 1,950 mg by mouth every 6 hours as needed.         Allergies  Patient has no known allergies.    ROS  All other systems were reviewed and found to be negative    History reviewed. No pertinent family history.    Social History     Social History   • Marital status:      Spouse name: N/A   • Number of children: N/A   • Years of education: N/A     Social History Main Topics   • Smoking status: Current Every Day Smoker     Packs/day: 2.00     Types: Cigarettes   • Smokeless tobacco: Not on file   • Alcohol use Yes   • Drug use: No   • Sexual activity: Not on file     Other Topics Concern   • Not on file     Social History Narrative   • No narrative on file       Physical Exam  Vitals  Blood pressure 138/68, pulse (!) 55, temperature 36.4 °C (97.6 °F), resp. rate 18, height 1.753 m (5' 9\"), weight 98.2 kg (216 lb 7.9 oz), SpO2 97 %.  General: Well Developed, Well Nourished, no acute distress  Psychiatric: Alert and oriented x3, appropriate responses to questions, pleasant mood and affect.  RLE: ulceration with surrounding callous plantar 4th MTH, tracking to MPT joint.  Dec sens stocking dist to above ankle.  Faintly palp DP pulse.      Radiographs:  LE ART/TATY         LE ART DUPLX/IMAG         DX-FOOT-COMPLETE 3+ RIGHT   Final Result      Distal lateral soft tissue swelling and gas consistent with infection.   Destruction of the distal fourth metatarsal base of the proximal phalanx consistent with osteomyelitis.   Possible osteomyelitis involving the distal fifth metatarsal base of the fifth proximal phalanx.          Laboratory Values  Recent Labs      12/28/17   1354  12/29/17   0211   WBC  9.8  9.9   RBC  5.31  4.60*   HEMOGLOBIN  15.0  12.9*   HEMATOCRIT  45.0  39.1*   MCV  84.7  85.0   MCH  28.2  28.0   MCHC  33.3*  33.0*   RDW  44.1  44.1   PLATELETCT  232  197   MPV  9.3  9.6     Recent Labs      12/28/17   1354  " 12/29/17   0211   SODIUM  137  134*   POTASSIUM  3.9  4.1   CHLORIDE  104  106   CO2  25  21   GLUCOSE  163*  246*   BUN  11  10     Recent Labs      12/28/17   1354   INR  1.49*         Impression:    #1 Right 4th MTH plantar ulceration with radiographic evidence 4/5 MTH osteo    Plan:    NPO for OR today  Plan for Right foot I&D, GSR, 4/5 MTH and prox phalanx excisions  WBAT in diabetic boot postoperatively, boot at all times    Masoud Villegas MD

## 2017-12-31 LAB
ANION GAP SERPL CALC-SCNC: 6 MMOL/L (ref 0–11.9)
BUN SERPL-MCNC: 11 MG/DL (ref 8–22)
CALCIUM SERPL-MCNC: 8.1 MG/DL (ref 8.5–10.5)
CHLORIDE SERPL-SCNC: 105 MMOL/L (ref 96–112)
CO2 SERPL-SCNC: 21 MMOL/L (ref 20–33)
CREAT SERPL-MCNC: 0.73 MG/DL (ref 0.5–1.4)
ERYTHROCYTE [DISTWIDTH] IN BLOOD BY AUTOMATED COUNT: 44.7 FL (ref 35.9–50)
GFR SERPL CREATININE-BSD FRML MDRD: >60 ML/MIN/1.73 M 2
GLUCOSE BLD-MCNC: 167 MG/DL (ref 65–99)
GLUCOSE BLD-MCNC: 172 MG/DL (ref 65–99)
GLUCOSE BLD-MCNC: 173 MG/DL (ref 65–99)
GLUCOSE BLD-MCNC: 179 MG/DL (ref 65–99)
GLUCOSE SERPL-MCNC: 168 MG/DL (ref 65–99)
HCT VFR BLD AUTO: 35.1 % (ref 42–52)
HGB BLD-MCNC: 11.5 G/DL (ref 14–18)
MCH RBC QN AUTO: 27.9 PG (ref 27–33)
MCHC RBC AUTO-ENTMCNC: 32.8 G/DL (ref 33.7–35.3)
MCV RBC AUTO: 85.2 FL (ref 81.4–97.8)
PLATELET # BLD AUTO: 167 K/UL (ref 164–446)
PMV BLD AUTO: 9.6 FL (ref 9–12.9)
POTASSIUM SERPL-SCNC: 3.7 MMOL/L (ref 3.6–5.5)
RBC # BLD AUTO: 4.12 M/UL (ref 4.7–6.1)
SODIUM SERPL-SCNC: 132 MMOL/L (ref 135–145)
VANCOMYCIN TROUGH SERPL-MCNC: 23 UG/ML (ref 10–20)
WBC # BLD AUTO: 11.2 K/UL (ref 4.8–10.8)

## 2017-12-31 PROCEDURE — 770006 HCHG ROOM/CARE - MED/SURG/GYN SEMI*

## 2017-12-31 PROCEDURE — 80202 ASSAY OF VANCOMYCIN: CPT

## 2017-12-31 PROCEDURE — 700111 HCHG RX REV CODE 636 W/ 250 OVERRIDE (IP)

## 2017-12-31 PROCEDURE — 85027 COMPLETE CBC AUTOMATED: CPT

## 2017-12-31 PROCEDURE — 700105 HCHG RX REV CODE 258: Performed by: INTERNAL MEDICINE

## 2017-12-31 PROCEDURE — 82962 GLUCOSE BLOOD TEST: CPT | Mod: 91

## 2017-12-31 PROCEDURE — 700111 HCHG RX REV CODE 636 W/ 250 OVERRIDE (IP): Performed by: STUDENT IN AN ORGANIZED HEALTH CARE EDUCATION/TRAINING PROGRAM

## 2017-12-31 PROCEDURE — 36415 COLL VENOUS BLD VENIPUNCTURE: CPT

## 2017-12-31 PROCEDURE — 80048 BASIC METABOLIC PNL TOTAL CA: CPT

## 2017-12-31 PROCEDURE — 700105 HCHG RX REV CODE 258

## 2017-12-31 PROCEDURE — 700111 HCHG RX REV CODE 636 W/ 250 OVERRIDE (IP): Performed by: INTERNAL MEDICINE

## 2017-12-31 PROCEDURE — A9270 NON-COVERED ITEM OR SERVICE: HCPCS | Performed by: INTERNAL MEDICINE

## 2017-12-31 PROCEDURE — 700102 HCHG RX REV CODE 250 W/ 637 OVERRIDE(OP): Performed by: INTERNAL MEDICINE

## 2017-12-31 PROCEDURE — 99232 SBSQ HOSP IP/OBS MODERATE 35: CPT | Performed by: INTERNAL MEDICINE

## 2017-12-31 RX ADMIN — ENOXAPARIN SODIUM 100 MG: 100 INJECTION SUBCUTANEOUS at 20:34

## 2017-12-31 RX ADMIN — MORPHINE SULFATE 3 MG: 4 INJECTION INTRAVENOUS at 03:01

## 2017-12-31 RX ADMIN — MORPHINE SULFATE 3 MG: 4 INJECTION INTRAVENOUS at 06:27

## 2017-12-31 RX ADMIN — INSULIN HUMAN 1 UNITS: 100 INJECTION, SOLUTION PARENTERAL at 10:52

## 2017-12-31 RX ADMIN — ATENOLOL 50 MG: 50 TABLET ORAL at 07:49

## 2017-12-31 RX ADMIN — SODIUM CHLORIDE: 9 INJECTION, SOLUTION INTRAVENOUS at 11:00

## 2017-12-31 RX ADMIN — INSULIN GLARGINE 24 UNITS: 100 INJECTION, SOLUTION SUBCUTANEOUS at 20:39

## 2017-12-31 RX ADMIN — GABAPENTIN 100 MG: 100 CAPSULE ORAL at 15:26

## 2017-12-31 RX ADMIN — NIFEDIPINE 30 MG: 30 TABLET, FILM COATED, EXTENDED RELEASE ORAL at 07:50

## 2017-12-31 RX ADMIN — ATENOLOL 50 MG: 50 TABLET ORAL at 20:32

## 2017-12-31 RX ADMIN — CLOPIDOGREL 75 MG: 75 TABLET, FILM COATED ORAL at 20:33

## 2017-12-31 RX ADMIN — INSULIN HUMAN 1 UNITS: 100 INJECTION, SOLUTION PARENTERAL at 20:39

## 2017-12-31 RX ADMIN — ENOXAPARIN SODIUM 100 MG: 100 INJECTION SUBCUTANEOUS at 07:49

## 2017-12-31 RX ADMIN — PENTOXIFYLLINE 400 MG: 400 TABLET, FILM COATED, EXTENDED RELEASE ORAL at 10:51

## 2017-12-31 RX ADMIN — GABAPENTIN 100 MG: 100 CAPSULE ORAL at 07:49

## 2017-12-31 RX ADMIN — MORPHINE SULFATE 3 MG: 4 INJECTION INTRAVENOUS at 12:28

## 2017-12-31 RX ADMIN — OMEPRAZOLE 20 MG: 20 CAPSULE, DELAYED RELEASE ORAL at 07:50

## 2017-12-31 RX ADMIN — VANCOMYCIN HYDROCHLORIDE 1600 MG: 100 INJECTION, POWDER, LYOPHILIZED, FOR SOLUTION INTRAVENOUS at 12:35

## 2017-12-31 RX ADMIN — ASPIRIN 81 MG: 81 TABLET, COATED ORAL at 07:49

## 2017-12-31 RX ADMIN — ATORVASTATIN CALCIUM 20 MG: 20 TABLET, FILM COATED ORAL at 20:32

## 2017-12-31 RX ADMIN — PENTOXIFYLLINE 400 MG: 400 TABLET, FILM COATED, EXTENDED RELEASE ORAL at 18:33

## 2017-12-31 RX ADMIN — MORPHINE SULFATE 3 MG: 4 INJECTION INTRAVENOUS at 15:26

## 2017-12-31 RX ADMIN — GABAPENTIN 100 MG: 100 CAPSULE ORAL at 20:33

## 2017-12-31 RX ADMIN — ATENOLOL 50 MG: 50 TABLET ORAL at 15:26

## 2017-12-31 RX ADMIN — MORPHINE SULFATE 3 MG: 4 INJECTION INTRAVENOUS at 18:28

## 2017-12-31 RX ADMIN — FUROSEMIDE 10 MG: 20 TABLET ORAL at 07:49

## 2017-12-31 RX ADMIN — INSULIN HUMAN 1 UNITS: 100 INJECTION, SOLUTION PARENTERAL at 16:03

## 2017-12-31 RX ADMIN — MORPHINE SULFATE 3 MG: 4 INJECTION INTRAVENOUS at 09:14

## 2017-12-31 RX ADMIN — ISOSORBIDE DINITRATE 20 MG: 10 TABLET ORAL at 07:50

## 2017-12-31 RX ADMIN — MORPHINE SULFATE 3 MG: 4 INJECTION INTRAVENOUS at 21:49

## 2017-12-31 RX ADMIN — PENTOXIFYLLINE 400 MG: 400 TABLET, FILM COATED, EXTENDED RELEASE ORAL at 07:50

## 2017-12-31 RX ADMIN — INSULIN HUMAN 1 UNITS: 100 INJECTION, SOLUTION PARENTERAL at 05:32

## 2017-12-31 RX ADMIN — ACETAMINOPHEN 650 MG: 325 TABLET, FILM COATED ORAL at 07:17

## 2017-12-31 RX ADMIN — MORPHINE SULFATE 3 MG: 4 INJECTION INTRAVENOUS at 00:00

## 2017-12-31 RX ADMIN — VANCOMYCIN HYDROCHLORIDE 1600 MG: 100 INJECTION, POWDER, LYOPHILIZED, FOR SOLUTION INTRAVENOUS at 01:12

## 2017-12-31 ASSESSMENT — PAIN SCALES - GENERAL
PAINLEVEL_OUTOF10: ASSUMED PAIN PRESENT
PAINLEVEL_OUTOF10: 9
PAINLEVEL_OUTOF10: 6
PAINLEVEL_OUTOF10: 6
PAINLEVEL_OUTOF10: 9
PAINLEVEL_OUTOF10: 7
PAINLEVEL_OUTOF10: ASSUMED PAIN PRESENT
PAINLEVEL_OUTOF10: 9
PAINLEVEL_OUTOF10: 5
PAINLEVEL_OUTOF10: 10
PAINLEVEL_OUTOF10: 8
PAINLEVEL_OUTOF10: 6
PAINLEVEL_OUTOF10: 9
PAINLEVEL_OUTOF10: 9

## 2017-12-31 ASSESSMENT — ENCOUNTER SYMPTOMS
ABDOMINAL PAIN: 0
PALPITATIONS: 0
DIZZINESS: 0
MYALGIAS: 0
VOMITING: 0
COUGH: 0
FOCAL WEAKNESS: 0
NAUSEA: 0
SHORTNESS OF BREATH: 0
CHILLS: 0
MYALGIAS: 1
FEVER: 0
BLURRED VISION: 0

## 2017-12-31 ASSESSMENT — PATIENT HEALTH QUESTIONNAIRE - PHQ9
SUM OF ALL RESPONSES TO PHQ9 QUESTIONS 1 AND 2: 0
SUM OF ALL RESPONSES TO PHQ QUESTIONS 1-9: 0
1. LITTLE INTEREST OR PLEASURE IN DOING THINGS: NOT AT ALL
2. FEELING DOWN, DEPRESSED, IRRITABLE, OR HOPELESS: NOT AT ALL

## 2017-12-31 ASSESSMENT — LIFESTYLE VARIABLES: DO YOU DRINK ALCOHOL: NO

## 2017-12-31 NOTE — PROGRESS NOTES
Received shift report from Nicolás RODRIGUEZ and assumed care of this pt at 0715. Pt A&O x4, laying in bed.  Pt reports pain is 10/10,pt is resting until his next pain medication. Pt does call appropriately Pt is SBA , steady. PIV assessed and is patent.  POC discussed as well as unit routine, comfort, and safety. Patient has call light and personal belongings within reach. Safety and fall precautions in place. Reviewed orders, notes, labs, and test results. Hourly rounding in place with RN rounding on odd hours and CNA on even hours.

## 2017-12-31 NOTE — PROGRESS NOTES
[facility] Hospitalist Progress Note    Date of Service: 2017    Chief Complaint  62 y.o. male admitted 2017 with right foot osteomyelitis     Interval Problem Update   surgical debridement done on 17  for his right foot osteomyelitis. Still with some leg pain, afebrile no overnight events.   Ortho, ID and LPS following  following appreciate rec     Consultants/Specialty  ID  LPS  Ortho     Disposition  TBD      Review of Systems   Constitutional: Negative for chills and fever.   HENT: Negative for hearing loss.    Eyes: Negative for blurred vision.   Cardiovascular: Negative for chest pain and palpitations.   Gastrointestinal: Negative for nausea and vomiting.   Musculoskeletal: Negative for myalgias.        Foot pain     Skin: Negative for rash.   Neurological: Negative for dizziness.      Physical Exam  Laboratory/Imaging   Hemodynamics  Temp (24hrs), Av.7 °C (98.1 °F), Min:36.6 °C (97.8 °F), Max:36.9 °C (98.4 °F)   Temperature: 36.7 °C (98 °F)  Pulse  Av.8  Min: 53  Max: 73    Blood Pressure: 113/70      Respiratory      Respiration: 18, Pulse Oximetry: 96 %        RUL Breath Sounds: Clear, RML Breath Sounds: Diminished, RLL Breath Sounds: Diminished, NATASHA Breath Sounds: Clear, LLL Breath Sounds: Diminished    Fluids    Intake/Output Summary (Last 24 hours) at 17 1055  Last data filed at 17 0700   Gross per 24 hour   Intake                0 ml   Output              300 ml   Net             -300 ml       Nutrition  Orders Placed This Encounter   Procedures   • DIET ORDER     Standing Status:   Standing     Number of Occurrences:   1     Order Specific Question:   Diet:     Answer:   Diabetic [3]     Order Specific Question:   Diet:     Answer:   Cardiac [6]     Physical Exam   HENT:   Head: Normocephalic and atraumatic.   Eyes: Conjunctivae are normal. No scleral icterus.   Neck: Neck supple. No JVD present.   Cardiovascular: Normal rate.    No murmur heard.  Pulmonary/Chest: He  Spoke with Real BEVERLY. He stated that pt was cleared for duty if restrictions could be accommodated by [employer]. Informed him they can't be accommodated.    He asked if [employer] would like pt on hold, until 8-16-17 when pt is seen by pt's doctor, which he would change his paperwork to reflect that or would they like pt to be seen again?    Informed provider that [employer] is gone for the day and will call them tomorrow, 7-27-17.    has no wheezes. He has no rales.   Abdominal: Soft. Bowel sounds are normal. He exhibits no distension. There is no tenderness.   Musculoskeletal:   ulcer with no purulent discharge at the base of the right 5th right toe   Nursing note and vitals reviewed.      Recent Labs      12/29/17 0211 12/30/17 0214 12/31/17 0220   WBC  9.9  11.7*  11.2*   RBC  4.60*  4.37*  4.12*   HEMOGLOBIN  12.9*  12.4*  11.5*   HEMATOCRIT  39.1*  37.0*  35.1*   MCV  85.0  84.7  85.2   MCH  28.0  28.4  27.9   MCHC  33.0*  33.5*  32.8*   RDW  44.1  44.0  44.7   PLATELETCT  197  190  167   MPV  9.6  9.6  9.6     Recent Labs      12/29/17 0211 12/30/17 0214 12/31/17 0220   SODIUM  134*  136  132*   POTASSIUM  4.1  3.8  3.7   CHLORIDE  106  109  105   CO2  21  22  21   GLUCOSE  246*  178*  168*   BUN  10  10  11   CREATININE  0.81  0.85  0.73   CALCIUM  8.4*  8.3*  8.1*     Recent Labs      12/28/17   1354   INR  1.49*                  Assessment/Plan     * Osteomyelitis of right foot (CMS-HCC)- (present on admission)   Assessment & Plan    Xray foot showing osteomyelitis  Had surgery on 12/31/17    Ortho, ID and LPS following appreciate rec.   cont IV abx as per ID rec.            Chronic anticoagulation- (present on admission)   Assessment & Plan    -Check INR, hold outpatient Coumadin given possible surgical intervention and continue outpatient weight-based Lovenox        CHF (congestive heart failure) (CMS-HCC)- (present on admission)   Assessment & Plan    -Pt has chronic CHF   Currently well compensated continue outpatient medications no exacerbation         DM (diabetes mellitus) (CMS-HCC)- (present on admission)   Assessment & Plan    -A1C 9.7   -Continue  Lantus along with SSI          COPD (chronic obstructive pulmonary disease) (CMS-HCC)- (present on admission)   Assessment & Plan    -At his baseline with no current exacerbation, do respiratory therapy protocol and continue outpatient bronchodilators as needed         Coronary artery disease- (present on admission)   Assessment & Plan    -No current chest pain continue outpatient cardiac medications        Essential hypertension- (present on admission)   Assessment & Plan    -Well-controlled   -continue outpatient blood pressure medication        PAOD (peripheral arterial occlusive disease) (CMS-[facility])- (present on admission)   Assessment & Plan    -Continue outpatient medications and monitor closely, arterial studies outlined above            Reviewed items::  Labs reviewed, Radiology images reviewed and Medications reviewed  Dorsey catheter::  No Dorsey  DVT prophylaxis pharmacological::  Enoxaparin (Lovenox)  Antibiotics:  Treating active infection/contamination beyond 24 hours perioperative coverage

## 2017-12-31 NOTE — CARE PLAN
Problem: Venous Thromboembolism (VTW)/Deep Vein Thrombosis (DVT) Prevention:  Goal: Patient will participate in Venous Thrombosis (VTE)/Deep Vein Thrombosis (DVT)Prevention Measures  Outcome: PROGRESSING AS EXPECTED  No s/s of DVT. Pt on lovenox. He refuses his SCDs Pt ambulates occasionally.     Problem: Knowledge Deficit  Goal: Knowledge of the prescribed therapeutic regimen will improve  Outcome: PROGRESSING AS EXPECTED  PT educated on medications and therapeutic regimen.

## 2017-12-31 NOTE — PROGRESS NOTES
Pt is AAO x4.  Pt reports a 9/10 R foot pain level.  Understands when next pain med is due.  VS WNL.  R Foot dressing and boot in place, CDI.  PIVs x2 in place, CDI.  POC discussed.  All needs met at this time.  Bed in low position.  Call light within reach.  Rounding in place.

## 2017-12-31 NOTE — CARE PLAN
Problem: Infection  Goal: Will remain free from infection  VS WNL. AB in place. Will ensure daily dressing to foot is completed, will assess wound for signs of infection.    Problem: Pain Management  Goal: Pain level will decrease to patient's comfort goal  Pt consistently reporting a 9-10/10 pain level. Will ensure pain meds are given on time and attempt to keep at comfort goal.

## 2017-12-31 NOTE — CARE PLAN
Problem: Venous Thromboembolism (VTW)/Deep Vein Thrombosis (DVT) Prevention:  Goal: Patient will participate in Venous Thrombosis (VTE)/Deep Vein Thrombosis (DVT)Prevention Measures  Outcome: PROGRESSING AS EXPECTED  No s/s of DVT. Pt is on lovenox. Pt refuses the SCDs. Pt moves around and ambulates occasionally.     Problem: Knowledge Deficit  Goal: Knowledge of the prescribed therapeutic regimen will improve  Outcome: PROGRESSING AS EXPECTED  Pt educated about medications and therapuetic regimen. Pt  Will take meds,  Besides senna. Though, that is good because he has BM regularly. Though pt still refuses SCDs.

## 2017-12-31 NOTE — PROGRESS NOTES
"Pharmacy Kinetics 62 y.o. male on vancomycin day # 3   2017    Currently on Vancomycin 1600 mg mg iv q12hr (00, 12)    Indication for Treatment: right foot diabetic foot ulcer possible oseomyelitis     Pertinent history per medical record: Admitted on 2017 for right foot diabetic foot ulcer.  Poorly controlled DM, hx of multiple blood clots, on warfarin at home.  Hx of PVD, on pentoxifylline.  Has been taking Augmentin at home for the infected foot.  Now S/P 4th and 5th metatarsal head excision, gastroscoleous recession.      Other antibiotics: Piperacillin/Tazobactam 10.125 g continuous IV     Allergies: Patient has no known allergies.      List concerns for renal function : DM poorly controlled with know PVD.  Obese.     Pertinent cultures to date:   17:PBCx2:NGTD  17:4th Metatarsal,Tiss:NGTD  17:5th Metatarsal,Tiss:NGTD    Imagin17:Dx Ft:Distal lateral soft tissue swelling and gas consistent with infection.Destruction of the distal fourth metatarsal base of the proximal phalanx consistent with osteomyelitis.Possible osteomyelitis involving the distal fifth metatarsal base of the fifth proximal phalanx.    Recent Labs      17   1354  17   0211  17   0214  17   0220   WBC  9.8  9.9  11.7*  11.2*   NEUTSPOLYS  60.40  64.10   --    --      Recent Labs      17   1354  17   0211  17   0214  17   0220   BUN  11  10  10  11   CREATININE  0.83  0.81  0.85  0.73     No results for input(s): VANCOTROUGH, VANCOPEAK, VANCORANDOM in the last 72 hours.  Intake/Output Summary (Last 24 hours) at 17 0819  Last data filed at 17 0700   Gross per 24 hour   Intake                0 ml   Output              300 ml   Net             -300 ml      Blood pressure 113/70, pulse (!) 56, temperature 36.7 °C (98 °F), resp. rate 18, height 1.753 m (5' 9\"), weight 98.2 kg (216 lb 7.9 oz), SpO2 96 %. Temp (24hrs), Av.7 °C (98.1 °F), Min:36.6 °C " (97.8 °F), Max:36.9 °C (98.4 °F)      A/P   1. Vancomycin dose change: No change   2. Next vancomycin level: 12/31/17@1130  3. Goal trough: 12-16 mcg/mL   4. Comments: Wbc mildly elevated, Dx ft possible osteo. Afebrile over interval, cx NGTD. Renal labs appear stable. Continue current dose , level today. ID following.     Blas Butler PharmD BCPS        12/31/2017 11:25   Vancomycin Trough 23.0 (H)     · Vancomycin dose change: Vancomycin 1100 mg iv q12hr (0600 1800)  · Next Level 1-2 days     Blas Butler PharmD BCPS

## 2018-01-01 LAB
BACTERIA SPEC ANAEROBE CULT: NORMAL
BACTERIA SPEC ANAEROBE CULT: NORMAL
GLUCOSE BLD-MCNC: 137 MG/DL (ref 65–99)
GLUCOSE BLD-MCNC: 180 MG/DL (ref 65–99)
GLUCOSE BLD-MCNC: 182 MG/DL (ref 65–99)
GLUCOSE BLD-MCNC: 196 MG/DL (ref 65–99)
SIGNIFICANT IND 70042: NORMAL
SIGNIFICANT IND 70042: NORMAL
SITE SITE: NORMAL
SITE SITE: NORMAL
SOURCE SOURCE: NORMAL
SOURCE SOURCE: NORMAL

## 2018-01-01 PROCEDURE — 770006 HCHG ROOM/CARE - MED/SURG/GYN SEMI*

## 2018-01-01 PROCEDURE — 700105 HCHG RX REV CODE 258: Performed by: INTERNAL MEDICINE

## 2018-01-01 PROCEDURE — 700111 HCHG RX REV CODE 636 W/ 250 OVERRIDE (IP): Performed by: INTERNAL MEDICINE

## 2018-01-01 PROCEDURE — G8988 SELF CARE GOAL STATUS: HCPCS | Mod: CI

## 2018-01-01 PROCEDURE — 700111 HCHG RX REV CODE 636 W/ 250 OVERRIDE (IP): Performed by: STUDENT IN AN ORGANIZED HEALTH CARE EDUCATION/TRAINING PROGRAM

## 2018-01-01 PROCEDURE — 700111 HCHG RX REV CODE 636 W/ 250 OVERRIDE (IP): Mod: JG | Performed by: INTERNAL MEDICINE

## 2018-01-01 PROCEDURE — A9270 NON-COVERED ITEM OR SERVICE: HCPCS | Performed by: INTERNAL MEDICINE

## 2018-01-01 PROCEDURE — 99232 SBSQ HOSP IP/OBS MODERATE 35: CPT | Performed by: INTERNAL MEDICINE

## 2018-01-01 PROCEDURE — G8987 SELF CARE CURRENT STATUS: HCPCS | Mod: CI

## 2018-01-01 PROCEDURE — G8989 SELF CARE D/C STATUS: HCPCS | Mod: CI

## 2018-01-01 PROCEDURE — 700111 HCHG RX REV CODE 636 W/ 250 OVERRIDE (IP)

## 2018-01-01 PROCEDURE — 82962 GLUCOSE BLOOD TEST: CPT

## 2018-01-01 PROCEDURE — 700105 HCHG RX REV CODE 258

## 2018-01-01 PROCEDURE — 700102 HCHG RX REV CODE 250 W/ 637 OVERRIDE(OP): Performed by: INTERNAL MEDICINE

## 2018-01-01 PROCEDURE — 97165 OT EVAL LOW COMPLEX 30 MIN: CPT

## 2018-01-01 RX ADMIN — ATENOLOL 50 MG: 50 TABLET ORAL at 21:11

## 2018-01-01 RX ADMIN — ISOSORBIDE DINITRATE 20 MG: 10 TABLET ORAL at 07:33

## 2018-01-01 RX ADMIN — ATENOLOL 50 MG: 50 TABLET ORAL at 14:59

## 2018-01-01 RX ADMIN — ENOXAPARIN SODIUM 100 MG: 100 INJECTION SUBCUTANEOUS at 21:11

## 2018-01-01 RX ADMIN — ATENOLOL 50 MG: 50 TABLET ORAL at 07:33

## 2018-01-01 RX ADMIN — FUROSEMIDE 10 MG: 20 TABLET ORAL at 07:33

## 2018-01-01 RX ADMIN — MORPHINE SULFATE 3 MG: 4 INJECTION INTRAVENOUS at 21:11

## 2018-01-01 RX ADMIN — PENTOXIFYLLINE 400 MG: 400 TABLET, FILM COATED, EXTENDED RELEASE ORAL at 10:52

## 2018-01-01 RX ADMIN — SODIUM CHLORIDE: 9 INJECTION, SOLUTION INTRAVENOUS at 12:00

## 2018-01-01 RX ADMIN — GABAPENTIN 100 MG: 100 CAPSULE ORAL at 14:58

## 2018-01-01 RX ADMIN — PENTOXIFYLLINE 400 MG: 400 TABLET, FILM COATED, EXTENDED RELEASE ORAL at 16:17

## 2018-01-01 RX ADMIN — ASPIRIN 81 MG: 81 TABLET, COATED ORAL at 07:33

## 2018-01-01 RX ADMIN — MORPHINE SULFATE 3 MG: 4 INJECTION INTRAVENOUS at 00:26

## 2018-01-01 RX ADMIN — GABAPENTIN 100 MG: 100 CAPSULE ORAL at 21:11

## 2018-01-01 RX ADMIN — INSULIN HUMAN 1 UNITS: 100 INJECTION, SOLUTION PARENTERAL at 10:56

## 2018-01-01 RX ADMIN — INSULIN GLARGINE 24 UNITS: 100 INJECTION, SOLUTION SUBCUTANEOUS at 21:11

## 2018-01-01 RX ADMIN — DAPTOMYCIN 600 MG: 500 INJECTION, POWDER, LYOPHILIZED, FOR SOLUTION INTRAVENOUS at 16:17

## 2018-01-01 RX ADMIN — NIFEDIPINE 30 MG: 30 TABLET, FILM COATED, EXTENDED RELEASE ORAL at 07:33

## 2018-01-01 RX ADMIN — PENTOXIFYLLINE 400 MG: 400 TABLET, FILM COATED, EXTENDED RELEASE ORAL at 07:33

## 2018-01-01 RX ADMIN — MORPHINE SULFATE 3 MG: 4 INJECTION INTRAVENOUS at 03:00

## 2018-01-01 RX ADMIN — MORPHINE SULFATE 3 MG: 4 INJECTION INTRAVENOUS at 11:57

## 2018-01-01 RX ADMIN — VANCOMYCIN HYDROCHLORIDE 1100 MG: 100 INJECTION, POWDER, LYOPHILIZED, FOR SOLUTION INTRAVENOUS at 05:40

## 2018-01-01 RX ADMIN — ENOXAPARIN SODIUM 100 MG: 100 INJECTION SUBCUTANEOUS at 07:33

## 2018-01-01 RX ADMIN — MORPHINE SULFATE 3 MG: 4 INJECTION INTRAVENOUS at 14:58

## 2018-01-01 RX ADMIN — OMEPRAZOLE 20 MG: 20 CAPSULE, DELAYED RELEASE ORAL at 07:33

## 2018-01-01 RX ADMIN — INSULIN HUMAN 1 UNITS: 100 INJECTION, SOLUTION PARENTERAL at 16:24

## 2018-01-01 RX ADMIN — MORPHINE SULFATE 3 MG: 4 INJECTION INTRAVENOUS at 18:01

## 2018-01-01 RX ADMIN — CLOPIDOGREL 75 MG: 75 TABLET, FILM COATED ORAL at 21:11

## 2018-01-01 RX ADMIN — GABAPENTIN 100 MG: 100 CAPSULE ORAL at 07:33

## 2018-01-01 RX ADMIN — MORPHINE SULFATE 3 MG: 4 INJECTION INTRAVENOUS at 08:59

## 2018-01-01 RX ADMIN — MORPHINE SULFATE 3 MG: 4 INJECTION INTRAVENOUS at 06:05

## 2018-01-01 RX ADMIN — INSULIN HUMAN 1 UNITS: 100 INJECTION, SOLUTION PARENTERAL at 21:11

## 2018-01-01 RX ADMIN — ATORVASTATIN CALCIUM 20 MG: 20 TABLET, FILM COATED ORAL at 21:11

## 2018-01-01 ASSESSMENT — ENCOUNTER SYMPTOMS
CHILLS: 0
SHORTNESS OF BREATH: 0
ABDOMINAL PAIN: 0
BLURRED VISION: 0
MYALGIAS: 1
FEVER: 0
VOMITING: 0
FOCAL WEAKNESS: 0
COUGH: 0
DIZZINESS: 0
MYALGIAS: 0
NAUSEA: 0
PALPITATIONS: 0

## 2018-01-01 ASSESSMENT — ACTIVITIES OF DAILY LIVING (ADL): TOILETING: INDEPENDENT

## 2018-01-01 ASSESSMENT — PAIN SCALES - GENERAL
PAINLEVEL_OUTOF10: 9
PAINLEVEL_OUTOF10: 9
PAINLEVEL_OUTOF10: 6
PAINLEVEL_OUTOF10: 8
PAINLEVEL_OUTOF10: 6
PAINLEVEL_OUTOF10: 7
PAINLEVEL_OUTOF10: 8

## 2018-01-01 ASSESSMENT — COGNITIVE AND FUNCTIONAL STATUS - GENERAL
DAILY ACTIVITIY SCORE: 22
DRESSING REGULAR LOWER BODY CLOTHING: A LITTLE
SUGGESTED CMS G CODE MODIFIER DAILY ACTIVITY: CJ
HELP NEEDED FOR BATHING: A LITTLE

## 2018-01-01 NOTE — CARE PLAN
Problem: Knowledge Deficit  Goal: Knowledge of disease process/condition, treatment plan, diagnostic tests, and medications will improve  POC discussed. Pt understands this RN will page ID before changing wound to R foot, in order for ID to assess.     Problem: Mobility  Goal: Risk for activity intolerance will decrease  Will ensure pt is up for meals and attempt to ambulate to bathroom, instead of using urinal.

## 2018-01-01 NOTE — CARE PLAN
Problem: Venous Thromboembolism (VTW)/Deep Vein Thrombosis (DVT) Prevention:  Goal: Patient will participate in Venous Thrombosis (VTE)/Deep Vein Thrombosis (DVT)Prevention Measures  Outcome: PROGRESSING SLOWER THAN EXPECTED  Pt sits at the edge of bed and up in bed.     Problem: Pain Management  Goal: Pain level will decrease to patient's comfort goal  Pain on RLE controlled with elevation and morphine 3 mg.

## 2018-01-01 NOTE — PROGRESS NOTES
Pt is AAO x4.  Pt reports a 9/10 R foot pain level.  Understands when next pain med is due.  VS WNL.  R Foot dressing and boot in place, CDI.  PIV in place, running fluids.  POC discussed.  All needs met at this time.  Bed in low position.  Call light within reach.  Rounding in place.

## 2018-01-01 NOTE — THERAPY
"Occupational Therapy Evaluation completed.   Functional Status:  Supervision supine to sit.  Min A LB dressing.  Pt walked hallway with FWW supervised.  Pt is eager to DC home.  Pt educated on LB dressing and showers.  Pt reports no further concerns with self-care at this time.  Plan of Care: Patient with no further skilled OT needs in the acute care setting at this time  Discharge Recommendations:  Equipment: No Equipment Needed. Post-acute therapy Discharge to home with outpatient or home health for additional skilled therapy services.    See \"Rehab Therapy-Acute\" Patient Summary Report for complete documentation.    "

## 2018-01-01 NOTE — CARE PLAN
Problem: Pain Management  Goal: Pain level will decrease to patient's comfort goal  Pain controlled with morphine 3 mg, elevation, and rest/    Problem: Mobility  Goal: Risk for activity intolerance will decrease  Outcome: PROGRESSING SLOWER THAN EXPECTED    Pt sits up at the edge of the bed but afraid

## 2018-01-01 NOTE — PROGRESS NOTES
Infectious Disease Progress Note    Author: Maria M Delgado M.D. Date & Time of service: 2018  12:43 PM    Chief Complaint:  Diabetic foot infection    Interval History:  -MAXIMUM TEMPERATURE 98.4 WBC 11.7 creatinine 0.85. No new issues overnight  17-MAXIMUM TEMPERATURE 98.4. No new issues overnight  2018-MAXIMUM TEMPERATURE 98.8. No new labs available. Anxious to go home  Labs Reviewed, Medications Reviewed, Radiology Reviewed and Wound Reviewed.    Review of Systems:  Review of Systems   Constitutional: Negative for fever.   HENT: Negative for hearing loss.    Respiratory: Negative for cough and shortness of breath.    Cardiovascular: Negative for chest pain.   Gastrointestinal: Negative for abdominal pain, nausea and vomiting.   Genitourinary: Negative for dysuria.   Musculoskeletal: Positive for myalgias.   Neurological: Negative for focal weakness.       Hemodynamics:  Temp (24hrs), Av.8 °C (98.2 °F), Min:36.3 °C (97.4 °F), Max:37.1 °C (98.8 °F)  Temperature: 36.7 °C (98.1 °F)  Pulse  Av.4  Min: 53  Max: 73   Blood Pressure: 110/63       Physical Exam:  Physical Exam   Constitutional: He is oriented to person, place, and time. No distress.   HENT:   Head: Normocephalic.   Eyes: No scleral icterus.   Neck: Neck supple.   Cardiovascular: Regular rhythm.    No murmur heard.  Pulmonary/Chest: He has no wheezes. He has no rales.   Abdominal: Soft. There is no tenderness. There is no rebound.   Musculoskeletal:   Right foot is still swollen  Lateral incision is clean  The plantar surface ulcer has callus around it and is bleeding   Neurological: He is alert and oriented to person, place, and time.   Vitals reviewed.      Meds:    Current Facility-Administered Medications:   •  vancomycin  •  MD ALERT... vancomycin  •  [COMPLETED] piperacillin-tazobactam **AND** piperacillin-tazobactam (ZOSYN) continuous infusion  •  naproxen  •  morphine injection  •  aspirin EC  •  atenolol  •   atorvastatin  •  clopidogrel  •  enoxaparin  •  furosemide  •  gabapentin  •  insulin glargine  •  isosorbide dinitrate  •  NIFEdipine SR  •  omeprazole  •  pentoxifylline CR  •  senna-docusate **AND** polyethylene glycol/lytes **AND** magnesium hydroxide **AND** bisacodyl  •  Respiratory Care per Protocol  •  acetaminophen  •  insulin regular **AND** Accu-Chek ACHS **AND** NOTIFY MD and PharmD **AND** glucose 4 g **AND** dextrose 50%  •  ondansetron  •  ondansetron  •  promethazine  •  promethazine  •  prochlorperazine    Labs:  Recent Labs      12/30/17 0214 12/31/17 0220   WBC  11.7*  11.2*   RBC  4.37*  4.12*   HEMOGLOBIN  12.4*  11.5*   HEMATOCRIT  37.0*  35.1*   MCV  84.7  85.2   MCH  28.4  27.9   RDW  44.0  44.7   PLATELETCT  190  167   MPV  9.6  9.6     Recent Labs      12/30/17 0214 12/31/17 0220   SODIUM  136  132*   POTASSIUM  3.8  3.7   CHLORIDE  109  105   CO2  22  21   GLUCOSE  178*  168*   BUN  10  11     Recent Labs      12/30/17 0214 12/31/17 0220   CREATININE  0.85  0.73       Imaging:  Dx-foot-complete 3+ Right    Result Date: 12/28/2017 12/28/2017 1:11 PM HISTORY/REASON FOR EXAM:  Atraumatic Pain/Swelling/Deformity. TECHNIQUE/EXAM DESCRIPTION:  3 views RIGHT foot. COMPARISON:  None. FINDINGS:  There is soft tissue swelling and soft tissue gas adjacent to the distal right fourth metatarsal and right fourth toe. There is destruction of the distal right fourth metatarsal and base of the proximal phalanx. There may be erosive changes involving the margins of the fifth MTP joint. No other destructive lesions no acute fracture identified.     Distal lateral soft tissue swelling and gas consistent with infection. Destruction of the distal fourth metatarsal base of the proximal phalanx consistent with osteomyelitis. Possible osteomyelitis involving the distal fifth metatarsal base of the fifth proximal phalanx.    Le Art Duplx/imag    Result Date: 12/29/2017  Lower Extremity  Arterial  Duplex Report  Vascular Laboratory  CONCLUSIONS  BAILEY FARNSWORTH  Exam Date:     2017 10:50  Room #:     Inpatient  Priority:     Routine  Ht (in):             Wt (lb):  Ordering Physician:        CORBIN MENDENHALL  Referring Physician:       CORBIN MENDENHALL  Sonographer:               Steffi Boyle RVT  Study Type:                Complete Bilateral  Technical Quality:         Adequate  Age:    62    Gender:     M  MRN:    [id number]  :    1955      BSA:  Indications:     Claudication  CPT Codes:       04780  ICD Codes:       I70.213  History:         Unhealing wound on ball of right foot x4 months. History of                   diabetes.  Limitations:                RIGHT  Waveform        Peak Systolic Velocity (cm/s)                  Prox    Prox-Mid  Mid    Mid-Dist  Distal  Triphasic                         139                      CFA  Biphasic        121                                        PFA  Triphasic       164               116              85      SFA  Triphasic                         78                       POP  Bi, non-        155                                139     AT  reversed  Monophasic      19                                 17      PT  Absent          0                                  41      SHANNON                LEFT  Waveform        Peak Systolic Velocity (cm/s)                  Prox    Prox-Mid  Mid    Mid-Dist  Distal  Triphasic                         141                      CFA  Triphasic       120                                        PFA  Triphasic       129               98               70      SFA  Triphasic                         55                       POP  Bi, non-        59                                 78      AT  reversed  Bi, non-        65                                 23      PT  reversed  Absent          75                                 0       SHANNON  FINDINGS  Right.  Diffuse plaque is seen in the common femoral, profunda femoral, femoral,  and  popliteal arteries without evidence of hemodynamically significant  stenosis.  No flow can be demonstrated in the proximal peroneal artery.  Waveforms at the posterior tibial are monophasic with severly reduced  amplitude.  Waveforms at the anterior tibial artery are biphasic.  Left.  Diffuse plaque is seen in the common femoral, profunda femoral, femoral,  and popliteal arteries without evidence of hemodynamically significant  stenosis.  No flow can be demonstrated in the distal peroneal artery.  Waveforms at the posterior tibial and anterior tibial biphasic.     Le Art/katja    Result Date: 2017   Vascular Laboratory  Conclusions  BAILEY FARNSWORTH  Age:    62    Gender:     M  MRN:    [id number]  :    1955      BSA:  Exam Date:     2017 12:43  Room #:     Inpatient  Priority:     Routine  Ht (in):             Wt (lb):  Ordering Physician:        CORBIN MENDENHALL  Referring Physician:       CORBIN MENDENHALL  Sonographer:               Steffi Boyle RVT  Study Type:                Limited Bilateral  Technical Quality:         Adequate  Indications:     Claudication  CPT Codes:       25517  ICD Codes:       I70.213  History:         Unhealing wound on ball of right foot. Diabetes mellitus.  Limitations:     IV in left arm.                 RIGHT  Waveform            Systolic BPs (mmHg)                             112           Brachial  Triphasic                                Common Femoral  Monophasic                 76            Posterior Tibial  Bi, non-                   118           Dorsalis Pedis  reversed                                           Peroneal                             1.05          KATJA                                           TBI                       LEFT  Waveform        Systolic BPs (mmHg)                                           Brachial  Triphasic                                Common Femoral  Bi, non-                   0             Posterior Tibial  reversed  Bi, non-                    100           Dorsalis Pedis  reversed                                           Peroneal                             0.89          TATY                                           TBI  Findings  Right.  Ankle brachial index is 1.05.  Doppler waveform of the common femoral and popliteal arteries are of high  amplitude and triphasic.  Doppler waveforms of the posterior tibial at the ankle are monophasic.  Doppler waveforms at the ankle are brisk and biphasic.  Left.  Ankle brachial index is 0.89 for the dorsalis pedis artery.  Ankle brachial index for the posterior tibial artery is non-compressible.  Doppler waveform of the common femoral artery is of high amplitude and  triphasic.  Doppler waveforms at the ankle are brisk and biphasic.  Arterial duplex scan was performed in accordance with lower extremity  arterial evaluation protocol - see separate report.       Micro:  Results     Procedure Component Value Units Date/Time    CULTURE TISSUE W/ GRM STAIN [[id number]]  (Abnormal)  (Susceptibility) Collected:  12/29/17 7676    Order Status:  Completed Specimen:  Tissue Updated:  01/01/18 1209     Significant Indicator POS (POS)     Source TISS     Site 5th Metatarsal Head     Tissue Culture -- (A)     Growth noted after further incubation,see below for  organism identification.       Gram Stain Result --     Rare WBCs.  No organisms seen.       Tissue Culture -- (A)     Staphylococcus epidermidis  Rare growth       Tissue Culture -- (A)     Diphtheroids  Rare growth      Culture & Susceptibility     STAPHYLOCOCCUS EPIDERMIDIS     Antibiotic Sensitivity Microscan Unit Status    Ampicillin/sulbactam Resistant 16/8 mcg/mL Preliminary    Clindamycin Sensitive <=0.5 mcg/mL Preliminary    Daptomycin Sensitive <=0.5 mcg/mL Preliminary    Erythromycin Resistant >4 mcg/mL Preliminary    Moxifloxacin Resistant >4 mcg/mL Preliminary    Oxacillin Resistant >2 mcg/mL Preliminary    Penicillin Resistant >8 mcg/mL Preliminary  "   Tetracycline Resistant >8 mcg/mL Preliminary    Trimeth/Sulfa Resistant >2/38 mcg/mL Preliminary    Vancomycin Sensitive 2 mcg/mL Preliminary                       ANAEROBIC CULTURE [[id number]] Collected:  12/29/17 1736    Order Status:  Completed Specimen:  Tissue Updated:  01/01/18 1209     Significant Indicator NEG     Source TISS     Site 5th Metatarsal Head     Anaerobic Culture, Culture Res No Anaerobes isolated.    CULTURE TISSUE W/ GRM STAIN [[id number]]  (Abnormal) Collected:  12/29/17 1730    Order Status:  Completed Specimen:  Tissue Updated:  01/01/18 1208     Significant Indicator POS (POS)     Source TISS     Site 4th Metatarsal Head     Tissue Culture -- (A)     Growth noted after further incubation,see below for  organism identification.       Gram Stain Result --     Few WBCs.  No organisms seen.       Tissue Culture -- (A)     Diphtheroids  Light growth       Tissue Culture -- (A)     Coagulase-negative Staphylococcus species  Light growth      ANAEROBIC CULTURE [[id number]] Collected:  12/29/17 1730    Order Status:  Completed Specimen:  Tissue Updated:  01/01/18 1208     Significant Indicator NEG     Source TISS     Site 4th Metatarsal Head     Anaerobic Culture, Culture Res No Anaerobes isolated.    BLOOD CULTURE [[id number]] Collected:  12/29/17 1304    Order Status:  Completed Specimen:  Blood from Peripheral Updated:  12/30/17 0844     Significant Indicator NEG     Source BLD     Site PERIPHERAL     Blood Culture --     No Growth    Note: Blood cultures are incubated for 5 days and  are monitored continuously.Positive blood cultures  are called to the RN and reported as soon as  they are identified.      Narrative:       Per Hospital Policy: Only change Specimen Src: to \"Line\" if  specified by physician order.    BLOOD CULTURE [[id number]] Collected:  12/29/17 1305    Order Status:  Completed Specimen:  Blood from Peripheral Updated:  12/30/17 0844     Significant Indicator NEG     Source BLD " "    Site PERIPHERAL     Blood Culture --     No Growth    Note: Blood cultures are incubated for 5 days and  are monitored continuously.Positive blood cultures  are called to the RN and reported as soon as  they are identified.      Narrative:       Per Hospital Policy: Only change Specimen Src: to \"Line\" if  specified by physician order.    GRAM STAIN [[id number]] Collected:  12/29/17 1736    Order Status:  Completed Specimen:  Tissue Updated:  12/30/17 0748     Significant Indicator .     Source TISS     Site 5th Metatarsal Head     Gram Stain Result --     Rare WBCs.  No organisms seen.      GRAM STAIN [[id number]] Collected:  12/29/17 1730    Order Status:  Completed Specimen:  Tissue Updated:  12/30/17 0748     Significant Indicator .     Source TISS     Site 4th Metatarsal Head     Gram Stain Result --     Few WBCs.  No organisms seen.      CULTURE WOUND W/ GRAM STAIN [[id number]]     Order Status:  No result Specimen:  Wound from Right Foot           Assessment:  Active Hospital Problems    Diagnosis   • *Osteomyelitis of right foot (CMS-HCC) [M86.9]   • Chronic anticoagulation [Z79.01]   • CHF (congestive heart failure) (CMS-HCC) [I50.9]   • COPD (chronic obstructive pulmonary disease) (CMS-HCC) [J44.9]   • Coronary artery disease [I25.10]   • DM (diabetes mellitus) (CMS-HCC) [E11.9]   • Essential hypertension [I10]   • PAOD (peripheral arterial occlusive disease) (CMS-HCC) [I77.9]       Plan:  Diabetic foot ulcer  Underlying osteomyelitis  Status post I&D and right fourth and fifth metatarsal head and proximal phalanx excision on 12/29/17  Cultures are Coagulase negative staph and diphtheroids  Continue to follow  Wound Care  Discontinue Vanco and Zosyn  PICC line  Start daptomycin  Aim  for 6 weeks    Diabetes mellitus  Keep the blood sugars under control for wound healing    Discussed with internal medicine.  "

## 2018-01-01 NOTE — PROGRESS NOTES
Aox4. VS stable. Pain on RLE controlled with morphine 3 mg. Complaining pain on the calf incision than the foot. RLE dressing changed as order. Incision approximated with minimal serosanguinous drainage. Right plantar wound has minimal serosanguinous drainage. RLE on boot. Dressing on RLE calf clean, dry and intact. Voiding. Last BM 12/31 per patient. No other concerns at this time.

## 2018-01-01 NOTE — PROGRESS NOTES
"Pharmacy Kinetics 62 y.o. male on vancomycin day # 4 2018    Currently on Vancomycin 1100 mg mg iv q12hr ()     Indication for Treatment: Right foot diabetic foot ulcer possible oseomyelitis     Pertinent history per medical record: Admitted on 2017 for right foot diabetic foot ulcer.  Poorly controlled DM, hx of multiple blood clots, on warfarin at home.  Hx of PVD, on pentoxifylline.  Has been taking Augmentin at home for the infected foot.  Now S/P 4th and 5th metatarsal head excision, gastroscoleous recession.      Other antibiotics: Piperacillin/Tazobactam 10.125 g CIVI    Allergies: Patient has no known allergies.     List concerns for renal function: BMI ~32 kg/m2, TII DM, CHF    Pertinent cultures to date:   17 - blood (peripheral) x 2: NGTD  17 - tissue (4th metatarsal): CoNS; diphtheroids  17 - tissue (5th metatarsal): CoNS; diphtheroids    Recent Labs      17   0214  17   0220   WBC  11.7*  11.2*     Recent Labs      17   0214  17   0220   BUN  10  11   CREATININE  0.85  0.73     Recent Labs      17   1125   VANCOTROUGH  23.0*     Intake/Output Summary (Last 24 hours) at 18 0825  Last data filed at 18 0000   Gross per 24 hour   Intake             1554 ml   Output              600 ml   Net              954 ml      Blood pressure 103/60, pulse (!) 56, temperature 36.3 °C (97.4 °F), resp. rate 18, height 1.753 m (5' 9\"), weight 98.2 kg (216 lb 7.9 oz), SpO2 91 %. Temp (24hrs), Av.8 °C (98.2 °F), Min:36.3 °C (97.4 °F), Max:37.1 °C (98.8 °F)    A/P   1. Vancomycin dose change: Continue vancomycin 1100 mg mg IV q12hr (~11 mg/kg; due @ )  2. Next vancomycin level: 18 @ 1730  3. Goal trough: 12-16 mcg/mL  4. A/P: Patient afebrile overnight with mild leukocytosis. Vital signs stable. Vanco dose reduced yesterday 2/2 supratherapeutic prior to the 4th total dose. Cultures from the 4th and 5th metatarsal growing diptheroids and " CoNS. Per ID, will check the wound with wound care and then determine the course of antibiotics; may be able to de-escalate soon. FSBS relatively well controlled. Will continue with vancomycin ~11 mg/kg IV Q12h as outlined above and check a trough tomorrow @ 1730. Pharmacy to monitor and adjust as needed.     Julianna Cross, PharmD, BCOP

## 2018-01-01 NOTE — PROGRESS NOTES
[facility] Hospitalist Progress Note    Date of Service: 2018    Chief Complaint  62 y.o. male admitted 2017 with right foot osteomyelitis     Interval Problem Update   pt seen and examined surgical debridement done on 17  for his right foot osteomyelitis. Still with some leg pain, afebrile no overnight events.   Ortho, ID and LPS following  following appreciate rec     Consultants/Specialty  ID  LPS  Ortho     Disposition  TBD      Review of Systems   Constitutional: Negative for chills and fever.   HENT: Negative for hearing loss.    Eyes: Negative for blurred vision.   Cardiovascular: Negative for chest pain and palpitations.   Gastrointestinal: Negative for nausea and vomiting.   Musculoskeletal: Negative for myalgias.        Foot pain     Skin: Negative for rash.   Neurological: Negative for dizziness.      Physical Exam  Laboratory/Imaging   Hemodynamics  Temp (24hrs), Av.8 °C (98.2 °F), Min:36.3 °C (97.4 °F), Max:37.1 °C (98.8 °F)   Temperature: 36.7 °C (98.1 °F)  Pulse  Av.4  Min: 53  Max: 73    Blood Pressure: 110/63      Respiratory      Respiration: 16, Pulse Oximetry: 96 %     Work Of Breathing / Effort: Mild  RUL Breath Sounds: Clear, RML Breath Sounds: Diminished, RLL Breath Sounds: Diminished, NATASHA Breath Sounds: Clear, LLL Breath Sounds: Diminished    Fluids    Intake/Output Summary (Last 24 hours) at 18 0959  Last data filed at 18 0800   Gross per 24 hour   Intake             1954 ml   Output              900 ml   Net             1054 ml       Nutrition  Orders Placed This Encounter   Procedures   • DIET ORDER     Standing Status:   Standing     Number of Occurrences:   1     Order Specific Question:   Diet:     Answer:   Diabetic [3]     Order Specific Question:   Diet:     Answer:   Cardiac [6]     Physical Exam   HENT:   Head: Normocephalic and atraumatic.   Eyes: Conjunctivae are normal. No scleral icterus.   Neck: Neck supple. No JVD present.   Cardiovascular:  Normal rate.    No murmur heard.  Pulmonary/Chest: He has no wheezes. He has no rales.   Abdominal: Soft. Bowel sounds are normal. He exhibits no distension. There is no tenderness.   Musculoskeletal:   ulcer with no purulent discharge at the base of the right 5th right toe   Nursing note and vitals reviewed.      Recent Labs      12/30/17 0214 12/31/17   0220   WBC  11.7*  11.2*   RBC  4.37*  4.12*   HEMOGLOBIN  12.4*  11.5*   HEMATOCRIT  37.0*  35.1*   MCV  84.7  85.2   MCH  28.4  27.9   MCHC  33.5*  32.8*   RDW  44.0  44.7   PLATELETCT  190  167   MPV  9.6  9.6     Recent Labs      12/30/17 0214 12/31/17 0220   SODIUM  136  132*   POTASSIUM  3.8  3.7   CHLORIDE  109  105   CO2  22  21   GLUCOSE  178*  168*   BUN  10  11   CREATININE  0.85  0.73   CALCIUM  8.3*  8.1*                      Assessment/Plan     * Osteomyelitis of right foot (CMS-HCC)- (present on admission)   Assessment & Plan    Xray foot showing osteomyelitis  Had surgery on 12/31/17    Ortho, ID and LPS following appreciate rec.   cont IV abx as per ID rec.            Chronic anticoagulation- (present on admission)   Assessment & Plan    -Check INR, hold outpatient Coumadin given possible surgical intervention and continue outpatient weight-based Lovenox        CHF (congestive heart failure) (CMS-HCC)- (present on admission)   Assessment & Plan    -Pt has chronic CHF   Currently well compensated continue outpatient medications no exacerbation         DM (diabetes mellitus) (CMS-HCC)- (present on admission)   Assessment & Plan    -A1C 9.7   -Continue  Lantus along with SSI          COPD (chronic obstructive pulmonary disease) (CMS-HCC)- (present on admission)   Assessment & Plan    -At his baseline with no current exacerbation, do respiratory therapy protocol and continue outpatient bronchodilators as needed        Coronary artery disease- (present on admission)   Assessment & Plan    -No current chest pain continue outpatient cardiac  medications        Essential hypertension- (present on admission)   Assessment & Plan    -Well-controlled   -continue outpatient blood pressure medication        PAOD (peripheral arterial occlusive disease) (CMS-HCC)- (present on admission)   Assessment & Plan    -Continue outpatient medications and monitor closely, arterial studies outlined above            Reviewed items::  Labs reviewed, Radiology images reviewed and Medications reviewed  Dorsey catheter::  No Dorsey  DVT prophylaxis pharmacological::  Enoxaparin (Lovenox)  Antibiotics:  Treating active infection/contamination beyond 24 hours perioperative coverage

## 2018-01-01 NOTE — PROGRESS NOTES
"LIMB PRESERVATION SERVICE     Patient is a 62 y.o. male with history of Diabetes, Congestive heart failure, Chronic obstructive pulmonary disease, CAD (coronary artery disease), and s/p thrombectomy to LLE in 2016.    Admitted for R foot ulcer with osteomyelitis.    POD # 3 s/p R foot I &D, R 4th and 5th proximal phalanx partial excisions and Met head excisions, R GSR by Dr. Villegas.      /63   Pulse (!) 53   Temp 36.7 °C (98.1 °F)   Resp 16   Ht 1.753 m (5' 9\")   Wt 98.2 kg (216 lb 7.9 oz)   SpO2 96%   BMI 31.97 kg/m²    Pain controlled  Wearing herman boot continuously. Reviewed importance to keep wearing boot      DM:  a1c 9.7%  DME has seen pt  Glucose this am 180      Arterial studies prelim results:  Diffuse plaque is seen in the common femoral, profunda femoral, femoral,    and popliteal arteries without evidence of hemodynamically significant    stenosis.   No flow can be demonstrated in the proximal peroneal artery.    Waveforms at the posterior tibial are monophasic with severly reduced    amplitude.    Waveforms at the anterior tibial artery are biphasic.      Left.    Diffuse plaque is seen in the common femoral, profunda femoral, femoral,    and popliteal arteries without evidence of hemodynamically significant    stenosis.   No flow can be demonstrated in the distal peroneal artery.    Waveforms at the posterior tibial and anterior tibial biphasic    Has seen Dr. Botello for LLE thrombectomy  Contacted Dr. Botello re evaluation of blood flow for wound healing. Based off of NIV, should be able to heal         R foot:   Changed by nursing. RN stated she Did not see purulent drainage.         PLAN  Dressing changes daily for nursing. Orders placed  Pt to wear [place] boot to RLE at all times  HWB RLE with boot on when OOB  IV abx per ID          D/C plan: SNF?. Pt lives in [place]. Pt to f/u at LPS rounds at wound care clinic for post op check on   1/19/18              "

## 2018-01-02 ENCOUNTER — APPOINTMENT (OUTPATIENT)
Dept: RADIOLOGY | Facility: MEDICAL CENTER | Age: 63
DRG: 982 | End: 2018-01-02
Attending: INTERNAL MEDICINE
Payer: MEDICARE

## 2018-01-02 PROBLEM — D68.318 ACQUIRED CIRCULATING ANTICOAGULANTS (HCC): Status: ACTIVE | Noted: 2017-12-28

## 2018-01-02 LAB
ANION GAP SERPL CALC-SCNC: 5 MMOL/L (ref 0–11.9)
BACTERIA TISS AEROBE CULT: ABNORMAL
BUN SERPL-MCNC: 12 MG/DL (ref 8–22)
CALCIUM SERPL-MCNC: 8.2 MG/DL (ref 8.5–10.5)
CHLORIDE SERPL-SCNC: 105 MMOL/L (ref 96–112)
CK SERPL-CCNC: 26 U/L (ref 0–154)
CO2 SERPL-SCNC: 24 MMOL/L (ref 20–33)
CREAT SERPL-MCNC: 0.74 MG/DL (ref 0.5–1.4)
ERYTHROCYTE [DISTWIDTH] IN BLOOD BY AUTOMATED COUNT: 45.8 FL (ref 35.9–50)
GFR SERPL CREATININE-BSD FRML MDRD: >60 ML/MIN/1.73 M 2
GLUCOSE BLD-MCNC: 159 MG/DL (ref 65–99)
GLUCOSE BLD-MCNC: 178 MG/DL (ref 65–99)
GLUCOSE BLD-MCNC: 198 MG/DL (ref 65–99)
GLUCOSE BLD-MCNC: 249 MG/DL (ref 65–99)
GLUCOSE SERPL-MCNC: 175 MG/DL (ref 65–99)
GRAM STN SPEC: ABNORMAL
GRAM STN SPEC: ABNORMAL
HCT VFR BLD AUTO: 32.7 % (ref 42–52)
HGB BLD-MCNC: 10.6 G/DL (ref 14–18)
MCH RBC QN AUTO: 27.9 PG (ref 27–33)
MCHC RBC AUTO-ENTMCNC: 32.4 G/DL (ref 33.7–35.3)
MCV RBC AUTO: 86.1 FL (ref 81.4–97.8)
PLATELET # BLD AUTO: 174 K/UL (ref 164–446)
PMV BLD AUTO: 9.7 FL (ref 9–12.9)
POTASSIUM SERPL-SCNC: 3.7 MMOL/L (ref 3.6–5.5)
RBC # BLD AUTO: 3.8 M/UL (ref 4.7–6.1)
SIGNIFICANT IND 70042: ABNORMAL
SIGNIFICANT IND 70042: ABNORMAL
SITE SITE: ABNORMAL
SITE SITE: ABNORMAL
SODIUM SERPL-SCNC: 134 MMOL/L (ref 135–145)
SOURCE SOURCE: ABNORMAL
SOURCE SOURCE: ABNORMAL
WBC # BLD AUTO: 8.7 K/UL (ref 4.8–10.8)

## 2018-01-02 PROCEDURE — 97530 THERAPEUTIC ACTIVITIES: CPT

## 2018-01-02 PROCEDURE — 700111 HCHG RX REV CODE 636 W/ 250 OVERRIDE (IP): Mod: JG | Performed by: INTERNAL MEDICINE

## 2018-01-02 PROCEDURE — 80048 BASIC METABOLIC PNL TOTAL CA: CPT

## 2018-01-02 PROCEDURE — 82962 GLUCOSE BLOOD TEST: CPT | Mod: 91

## 2018-01-02 PROCEDURE — 700111 HCHG RX REV CODE 636 W/ 250 OVERRIDE (IP): Performed by: INTERNAL MEDICINE

## 2018-01-02 PROCEDURE — 97116 GAIT TRAINING THERAPY: CPT

## 2018-01-02 PROCEDURE — 700102 HCHG RX REV CODE 250 W/ 637 OVERRIDE(OP): Performed by: INTERNAL MEDICINE

## 2018-01-02 PROCEDURE — A9270 NON-COVERED ITEM OR SERVICE: HCPCS | Performed by: INTERNAL MEDICINE

## 2018-01-02 PROCEDURE — 99233 SBSQ HOSP IP/OBS HIGH 50: CPT | Performed by: INTERNAL MEDICINE

## 2018-01-02 PROCEDURE — 700111 HCHG RX REV CODE 636 W/ 250 OVERRIDE (IP): Performed by: STUDENT IN AN ORGANIZED HEALTH CARE EDUCATION/TRAINING PROGRAM

## 2018-01-02 PROCEDURE — 36415 COLL VENOUS BLD VENIPUNCTURE: CPT

## 2018-01-02 PROCEDURE — 85027 COMPLETE CBC AUTOMATED: CPT

## 2018-01-02 PROCEDURE — 82550 ASSAY OF CK (CPK): CPT

## 2018-01-02 PROCEDURE — 36569 INSJ PICC 5 YR+ W/O IMAGING: CPT

## 2018-01-02 PROCEDURE — 770006 HCHG ROOM/CARE - MED/SURG/GYN SEMI*

## 2018-01-02 RX ORDER — NAPROXEN 500 MG/1
500 TABLET ORAL 2 TIMES DAILY PRN
Status: DISCONTINUED | OUTPATIENT
Start: 2018-01-02 | End: 2018-01-05

## 2018-01-02 RX ORDER — OXYCODONE HYDROCHLORIDE 10 MG/1
10-20 TABLET ORAL
Status: DISCONTINUED | OUTPATIENT
Start: 2018-01-02 | End: 2018-01-02

## 2018-01-02 RX ORDER — OXYCODONE HYDROCHLORIDE 10 MG/1
10 TABLET ORAL
Status: DISCONTINUED | OUTPATIENT
Start: 2018-01-02 | End: 2018-01-09 | Stop reason: HOSPADM

## 2018-01-02 RX ORDER — INSULIN GLARGINE 100 [IU]/ML
26 INJECTION, SOLUTION SUBCUTANEOUS EVERY EVENING
Status: DISCONTINUED | OUTPATIENT
Start: 2018-01-02 | End: 2018-01-03

## 2018-01-02 RX ORDER — OXYCODONE HYDROCHLORIDE 10 MG/1
20 TABLET ORAL
Status: DISCONTINUED | OUTPATIENT
Start: 2018-01-02 | End: 2018-01-09 | Stop reason: HOSPADM

## 2018-01-02 RX ADMIN — INSULIN HUMAN 2 UNITS: 100 INJECTION, SOLUTION PARENTERAL at 20:12

## 2018-01-02 RX ADMIN — PENTOXIFYLLINE 400 MG: 400 TABLET, FILM COATED, EXTENDED RELEASE ORAL at 16:55

## 2018-01-02 RX ADMIN — INSULIN HUMAN 1 UNITS: 100 INJECTION, SOLUTION PARENTERAL at 05:44

## 2018-01-02 RX ADMIN — INSULIN GLARGINE 26 UNITS: 100 INJECTION, SOLUTION SUBCUTANEOUS at 20:14

## 2018-01-02 RX ADMIN — GABAPENTIN 100 MG: 100 CAPSULE ORAL at 14:34

## 2018-01-02 RX ADMIN — OXYCODONE HYDROCHLORIDE 20 MG: 10 TABLET ORAL at 20:14

## 2018-01-02 RX ADMIN — FUROSEMIDE 10 MG: 20 TABLET ORAL at 07:42

## 2018-01-02 RX ADMIN — OMEPRAZOLE 20 MG: 20 CAPSULE, DELAYED RELEASE ORAL at 07:42

## 2018-01-02 RX ADMIN — INSULIN HUMAN 1 UNITS: 100 INJECTION, SOLUTION PARENTERAL at 10:44

## 2018-01-02 RX ADMIN — ATORVASTATIN CALCIUM 20 MG: 20 TABLET, FILM COATED ORAL at 20:13

## 2018-01-02 RX ADMIN — PENTOXIFYLLINE 400 MG: 400 TABLET, FILM COATED, EXTENDED RELEASE ORAL at 07:42

## 2018-01-02 RX ADMIN — CLOPIDOGREL 75 MG: 75 TABLET, FILM COATED ORAL at 20:13

## 2018-01-02 RX ADMIN — MORPHINE SULFATE 3 MG: 4 INJECTION INTRAVENOUS at 00:32

## 2018-01-02 RX ADMIN — GABAPENTIN 100 MG: 100 CAPSULE ORAL at 07:42

## 2018-01-02 RX ADMIN — PENTOXIFYLLINE 400 MG: 400 TABLET, FILM COATED, EXTENDED RELEASE ORAL at 11:00

## 2018-01-02 RX ADMIN — ASPIRIN 81 MG: 81 TABLET, COATED ORAL at 07:42

## 2018-01-02 RX ADMIN — DAPTOMYCIN 600 MG: 500 INJECTION, POWDER, LYOPHILIZED, FOR SOLUTION INTRAVENOUS at 13:49

## 2018-01-02 RX ADMIN — GABAPENTIN 100 MG: 100 CAPSULE ORAL at 20:14

## 2018-01-02 RX ADMIN — MORPHINE SULFATE 3 MG: 4 INJECTION INTRAVENOUS at 06:32

## 2018-01-02 RX ADMIN — NIFEDIPINE 30 MG: 30 TABLET, FILM COATED, EXTENDED RELEASE ORAL at 07:41

## 2018-01-02 RX ADMIN — ATENOLOL 50 MG: 50 TABLET ORAL at 20:13

## 2018-01-02 RX ADMIN — ENOXAPARIN SODIUM 100 MG: 100 INJECTION SUBCUTANEOUS at 20:15

## 2018-01-02 RX ADMIN — MORPHINE SULFATE 3 MG: 4 INJECTION INTRAVENOUS at 09:37

## 2018-01-02 RX ADMIN — INSULIN HUMAN 1 UNITS: 100 INJECTION, SOLUTION PARENTERAL at 16:18

## 2018-01-02 RX ADMIN — NAPROXEN 500 MG: 500 TABLET ORAL at 16:19

## 2018-01-02 RX ADMIN — ENOXAPARIN SODIUM 100 MG: 100 INJECTION SUBCUTANEOUS at 07:41

## 2018-01-02 RX ADMIN — STANDARDIZED SENNA CONCENTRATE AND DOCUSATE SODIUM 2 TABLET: 8.6; 5 TABLET, FILM COATED ORAL at 07:41

## 2018-01-02 RX ADMIN — ISOSORBIDE DINITRATE 20 MG: 10 TABLET ORAL at 07:42

## 2018-01-02 RX ADMIN — MORPHINE SULFATE 2 MG: 4 INJECTION INTRAVENOUS at 03:36

## 2018-01-02 ASSESSMENT — COGNITIVE AND FUNCTIONAL STATUS - GENERAL
MOBILITY SCORE: 20
CLIMB 3 TO 5 STEPS WITH RAILING: A LITTLE
MOVING FROM LYING ON BACK TO SITTING ON SIDE OF FLAT BED: A LITTLE
SUGGESTED CMS G CODE MODIFIER MOBILITY: CJ
WALKING IN HOSPITAL ROOM: A LITTLE
STANDING UP FROM CHAIR USING ARMS: A LITTLE

## 2018-01-02 ASSESSMENT — PAIN SCALES - GENERAL
PAINLEVEL_OUTOF10: 2
PAINLEVEL_OUTOF10: 4
PAINLEVEL_OUTOF10: 6
PAINLEVEL_OUTOF10: 6
PAINLEVEL_OUTOF10: 9
PAINLEVEL_OUTOF10: 7
PAINLEVEL_OUTOF10: 10
PAINLEVEL_OUTOF10: 8

## 2018-01-02 ASSESSMENT — ENCOUNTER SYMPTOMS
VOMITING: 0
COUGH: 0
SHORTNESS OF BREATH: 0
FEVER: 0
NAUSEA: 0
MYALGIAS: 1
ABDOMINAL PAIN: 0
FOCAL WEAKNESS: 0

## 2018-01-02 ASSESSMENT — GAIT ASSESSMENTS
DEVIATION: STEP TO;BRADYKINETIC
GAIT LEVEL OF ASSIST: CONTACT GUARD ASSIST
DISTANCE (FEET): 150
ASSISTIVE DEVICE: FRONT WHEEL WALKER

## 2018-01-02 NOTE — THERAPY
"Physical Therapy Treatment completed.   Bed Mobility:  Supine to Sit: Modified Independent  Transfers: Sit to Stand: Stand by Assist  Gait: Level Of Assist: Contact Guard Assist with Front-Wheel Walker       Plan of Care: Will benefit from Physical Therapy 4 times per week  Discharge Recommendations: Equipment: Will Continue to Assess for Equipment Needs.    See \"Rehab Therapy-Acute\" Patient Summary Report for complete documentation.     Pt presenting w/ improved functional mobility. Pt has improved standing balance which helps pt w/ ambulation w/ CAM boot on. Pt did have one LOB in which he was able to self correct and realize what caused the LOB. Pt did require cues for not advancing FWW so far in front of him and to slow down. Pt is improving functionally but still shows poor insight into safety awareness.  "

## 2018-01-02 NOTE — CARE PLAN
Problem: Safety  Goal: Will remain free from injury    Intervention: Provide assistance with mobility  Call light within reach, treaded socks in place, bed in lowest position and locked.  Hourly rounding in progress.       Problem: Pain Management  Goal: Pain level will decrease to patient's comfort goal    Intervention: Follow pain managment plan developed in collaboration with patient and Interdisciplinary Team  Call light within reach, treaded socks in place, bed in lowest position and locked.  Hourly rounding in progress.

## 2018-01-02 NOTE — DISCHARGE PLANNING
CCS received SNF choice form. Per the choice form the referral has been sent to [facility] [place] [facility] [place]. SW on floor has been notified the a SNF  Order is needed for this patient.

## 2018-01-02 NOTE — DISCHARGE PLANNING
ADRIANA spoke to JAYNE Ward #[phone], she is the pt's APRN.  She stated that the [facility] does not do out patient infusions and the closest hospital would be in [place].      ADRIANA spoke to pt, he deferred decision making to his spouse, Janette #[phone].  ADRIANA LM for Janette and is waiting for a call back.     Pt's insurance will not cover the cost of the medications and will need to go to a SNF.     UPDATE:    ADRIANA spoke to Janette, she would like a referral sent to [facility] and [facility].  ADRIANA faxed choice to [facility].

## 2018-01-02 NOTE — PROGRESS NOTES
Assumed pt care at 0715.  Received report from edgar RN.  Assessment completed.  Pt AAOx4.  Pt has no comlaints of pain at this time.  No other s/s of discomfort or distress. Pt ambulates with FWW and 1 assist.  Boot in place.  Bed in lowest position, locked, and bed alarm is on.  Pt calls appropriately.  Treaded socks in place, call light within reach and staff numbers provided.  Pt needs met at this time.

## 2018-01-02 NOTE — CARE PLAN
Problem: Communication  Goal: The ability to communicate needs accurately and effectively will improve  Outcome: PROGRESSING AS EXPECTED  Patient uses call light appropriately to communicate needs and concerns with staff    Problem: Safety  Goal: Will remain free from injury  Outcome: PROGRESSING AS EXPECTED  Patient has remained free from further injury during this shift    Problem: Pain Management  Goal: Pain level will decrease to patient's comfort goal  Outcome: PROGRESSING AS EXPECTED  Patient's pain maintained at tolerable levels this shift, permitting sleep and movement this shift    Problem: Mobility  Goal: Risk for activity intolerance will decrease  Outcome: PROGRESSING AS EXPECTED  Patient has sat at the edge of bed multiple times times this shift

## 2018-01-02 NOTE — DISCHARGE PLANNING
Per ID's note, pt has osteo and will require 6 weeks of IV ABX.  Pt is currently on dapto.      Pt resides in [place], [place] and has not payer source listed.      Possible BEAR at a SNF is required for pt's long term IV ABX.     UPDATE:    ADRIANA contacted [employer], pt has Medicare part A and B.

## 2018-01-02 NOTE — PROGRESS NOTES
[facility] Hospitalist Progress Note    Date of Service: 2018    Chief Complaint  62 y.o. male with CAD, COPD, CHF, T2DM, admitted 2017 with diabetic right foot ulcer and osteomyelitis. S/p surgical debridement, and excision of 4th, 5th MT head, 4th and 5th proximal phalanx (partial). Cultures grew CoNS and diphtheroids. On antibiotics, changed to daptomycin x 6 weeks by ID.     Interval Problem Update  2018 - no overnight events. Remains hemodynamically stable and afebrile. Stable on RA.  No leukocytosis. Na 132. K, Crea WNL. .     > Seen and examined. (+) pain on the right foot. Denied any CP, SOB, nausea, vomiting, abd pain. Asking for IV morphine consistently. Pending PICC placement.        Consultants/Specialty  ID  LPS  Ortho     Disposition  Needs prolonged IV antibiotics - outpatient/home infusion vs SNF. Pt prefers to go home.       Review of Systems     Pertinent positives/negatives as mentioned above.     A complete review of systems was done. All other systems were negative.      Physical Exam  Laboratory/Imaging   Hemodynamics  Temp (24hrs), Av.8 °C (98.2 °F), Min:36.7 °C (98 °F), Max:37 °C (98.6 °F)   Temperature: 36.7 °C (98 °F)  Pulse  Av  Min: 52  Max: 73    Blood Pressure: 115/61      Respiratory      Respiration: 16, Pulse Oximetry: 91 %        RUL Breath Sounds: Clear, RML Breath Sounds: Clear, RLL Breath Sounds: Diminished, NATASHA Breath Sounds: Clear, LLL Breath Sounds: Diminished    Fluids    Intake/Output Summary (Last 24 hours) at 18 0716  Last data filed at 18 0400   Gross per 24 hour   Intake              400 ml   Output             1325 ml   Net             -925 ml       Nutrition  Orders Placed This Encounter   Procedures   • DIET ORDER     Standing Status:   Standing     Number of Occurrences:   1     Order Specific Question:   Diet:     Answer:   Diabetic [3]     Order Specific Question:   Diet:     Answer:   Cardiac [6]     Physical Exam    Constitutional: He is oriented to person, place, and time. He appears well-developed and well-nourished. No distress.   HENT:   Head: Normocephalic and atraumatic.   Mouth/Throat: Oropharynx is clear and moist. No oropharyngeal exudate.   Eyes: EOM are normal. Pupils are equal, round, and reactive to light. Right eye exhibits no discharge. Left eye exhibits no discharge. No scleral icterus.   Neck: Normal range of motion. Neck supple. No thyromegaly present.   Cardiovascular: Normal rate and regular rhythm.  Exam reveals no gallop and no friction rub.    No murmur heard.  Pulmonary/Chest: Effort normal and breath sounds normal. He has no wheezes. He has no rales. He exhibits no tenderness.   Abdominal: Soft. Bowel sounds are normal. There is no tenderness. There is no rebound and no guarding.   Musculoskeletal: Normal range of motion. He exhibits tenderness. He exhibits no edema.   right leg in boot.  (+) amputated toes on right foot   Lymphadenopathy:     He has no cervical adenopathy.   Neurological: He is alert and oriented to person, place, and time. No cranial nerve deficit. Coordination normal.   Skin: Skin is warm and dry. No rash noted. He is not diaphoretic. No erythema.   Psychiatric: He has a normal mood and affect. His behavior is normal. Thought content normal.   Vitals reviewed.      Recent Labs      12/31/17 0220 01/02/18   0244   WBC  11.2*  8.7   RBC  4.12*  3.80*   HEMOGLOBIN  11.5*  10.6*   HEMATOCRIT  35.1*  32.7*   MCV  85.2  86.1   MCH  27.9  27.9   MCHC  32.8*  32.4*   RDW  44.7  45.8   PLATELETCT  167  174   MPV  9.6  9.7     Recent Labs      12/31/17 0220  01/02/18   0245   SODIUM  132*  134*   POTASSIUM  3.7  3.7   CHLORIDE  105  105   CO2  21  24   GLUCOSE  168*  175*   BUN  11  12   CREATININE  0.73  0.74   CALCIUM  8.1*  8.2*                      Assessment/Plan     * Osteomyelitis of right foot (CMS-HCC)- (present on admission)   Assessment & Plan    - S/p toe amputations.   -  will need prolonged IV antibiotics per ID. Continue daptomycin. Pending PICC placement. SW determining best disposition, but patient wants to go home.   - LPS following.   - Transition to PO pain meds, start PRN naproxen, and PO oxycodone. D/C IV morphine as may have component of IV narcotic seeking behavior.           Essential hypertension- (present on admission)   Assessment & Plan    - Well-controlled.  -continue outpatient blood pressure medication (nifedipine, isordil, atenolol, lasix).        Acquired circulating anticoagulants (CMS-HCC)- (present on admission)   Assessment & Plan    - patient was on outpatient coumadin, but was also on outpatient weight-based Lovenox. Initial INR subtherapeutic, unclear why he's on anticoagulation.   - continue lovenox for now.         Chronic CHF (CMS-HCC)- (present on admission)   Assessment & Plan    - compensated.   - continue home dose PO lasix, antiplatelets, statin, beta blocker, nitrates.         DM (diabetes mellitus) (CMS-HCC)- (present on admission)   Assessment & Plan    - poor control.  - increase lantus to 26 units, goal BG <150 for better wound healing. Continue SSI. Accuchecks AC and HS. Hold OHA for now while in-house.           COPD (chronic obstructive pulmonary disease) (CMS-HCC)- (present on admission)   Assessment & Plan    - not in acute exacerbation.   - continue RT protocol, PRN O2.         Coronary artery disease- (present on admission)   Assessment & Plan    - stable. No ACS.   - continue ASA, plavix, statin, isordil,  Beta blocker.         PAOD (peripheral arterial occlusive disease) (CMS-HCC)- (present on admission)   Assessment & Plan    - Continue DAPT with ASA, and plavix, and statin.             Reviewed items::  Labs reviewed, Radiology images reviewed and Medications reviewed  Dorsey catheter::  No Dorsey  DVT prophylaxis pharmacological::  Enoxaparin (Lovenox)  Antibiotics:  Treating active infection/contamination beyond 24 hours  perioperative coverage

## 2018-01-02 NOTE — PROGRESS NOTES
"LIMB PRESERVATION SERVICE     Patient is a 62 y.o. male with history of Diabetes, Congestive heart failure, Chronic obstructive pulmonary disease, CAD (coronary artery disease), and s/p thrombectomy to LLE in 2016.    Admitted for R foot ulcer with osteomyelitis. Prefers to be referred as female, goes by Trudy.    POD # 4 s/p R foot I &D, R 4th and 5th proximal phalanx partial excisions and Met head excisions, R GSR by Dr. Villegas.      /68   Pulse (!) 55   Temp 36.9 °C (98.5 °F)   Resp 18   Ht 1.753 m (5' 9\")   Wt 98.2 kg (216 lb 7.9 oz)   SpO2 92%   BMI 31.97 kg/m²    Pain controlled. Tender to R calf. Additionally, pt c/o neuropathic pains to RLE  Wearing ariel boot continuously. Reviewed importance to keep wearing boot      DM:  a1c 9.7%  DME has seen pt  Glucose 159      R foot:   R plantar 4th met ulcer: serosang drainage moderate amount. periwound callus with maceration present.     R dorsal 4th webspace incision:   Sutures well approximated, drainage to distal aspect of incision. Continue to monitor area for dehiscence.   Edema and erythema remains extending proximally. 4th toe remains edematous     R calf:   Incision approximated with sutures without induration.   Pt c/o tenderness with palpation to site    Wound and incisions cleaned with NS, applied adhesive foam to calf incision. Filled plantar wound with aquacel ag and covered with non adhesive foam piece. Applied aquacel ag to dorsal incision weaving in between 4th and 5th toes.   Covered foot with kerlix, followed by ace wrap  Replaced ariel boot        PLAN  Dressing changes daily for nursing for RLE. Orders placed  Pt to wear [place] boot to RLE at all times  HWB RLE with boot on when OOB  IV abx per ID-PICC line pending          D/C plan:  Pt lives in [place]. Believes she is returning home. Will need IV abx and wound care. Pt to f/u at LPS rounds at wound care clinic for post op check on   1/19/18    D/W: pt, RN, Dr. Vicente, Dr. Mcconnell "

## 2018-01-02 NOTE — PROGRESS NOTES
"Pt was offered PO pain medication instead of IV morphine per MD order.  Pt is refusing oral pain meds stating, \"If I can't have the IV stuff then I don't want anything.\"  Will continue to monitor pain level.  "

## 2018-01-03 LAB
BACTERIA BLD CULT: NORMAL
BACTERIA BLD CULT: NORMAL
GLUCOSE BLD-MCNC: 162 MG/DL (ref 65–99)
GLUCOSE BLD-MCNC: 163 MG/DL (ref 65–99)
GLUCOSE BLD-MCNC: 209 MG/DL (ref 65–99)
GLUCOSE BLD-MCNC: 242 MG/DL (ref 65–99)
SIGNIFICANT IND 70042: NORMAL
SIGNIFICANT IND 70042: NORMAL
SITE SITE: NORMAL
SITE SITE: NORMAL
SOURCE SOURCE: NORMAL
SOURCE SOURCE: NORMAL

## 2018-01-03 PROCEDURE — A9270 NON-COVERED ITEM OR SERVICE: HCPCS | Performed by: INTERNAL MEDICINE

## 2018-01-03 PROCEDURE — 700102 HCHG RX REV CODE 250 W/ 637 OVERRIDE(OP): Performed by: INTERNAL MEDICINE

## 2018-01-03 PROCEDURE — 700111 HCHG RX REV CODE 636 W/ 250 OVERRIDE (IP): Mod: JG | Performed by: INTERNAL MEDICINE

## 2018-01-03 PROCEDURE — 770006 HCHG ROOM/CARE - MED/SURG/GYN SEMI*

## 2018-01-03 PROCEDURE — 82962 GLUCOSE BLOOD TEST: CPT

## 2018-01-03 PROCEDURE — 99232 SBSQ HOSP IP/OBS MODERATE 35: CPT | Performed by: INTERNAL MEDICINE

## 2018-01-03 PROCEDURE — 700111 HCHG RX REV CODE 636 W/ 250 OVERRIDE (IP): Performed by: INTERNAL MEDICINE

## 2018-01-03 RX ORDER — INSULIN GLARGINE 100 [IU]/ML
28 INJECTION, SOLUTION SUBCUTANEOUS EVERY EVENING
Status: DISCONTINUED | OUTPATIENT
Start: 2018-01-03 | End: 2018-01-05

## 2018-01-03 RX ADMIN — INSULIN GLARGINE 28 UNITS: 100 INJECTION, SOLUTION SUBCUTANEOUS at 20:32

## 2018-01-03 RX ADMIN — GABAPENTIN 100 MG: 100 CAPSULE ORAL at 15:33

## 2018-01-03 RX ADMIN — OXYCODONE HYDROCHLORIDE 10 MG: 10 TABLET ORAL at 05:40

## 2018-01-03 RX ADMIN — ISOSORBIDE DINITRATE 20 MG: 10 TABLET ORAL at 07:52

## 2018-01-03 RX ADMIN — ATORVASTATIN CALCIUM 20 MG: 20 TABLET, FILM COATED ORAL at 20:32

## 2018-01-03 RX ADMIN — NIFEDIPINE 30 MG: 30 TABLET, FILM COATED, EXTENDED RELEASE ORAL at 07:52

## 2018-01-03 RX ADMIN — ASPIRIN 81 MG: 81 TABLET, COATED ORAL at 07:52

## 2018-01-03 RX ADMIN — ENOXAPARIN SODIUM 100 MG: 100 INJECTION SUBCUTANEOUS at 07:51

## 2018-01-03 RX ADMIN — NAPROXEN 500 MG: 500 TABLET ORAL at 18:49

## 2018-01-03 RX ADMIN — OXYCODONE HYDROCHLORIDE 20 MG: 10 TABLET ORAL at 20:52

## 2018-01-03 RX ADMIN — INSULIN HUMAN 2 UNITS: 100 INJECTION, SOLUTION PARENTERAL at 11:34

## 2018-01-03 RX ADMIN — ATENOLOL 50 MG: 50 TABLET ORAL at 15:33

## 2018-01-03 RX ADMIN — CLOPIDOGREL 75 MG: 75 TABLET, FILM COATED ORAL at 20:32

## 2018-01-03 RX ADMIN — INSULIN HUMAN 1 UNITS: 100 INJECTION, SOLUTION PARENTERAL at 17:00

## 2018-01-03 RX ADMIN — GABAPENTIN 100 MG: 100 CAPSULE ORAL at 20:32

## 2018-01-03 RX ADMIN — ENOXAPARIN SODIUM 100 MG: 100 INJECTION SUBCUTANEOUS at 20:32

## 2018-01-03 RX ADMIN — ATENOLOL 50 MG: 50 TABLET ORAL at 20:32

## 2018-01-03 RX ADMIN — PENTOXIFYLLINE 400 MG: 400 TABLET, FILM COATED, EXTENDED RELEASE ORAL at 07:52

## 2018-01-03 RX ADMIN — OMEPRAZOLE 20 MG: 20 CAPSULE, DELAYED RELEASE ORAL at 07:52

## 2018-01-03 RX ADMIN — ATENOLOL 50 MG: 50 TABLET ORAL at 07:52

## 2018-01-03 RX ADMIN — GABAPENTIN 100 MG: 100 CAPSULE ORAL at 07:52

## 2018-01-03 RX ADMIN — INSULIN HUMAN 2 UNITS: 100 INJECTION, SOLUTION PARENTERAL at 20:33

## 2018-01-03 RX ADMIN — PENTOXIFYLLINE 400 MG: 400 TABLET, FILM COATED, EXTENDED RELEASE ORAL at 13:17

## 2018-01-03 RX ADMIN — FUROSEMIDE 10 MG: 20 TABLET ORAL at 09:00

## 2018-01-03 RX ADMIN — DAPTOMYCIN 600 MG: 500 INJECTION, POWDER, LYOPHILIZED, FOR SOLUTION INTRAVENOUS at 13:38

## 2018-01-03 RX ADMIN — PENTOXIFYLLINE 400 MG: 400 TABLET, FILM COATED, EXTENDED RELEASE ORAL at 17:21

## 2018-01-03 RX ADMIN — INSULIN HUMAN 1 UNITS: 100 INJECTION, SOLUTION PARENTERAL at 05:40

## 2018-01-03 ASSESSMENT — PAIN SCALES - GENERAL
PAINLEVEL_OUTOF10: 3
PAINLEVEL_OUTOF10: 5
PAINLEVEL_OUTOF10: 5
PAINLEVEL_OUTOF10: 8
PAINLEVEL_OUTOF10: 8

## 2018-01-03 ASSESSMENT — ENCOUNTER SYMPTOMS
SHORTNESS OF BREATH: 0
FOCAL WEAKNESS: 0
MYALGIAS: 1
COUGH: 0
VOMITING: 0
NAUSEA: 0
ABDOMINAL PAIN: 0
FEVER: 0

## 2018-01-03 NOTE — DISCHARGE PLANNING
CCS spoke to Sivan at [facility]. She stated that the RN staff is reviewing the referral. She will call when finished.  CCS spoke to Addie Real at [facility]. She has not yet reviewed the referral, but will start it now. She stated she would call back within the hour.

## 2018-01-03 NOTE — CARE PLAN
Problem: Communication  Goal: The ability to communicate needs accurately and effectively will improve  Outcome: PROGRESSING AS EXPECTED  Patient uses call appropriately to communicate needs and concerns with staff    Problem: Safety  Goal: Will remain free from injury  Outcome: PROGRESSING AS EXPECTED  Patient has remained free from further injury this shift    Problem: Pain Management  Goal: Pain level will decrease to patient's comfort goal  Outcome: PROGRESSING AS EXPECTED  Patient has been able to sleep this shift with current levels of pain

## 2018-01-03 NOTE — PROGRESS NOTES
Infectious Disease Progress Note    Author: Marianna Vicente M.D. Date & Time of service: 2018  4:35 PM    Chief Complaint:  Diabetic foot infection    Interval History:  -MAXIMUM TEMPERATURE 98.4 WBC 11.7 creatinine 0.85. No new issues overnight  17-MAXIMUM TEMPERATURE 98.4. No new issues overnight  2018-MAXIMUM TEMPERATURE 98.8. No new labs available. Anxious to go home  1/2 AF no PICC yet-tolerated dressing change well  Labs Reviewed, Medications Reviewed, Radiology Reviewed and Wound Reviewed.    Review of Systems:  Review of Systems   Constitutional: Negative for fever.   HENT: Negative for hearing loss.    Respiratory: Negative for cough and shortness of breath.    Cardiovascular: Negative for chest pain.   Gastrointestinal: Negative for abdominal pain, nausea and vomiting.   Genitourinary: Negative for dysuria.   Musculoskeletal: Positive for myalgias.   Neurological: Negative for focal weakness.   All other systems reviewed and are negative.      Hemodynamics:  Temp (24hrs), Av.9 °C (98.4 °F), Min:36.7 °C (98 °F), Max:37 °C (98.6 °F)  Temperature: 37 °C (98.6 °F)  Pulse  Av  Min: 52  Max: 73   Blood Pressure: 121/63       Physical Exam:  Physical Exam   Constitutional: He is oriented to person, place, and time. He appears well-developed and well-nourished. No distress.   HENT:   Head: Normocephalic and atraumatic.   Eyes: EOM are normal. Pupils are equal, round, and reactive to light. No scleral icterus.   Neck: Neck supple.   Cardiovascular: Normal rate and regular rhythm.    No murmur heard.  Pulmonary/Chest: Effort normal. No respiratory distress. He has no wheezes. He has no rales.   Abdominal: Soft. He exhibits no distension. There is no tenderness.   Musculoskeletal: He exhibits edema.   Right foot edema  Lateral incision intact  The plantar surface ulcer has callus    Neurological: He is alert and oriented to person, place, and time.   Skin: No erythema.   Nursing note  and vitals reviewed.      Meds:    Current Facility-Administered Medications:   •  insulin glargine  •  naproxen  •  oxycodone immediate-release **OR** oxycodone immediate-release  •  PICC Line Insertion has been implemented **AND** May use Lidocaine 1% not to exceed 3 mls for local at insertion site **AND** NOTIFY MD **AND** Tip to dwell in the superior vena cava **AND** Do not use PICC Line until placement verified by Chest X Ray **AND** DX-CHEST-FOR PICC LINE Perform procedure in: PICC Room **AND** If radiologist reading of chest X-ray states any of the following the PICC should be used **AND** Further evaluation of the PICC placement can be retrieved from X-Ray and Imaging **AND** Blood draws through PICC line; draws by RN only **AND** FLUSHING GUIDELINES WHEN IN USE **AND** normal saline PF **AND** FLUSHING GUIDELINES WHEN NOT IN USE **AND** DRESSING MAINTENANCE **AND** Change needleless pressure ports and IV tubing every 72 hours per hospital policy **AND** TUBING **AND** If there is an MD order to remove the PICC line, any RN may remove the PICC line **AND** [] PATIENT EDUCATION MATERIALS **AND** NURSING COMMUNICATION  •  DAPTOmycin  •  aspirin EC  •  atenolol  •  atorvastatin  •  clopidogrel  •  enoxaparin  •  furosemide  •  gabapentin  •  isosorbide dinitrate  •  NIFEdipine SR  •  omeprazole  •  pentoxifylline CR  •  senna-docusate **AND** polyethylene glycol/lytes **AND** magnesium hydroxide **AND** bisacodyl  •  Respiratory Care per Protocol  •  acetaminophen  •  insulin regular **AND** Accu-Chek ACHS **AND** NOTIFY MD and PharmD **AND** glucose 4 g **AND** dextrose 50%  •  ondansetron  •  ondansetron  •  promethazine  •  promethazine  •  prochlorperazine    Labs:  Recent Labs      17   0220  18   0244   WBC  11.2*  8.7   RBC  4.12*  3.80*   HEMOGLOBIN  11.5*  10.6*   HEMATOCRIT  35.1*  32.7*   MCV  85.2  86.1   MCH  27.9  27.9   RDW  44.7  45.8   PLATELETCT  167  174   MPV  9.6  9.7      Recent Labs      17   0220  18   0245   SODIUM  132*  134*   POTASSIUM  3.7  3.7   CHLORIDE  105  105   CO2  21  24   GLUCOSE  168*  175*   BUN  11  12   CPKTOTAL   --   26     Recent Labs      170  18   0245   CREATININE  0.73  0.74       Imaging:  Dx-foot-complete 3+ Right    Result Date: 2017 1:11 PM HISTORY/REASON FOR EXAM:  Atraumatic Pain/Swelling/Deformity. TECHNIQUE/EXAM DESCRIPTION:  3 views RIGHT foot. COMPARISON:  None. FINDINGS:  There is soft tissue swelling and soft tissue gas adjacent to the distal right fourth metatarsal and right fourth toe. There is destruction of the distal right fourth metatarsal and base of the proximal phalanx. There may be erosive changes involving the margins of the fifth MTP joint. No other destructive lesions no acute fracture identified.     Distal lateral soft tissue swelling and gas consistent with infection. Destruction of the distal fourth metatarsal base of the proximal phalanx consistent with osteomyelitis. Possible osteomyelitis involving the distal fifth metatarsal base of the fifth proximal phalanx.    [facility]/imag    Result Date: 2017  Lower Extremity  Arterial Duplex Report  Vascular Laboratory  CONCLUSIONS  BAILEY FARNSWORTH  Exam Date:     2017 10:50  Room #:     Inpatient  Priority:     Routine  Ht (in):             Wt (lb):  Ordering Physician:        CORBIN MENDENHALL  Referring Physician:       CORBIN MENDENHALL  Sonographer:               Steffi Boyle RVT  Study Type:                Complete Bilateral  Technical Quality:         Adequate  Age:    62    Gender:     M  MRN:    [id number]  :    1955      BSA:  Indications:     Claudication  CPT Codes:       44424  ICD Codes:       I70.213  History:         Unhealing wound on ball of right foot x4 months. History of                   diabetes.  Limitations:                RIGHT  Waveform        Peak Systolic Velocity (cm/s)                   Prox    Prox-Mid  Mid    Mid-Dist  Distal  Triphasic                         139                      CFA  Biphasic        121                                        PFA  Triphasic       164               116              85      SFA  Triphasic                         78                       POP  Bi, non-        155                                139     AT  reversed  Monophasic      19                                 17      PT  Absent          0                                  41      SHANNON                LEFT  Waveform        Peak Systolic Velocity (cm/s)                  Prox    Prox-Mid  Mid    Mid-Dist  Distal  Triphasic                         141                      CFA  Triphasic       120                                        PFA  Triphasic       129               98               70      SFA  Triphasic                         55                       POP  Bi, non-        59                                 78      AT  reversed  Bi, non-        65                                 23      PT  reversed  Absent          75                                 0       SHANNON  FINDINGS  Right.  Diffuse plaque is seen in the common femoral, profunda femoral, femoral,  and popliteal arteries without evidence of hemodynamically significant  stenosis.  No flow can be demonstrated in the proximal peroneal artery.  Waveforms at the posterior tibial are monophasic with severly reduced  amplitude.  Waveforms at the anterior tibial artery are biphasic.  Left.  Diffuse plaque is seen in the common femoral, profunda femoral, femoral,  and popliteal arteries without evidence of hemodynamically significant  stenosis.  No flow can be demonstrated in the distal peroneal artery.  Waveforms at the posterior tibial and anterior tibial biphasic.     [facility]katja    Result Date: 2017   Vascular Laboratory  Conclusions  BAILEY FARNSWORTH  Age:    62    Gender:     M  MRN:    [id number]  :    1955      BSA:  Exam Date:     2017  12:43  Room #:     Inpatient  Priority:     Routine  Ht (in):             Wt (lb):  Ordering Physician:        CORBIN MENDENHALL  Referring Physician:       CORBIN MENDENHALL  Sonographer:               Steffi Boyle RVT  Study Type:                Limited Bilateral  Technical Quality:         Adequate  Indications:     Claudication  CPT Codes:       93568  ICD Codes:       I70.213  History:         Unhealing wound on ball of right foot. Diabetes mellitus.  Limitations:     IV in left arm.                 RIGHT  Waveform            Systolic BPs (mmHg)                             112           Brachial  Triphasic                                Common Femoral  Monophasic                 76            Posterior Tibial  Bi, non-                   118           Dorsalis Pedis  reversed                                           Peroneal                             1.05          TATY                                           TBI                       LEFT  Waveform        Systolic BPs (mmHg)                                           Brachial  Triphasic                                Common Femoral  Bi, non-                   0             Posterior Tibial  reversed  Bi, non-                   100           Dorsalis Pedis  reversed                                           Peroneal                             0.89          TATY                                           TBI  Findings  Right.  Ankle brachial index is 1.05.  Doppler waveform of the common femoral and popliteal arteries are of high  amplitude and triphasic.  Doppler waveforms of the posterior tibial at the ankle are monophasic.  Doppler waveforms at the ankle are brisk and biphasic.  Left.  Ankle brachial index is 0.89 for the dorsalis pedis artery.  Ankle brachial index for the posterior tibial artery is non-compressible.  Doppler waveform of the common femoral artery is of high amplitude and  triphasic.  Doppler waveforms at the ankle are brisk and biphasic.   Arterial duplex scan was performed in accordance with lower extremity  arterial evaluation protocol - see separate report.       Micro:  Results     Procedure Component Value Units Date/Time    CULTURE TISSUE W/ GRM STAIN [[id number]]  (Abnormal)  (Susceptibility) Collected:  12/29/17 1736    Order Status:  Completed Specimen:  Tissue Updated:  01/02/18 0909     Significant Indicator POS (POS)     Source TISS     Site 5th Metatarsal Head     Tissue Culture -- (A)     Growth noted after further incubation,see below for  organism identification.       Gram Stain Result --     Rare WBCs.  No organisms seen.       Tissue Culture -- (A)     Staphylococcus epidermidis  Rare growth       Tissue Culture -- (A)     Diphtheroids  Rare growth      Culture & Susceptibility     STAPHYLOCOCCUS EPIDERMIDIS     Antibiotic Sensitivity Microscan Unit Status    Ampicillin/sulbactam Resistant 16/8 mcg/mL Final    Clindamycin Sensitive <=0.5 mcg/mL Final    Daptomycin Sensitive <=0.5 mcg/mL Final    Erythromycin Resistant >4 mcg/mL Final    Moxifloxacin Resistant >4 mcg/mL Final    Oxacillin Resistant >2 mcg/mL Final    Penicillin Resistant >8 mcg/mL Final    Tetracycline Resistant >8 mcg/mL Final    Trimeth/Sulfa Resistant >2/38 mcg/mL Final    Vancomycin Sensitive 2 mcg/mL Final                       ANAEROBIC CULTURE [[id number]] Collected:  12/29/17 1736    Order Status:  Completed Specimen:  Tissue Updated:  01/02/18 0909     Significant Indicator NEG     Source TISS     Site 5th Metatarsal Head     Anaerobic Culture, Culture Res No Anaerobes isolated.    CULTURE TISSUE W/ GRM STAIN [[id number]]  (Abnormal)  (Susceptibility) Collected:  12/29/17 1730    Order Status:  Completed Specimen:  Tissue Updated:  01/02/18 0909     Gram Stain Result --     Few WBCs.  No organisms seen.       Significant Indicator POS (POS)     Source TISS     Site 4th Metatarsal Head     Tissue Culture -- (A)     Growth noted after further incubation,see below  "for  organism identification.       Tissue Culture -- (A)     Diphtheroids  Light growth       Tissue Culture -- (A)     Staphylococcus epidermidis  Light growth      Culture & Susceptibility     STAPHYLOCOCCUS EPIDERMIDIS     Antibiotic Sensitivity Microscan Unit Status    Ampicillin/sulbactam Resistant <=8/4 mcg/mL Final    Clindamycin Sensitive <=0.5 mcg/mL Final    Daptomycin Sensitive <=0.5 mcg/mL Final    Erythromycin Resistant >4 mcg/mL Final    Moxifloxacin Resistant >4 mcg/mL Final    Oxacillin Resistant >2 mcg/mL Final    Penicillin Resistant >8 mcg/mL Final    Tetracycline Resistant >8 mcg/mL Final    Trimeth/Sulfa Resistant >2/38 mcg/mL Final    Vancomycin Sensitive 2 mcg/mL Final                       ANAEROBIC CULTURE [[id number]] Collected:  12/29/17 1730    Order Status:  Completed Specimen:  Tissue Updated:  01/02/18 0909     Significant Indicator NEG     Source TISS     Site 4th Metatarsal Head     Anaerobic Culture, Culture Res No Anaerobes isolated.    BLOOD CULTURE [[id number]] Collected:  12/29/17 1304    Order Status:  Completed Specimen:  Blood from Peripheral Updated:  12/30/17 0844     Significant Indicator NEG     Source BLD     Site PERIPHERAL     Blood Culture --     No Growth    Note: Blood cultures are incubated for 5 days and  are monitored continuously.Positive blood cultures  are called to the RN and reported as soon as  they are identified.      Narrative:       Per Hospital Policy: Only change Specimen Src: to \"Line\" if  specified by physician order.    BLOOD CULTURE [[id number]] Collected:  12/29/17 1305    Order Status:  Completed Specimen:  Blood from Peripheral Updated:  12/30/17 0844     Significant Indicator NEG     Source BLD     Site PERIPHERAL     Blood Culture --     No Growth    Note: Blood cultures are incubated for 5 days and  are monitored continuously.Positive blood cultures  are called to the RN and reported as soon as  they are identified.      Narrative:       Per " "Hospital Policy: Only change Specimen Src: to \"Line\" if  specified by physician order.    GRAM STAIN [[id number]] Collected:  12/29/17 1736    Order Status:  Completed Specimen:  Tissue Updated:  12/30/17 0748     Significant Indicator .     Source TISS     Site 5th Metatarsal Head     Gram Stain Result --     Rare WBCs.  No organisms seen.      GRAM STAIN [[id number]] Collected:  12/29/17 1730    Order Status:  Completed Specimen:  Tissue Updated:  12/30/17 0748     Significant Indicator .     Source TISS     Site 4th Metatarsal Head     Gram Stain Result --     Few WBCs.  No organisms seen.      CULTURE WOUND W/ GRAM STAIN [[id number]]     Order Status:  No result Specimen:  Wound from Right Foot           Assessment:  Active Hospital Problems    Diagnosis   • *Osteomyelitis of right foot (CMS-HCC) [M86.9]   • Chronic anticoagulation [Z79.01]   • CHF (congestive heart failure) (CMS-HCC) [I50.9]   • COPD (chronic obstructive pulmonary disease) (CMS-HCC) [J44.9]   • Coronary artery disease [I25.10]   • DM (diabetes mellitus) (CMS-HCC) [E11.9]   • Essential hypertension [I10]   • PAOD (peripheral arterial occlusive disease) (CMS-HCC) [I77.9]       Plan:  Diabetic foot ulcer with osteomyelitis  Afebrile  No leukocytosis  Status post I&D and right fourth and fifth metatarsal head and proximal phalanx excision on 12/29/17  Cultures are Coagulase negative staph and diphtheroids  PICC line  Continue daptomycin  Aim  for 6 weeks post-op  Stop date 2/9/2018    Diabetes mellitus  Keep BS under 150 to help control current infection      Discussed with internal medicine/LPS  "

## 2018-01-03 NOTE — PROGRESS NOTES
Infectious Disease Progress Note    Author: Marianna Vicente M.D. Date & Time of service: 1/3/2018  2:14 PM    Chief Complaint:  Diabetic foot infection    Interval History:  -MAXIMUM TEMPERATURE 98.4 WBC 11.7 creatinine 0.85. No new issues overnight  17-MAXIMUM TEMPERATURE 98.4. No new issues overnight  2018-MAXIMUM TEMPERATURE 98.8. No new labs available. Anxious to go home  1/2 AF no PICC yet-tolerated dressing change well  1/3 AF got PICC-does not want to go to SNF. Denies SE abx  Labs Reviewed, Medications Reviewed, Radiology Reviewed and Wound Reviewed.    Review of Systems:  Review of Systems   Constitutional: Negative for fever.   HENT: Negative for hearing loss.    Respiratory: Negative for cough and shortness of breath.    Cardiovascular: Negative for chest pain.   Gastrointestinal: Negative for abdominal pain, nausea and vomiting.   Genitourinary: Negative for dysuria.   Musculoskeletal: Positive for myalgias.   Neurological: Negative for focal weakness.   All other systems reviewed and are negative.      Hemodynamics:  Temp (24hrs), Av.6 °C (97.9 °F), Min:36.4 °C (97.5 °F), Max:37 °C (98.6 °F)  Temperature: 36.4 °C (97.5 °F)  Pulse  Av.6  Min: 48  Max: 73   Blood Pressure: 116/74       Physical Exam:  Physical Exam   Constitutional: He is oriented to person, place, and time. He appears well-developed and well-nourished. No distress.   HENT:   Head: Normocephalic and atraumatic.   Eyes: EOM are normal. Pupils are equal, round, and reactive to light. No scleral icterus.   Neck: Neck supple.   Cardiovascular: Normal rate and regular rhythm.    No murmur heard.  Pulmonary/Chest: Effort normal. No respiratory distress. He has no wheezes. He has no rales.   Abdominal: Soft. He exhibits no distension. There is no tenderness.   Musculoskeletal: He exhibits edema.   LUE PICC  Right foot dressed   Neurological: He is alert and oriented to person, place, and time.   Skin: No erythema.    Nursing note and vitals reviewed.      Meds:    Current Facility-Administered Medications:   •  insulin glargine  •  naproxen  •  oxycodone immediate-release **OR** oxycodone immediate-release  •  PICC Line Insertion has been implemented **AND** May use Lidocaine 1% not to exceed 3 mls for local at insertion site **AND** NOTIFY MD **AND** Tip to dwell in the superior vena cava **AND** Do not use PICC Line until placement verified by Chest X Ray **AND** DX-CHEST-FOR PICC LINE Perform procedure in: PICC Room **AND** If radiologist reading of chest X-ray states any of the following the PICC should be used **AND** Further evaluation of the PICC placement can be retrieved from X-Ray and Imaging **AND** Blood draws through PICC line; draws by RN only **AND** FLUSHING GUIDELINES WHEN IN USE **AND** normal saline PF **AND** FLUSHING GUIDELINES WHEN NOT IN USE **AND** DRESSING MAINTENANCE **AND** Change needleless pressure ports and IV tubing every 72 hours per hospital policy **AND** TUBING **AND** If there is an MD order to remove the PICC line, any RN may remove the PICC line **AND** [] PATIENT EDUCATION MATERIALS **AND** NURSING COMMUNICATION  •  DAPTOmycin  •  aspirin EC  •  atenolol  •  atorvastatin  •  clopidogrel  •  enoxaparin  •  furosemide  •  gabapentin  •  isosorbide dinitrate  •  NIFEdipine SR  •  omeprazole  •  pentoxifylline CR  •  senna-docusate **AND** polyethylene glycol/lytes **AND** magnesium hydroxide **AND** bisacodyl  •  Respiratory Care per Protocol  •  acetaminophen  •  insulin regular **AND** Accu-Chek ACHS **AND** NOTIFY MD and PharmD **AND** glucose 4 g **AND** dextrose 50%  •  ondansetron  •  ondansetron  •  promethazine  •  promethazine  •  prochlorperazine    Labs:  Recent Labs      18   0244   WBC  8.7   RBC  3.80*   HEMOGLOBIN  10.6*   HEMATOCRIT  32.7*   MCV  86.1   MCH  27.9   RDW  45.8   PLATELETCT  174   MPV  9.7     Recent Labs      18   0245   SODIUM  134*    POTASSIUM  3.7   CHLORIDE  105   CO2  24   GLUCOSE  175*   BUN  12   CPKTOTAL  26     Recent Labs      18   0245   CREATININE  0.74       Imaging:  Dx-foot-complete 3+ Right    Result Date: 2017 1:11 PM HISTORY/REASON FOR EXAM:  Atraumatic Pain/Swelling/Deformity. TECHNIQUE/EXAM DESCRIPTION:  3 views RIGHT foot. COMPARISON:  None. FINDINGS:  There is soft tissue swelling and soft tissue gas adjacent to the distal right fourth metatarsal and right fourth toe. There is destruction of the distal right fourth metatarsal and base of the proximal phalanx. There may be erosive changes involving the margins of the fifth MTP joint. No other destructive lesions no acute fracture identified.     Distal lateral soft tissue swelling and gas consistent with infection. Destruction of the distal fourth metatarsal base of the proximal phalanx consistent with osteomyelitis. Possible osteomyelitis involving the distal fifth metatarsal base of the fifth proximal phalanx.    [facility] Art Duplx/imag    Result Date: 2017  Lower Extremity  Arterial Duplex Report  Vascular Laboratory  CONCLUSIONS  BAILEY FARNSWORTH  Exam Date:     2017 10:50  Room #:     Inpatient  Priority:     Routine  Ht (in):             Wt (lb):  Ordering Physician:        CORBIN MENDENHALL  Referring Physician:       CORBIN MENDENHALL  Sonographer:               Steffi Boyle RVT  Study Type:                Complete Bilateral  Technical Quality:         Adequate  Age:    62    Gender:     M  MRN:    [id number]  :    1955      BSA:  Indications:     Claudication  CPT Codes:       39144  ICD Codes:       I70.213  History:         Unhealing wound on ball of right foot x4 months. History of                   diabetes.  Limitations:                RIGHT  Waveform        Peak Systolic Velocity (cm/s)                  Prox    Prox-Mid  Mid    Mid-Dist  Distal  Triphasic                         139                      CFA  Biphasic        121                                         PFA  Triphasic       164               116              85      SFA  Triphasic                         78                       POP  Bi, non-        155                                139     AT  reversed  Monophasic      19                                 17      PT  Absent          0                                  41      SHANNON                LEFT  Waveform        Peak Systolic Velocity (cm/s)                  Prox    Prox-Mid  Mid    Mid-Dist  Distal  Triphasic                         141                      CFA  Triphasic       120                                        PFA  Triphasic       129               98               70      SFA  Triphasic                         55                       POP  Bi, non-        59                                 78      AT  reversed  Bi, non-        65                                 23      PT  reversed  Absent          75                                 0       SHANNON  FINDINGS  Right.  Diffuse plaque is seen in the common femoral, profunda femoral, femoral,  and popliteal arteries without evidence of hemodynamically significant  stenosis.  No flow can be demonstrated in the proximal peroneal artery.  Waveforms at the posterior tibial are monophasic with severly reduced  amplitude.  Waveforms at the anterior tibial artery are biphasic.  Left.  Diffuse plaque is seen in the common femoral, profunda femoral, femoral,  and popliteal arteries without evidence of hemodynamically significant  stenosis.  No flow can be demonstrated in the distal peroneal artery.  Waveforms at the posterior tibial and anterior tibial biphasic.     Le Art/katja    Result Date: 2017   Vascular Laboratory  Conclusions  BAILEY FARNSWORTH  Age:    62    Gender:     M  MRN:    [id number]  :    1955      BSA:  Exam Date:     2017 12:43  Room #:     Inpatient  Priority:     Routine  Ht (in):             Wt (lb):  Ordering Physician:        CORBIN MENDENHALL   Referring Physician:       CORBIN MENDENHALL  Sonographer:               Steffi Boyle RVT  Study Type:                Limited Bilateral  Technical Quality:         Adequate  Indications:     Claudication  CPT Codes:       42219  ICD Codes:       I70.213  History:         Unhealing wound on ball of right foot. Diabetes mellitus.  Limitations:     IV in left arm.                 RIGHT  Waveform            Systolic BPs (mmHg)                             112           Brachial  Triphasic                                Common Femoral  Monophasic                 76            Posterior Tibial  Bi, non-                   118           Dorsalis Pedis  reversed                                           Peroneal                             1.05          TATY                                           TBI                       LEFT  Waveform        Systolic BPs (mmHg)                                           Brachial  Triphasic                                Common Femoral  Bi, non-                   0             Posterior Tibial  reversed  Bi, non-                   100           Dorsalis Pedis  reversed                                           Peroneal                             0.89          TATY                                           TBI  Findings  Right.  Ankle brachial index is 1.05.  Doppler waveform of the common femoral and popliteal arteries are of high  amplitude and triphasic.  Doppler waveforms of the posterior tibial at the ankle are monophasic.  Doppler waveforms at the ankle are brisk and biphasic.  Left.  Ankle brachial index is 0.89 for the dorsalis pedis artery.  Ankle brachial index for the posterior tibial artery is non-compressible.  Doppler waveform of the common femoral artery is of high amplitude and  triphasic.  Doppler waveforms at the ankle are brisk and biphasic.  Arterial duplex scan was performed in accordance with lower extremity  arterial evaluation protocol - see separate report.        Micro:  Results     Procedure Component Value Units Date/Time    CULTURE TISSUE W/ GRM STAIN [[id number]]  (Abnormal)  (Susceptibility) Collected:  12/29/17 1736    Order Status:  Completed Specimen:  Tissue Updated:  01/02/18 0909     Significant Indicator POS (POS)     Source TISS     Site 5th Metatarsal Head     Tissue Culture -- (A)     Growth noted after further incubation,see below for  organism identification.       Gram Stain Result --     Rare WBCs.  No organisms seen.       Tissue Culture -- (A)     Staphylococcus epidermidis  Rare growth       Tissue Culture -- (A)     Diphtheroids  Rare growth      Culture & Susceptibility     STAPHYLOCOCCUS EPIDERMIDIS     Antibiotic Sensitivity Microscan Unit Status    Ampicillin/sulbactam Resistant 16/8 mcg/mL Final    Clindamycin Sensitive <=0.5 mcg/mL Final    Daptomycin Sensitive <=0.5 mcg/mL Final    Erythromycin Resistant >4 mcg/mL Final    Moxifloxacin Resistant >4 mcg/mL Final    Oxacillin Resistant >2 mcg/mL Final    Penicillin Resistant >8 mcg/mL Final    Tetracycline Resistant >8 mcg/mL Final    Trimeth/Sulfa Resistant >2/38 mcg/mL Final    Vancomycin Sensitive 2 mcg/mL Final                       ANAEROBIC CULTURE [[id number]] Collected:  12/29/17 1736    Order Status:  Completed Specimen:  Tissue Updated:  01/02/18 0909     Significant Indicator NEG     Source TISS     Site 5th Metatarsal Head     Anaerobic Culture, Culture Res No Anaerobes isolated.    CULTURE TISSUE W/ GRM STAIN [[id number]]  (Abnormal)  (Susceptibility) Collected:  12/29/17 1730    Order Status:  Completed Specimen:  Tissue Updated:  01/02/18 0909     Gram Stain Result --     Few WBCs.  No organisms seen.       Significant Indicator POS (POS)     Source TISS     Site 4th Metatarsal Head     Tissue Culture -- (A)     Growth noted after further incubation,see below for  organism identification.       Tissue Culture -- (A)     Diphtheroids  Light growth       Tissue Culture -- (A)      "Staphylococcus epidermidis  Light growth      Culture & Susceptibility     STAPHYLOCOCCUS EPIDERMIDIS     Antibiotic Sensitivity Microscan Unit Status    Ampicillin/sulbactam Resistant <=8/4 mcg/mL Final    Clindamycin Sensitive <=0.5 mcg/mL Final    Daptomycin Sensitive <=0.5 mcg/mL Final    Erythromycin Resistant >4 mcg/mL Final    Moxifloxacin Resistant >4 mcg/mL Final    Oxacillin Resistant >2 mcg/mL Final    Penicillin Resistant >8 mcg/mL Final    Tetracycline Resistant >8 mcg/mL Final    Trimeth/Sulfa Resistant >2/38 mcg/mL Final    Vancomycin Sensitive 2 mcg/mL Final                       ANAEROBIC CULTURE [[id number]] Collected:  12/29/17 1730    Order Status:  Completed Specimen:  Tissue Updated:  01/02/18 0909     Significant Indicator NEG     Source TISS     Site 4th Metatarsal Head     Anaerobic Culture, Culture Res No Anaerobes isolated.    BLOOD CULTURE [[id number]] Collected:  12/29/17 1304    Order Status:  Completed Specimen:  Blood from Peripheral Updated:  12/30/17 0844     Significant Indicator NEG     Source BLD     Site PERIPHERAL     Blood Culture --     No Growth    Note: Blood cultures are incubated for 5 days and  are monitored continuously.Positive blood cultures  are called to the RN and reported as soon as  they are identified.      Narrative:       Per Hospital Policy: Only change Specimen Src: to \"Line\" if  specified by physician order.    BLOOD CULTURE [[id number]] Collected:  12/29/17 1305    Order Status:  Completed Specimen:  Blood from Peripheral Updated:  12/30/17 0844     Significant Indicator NEG     Source BLD     Site PERIPHERAL     Blood Culture --     No Growth    Note: Blood cultures are incubated for 5 days and  are monitored continuously.Positive blood cultures  are called to the RN and reported as soon as  they are identified.      Narrative:       Per Hospital Policy: Only change Specimen Src: to \"Line\" if  specified by physician order.    GRAM STAIN [[id number]] " Collected:  12/29/17 1736    Order Status:  Completed Specimen:  Tissue Updated:  12/30/17 0748     Significant Indicator .     Source TISS     Site 5th Metatarsal Head     Gram Stain Result --     Rare WBCs.  No organisms seen.      GRAM STAIN [[id number]] Collected:  12/29/17 1730    Order Status:  Completed Specimen:  Tissue Updated:  12/30/17 0748     Significant Indicator .     Source TISS     Site 4th Metatarsal Head     Gram Stain Result --     Few WBCs.  No organisms seen.      CULTURE WOUND W/ GRAM STAIN [[id number]]     Order Status:  No result Specimen:  Wound from Right Foot           Assessment:  Active Hospital Problems    Diagnosis   • *Osteomyelitis of right foot (CMS-HCC) [M86.9]   • Chronic anticoagulation [Z79.01]   • CHF (congestive heart failure) (CMS-HCC) [I50.9]   • COPD (chronic obstructive pulmonary disease) (CMS-HCC) [J44.9]   • Coronary artery disease [I25.10]   • DM (diabetes mellitus) (CMS-HCC) [E11.9]   • Essential hypertension [I10]   • PAOD (peripheral arterial occlusive disease) (CMS-HCC) [I77.9]       Plan:  Diabetic foot ulcer with osteomyelitis  Afebrile  No leukocytosis  Status post I&D and right fourth and fifth metatarsal head and proximal phalanx excision on 12/29/17  Cultures are Coagulase negative staph and diphtheroids  Continue daptomycin  Aim  for 6 weeks post-op  Stop date 2/9/2018    Diabetes mellitus  Keep BS under 150 to help control current infection

## 2018-01-03 NOTE — PROGRESS NOTES
[facility] Hospitalist Progress Note    Date of Service: 1/3/2018    Chief Complaint  62 y.o. male with CAD, COPD, CHF, T2DM, admitted 2017 with diabetic right foot ulcer and osteomyelitis. S/p surgical debridement, and excision of 4th, 5th MT head, 4th and 5th proximal phalanx (partial). Cultures grew CoNS and diphtheroids. On antibiotics, changed to daptomycin x 6 weeks by ID.     Interval Problem Update  1/3/2018 - uneventful night. VSS. Afebrile. Saturating well on RA.  BG trend better, . Per SW, needs to go to SNF for infusion. PICC line placed.     > Seen and examined. States he does not have any pain. States he does not want to go to a SNF for infusion. Denied any CP, SOB, nausea, vomiting, abd pain. +BM.        Consultants/Specialty  ID  LPS  Ortho     Disposition  Monitor on the medical floors. Prolonged IV antibiotics at SNF being arranged.       ROS    Pertinent positives/negatives as mentioned above.     A complete review of systems was done. All other systems were negative.      Physical Exam  Laboratory/Imaging   Hemodynamics  Temp (24hrs), Av.7 °C (98.1 °F), Min:36.4 °C (97.6 °F), Max:37 °C (98.6 °F)   Temperature: 36.4 °C (97.6 °F)  Pulse  Av.6  Min: 48  Max: 73    Blood Pressure: 106/62      Respiratory      Respiration: 17, Pulse Oximetry: 93 %        RUL Breath Sounds: Clear, RML Breath Sounds: Clear, RLL Breath Sounds: Clear, NATASHA Breath Sounds: Clear, LLL Breath Sounds: Clear    Fluids    Intake/Output Summary (Last 24 hours) at 18 0712  Last data filed at 18 1600   Gross per 24 hour   Intake              400 ml   Output              800 ml   Net             -400 ml       Nutrition  Orders Placed This Encounter   Procedures   • DIET ORDER     Standing Status:   Standing     Number of Occurrences:   1     Order Specific Question:   Diet:     Answer:   Diabetic [3]     Order Specific Question:   Diet:     Answer:   Cardiac [6]     Physical Exam   Constitutional: He is  oriented to person, place, and time. He appears well-developed. No distress.   Obese. Body mass index is 31.97 kg/m².   HENT:   Head: Normocephalic and atraumatic.   Mouth/Throat: Oropharynx is clear and moist. No oropharyngeal exudate.   Eyes: EOM are normal. Pupils are equal, round, and reactive to light. Right eye exhibits no discharge. Left eye exhibits no discharge. No scleral icterus.   Neck: Normal range of motion. Neck supple. No thyromegaly present.   Cardiovascular: Normal rate and regular rhythm.  Exam reveals no gallop and no friction rub.    No murmur heard.  Pulmonary/Chest: Effort normal and breath sounds normal. He has no wheezes. He has no rales. He exhibits no tenderness.   Abdominal: Soft. Bowel sounds are normal. There is no tenderness. There is no rebound and no guarding.   Musculoskeletal: Normal range of motion. He exhibits tenderness (minimal right foot tenderness). He exhibits no edema.   right leg in boot.  (+) amputated toes on right foot   Lymphadenopathy:     He has no cervical adenopathy.   Neurological: He is alert and oriented to person, place, and time. No cranial nerve deficit. Coordination normal.   Skin: Skin is warm and dry. No rash noted. He is not diaphoretic. No erythema.   Psychiatric: His behavior is normal. Thought content normal.   Grumpy.    Vitals reviewed.      Recent Labs      01/02/18   0244   WBC  8.7   RBC  3.80*   HEMOGLOBIN  10.6*   HEMATOCRIT  32.7*   MCV  86.1   MCH  27.9   MCHC  32.4*   RDW  45.8   PLATELETCT  174   MPV  9.7     Recent Labs      01/02/18   0245   SODIUM  134*   POTASSIUM  3.7   CHLORIDE  105   CO2  24   GLUCOSE  175*   BUN  12   CREATININE  0.74   CALCIUM  8.2*                      Assessment/Plan     * Osteomyelitis of right foot (CMS-[facility])- (present on admission)   Assessment & Plan    - S/p toe amputations.   - will need prolonged IV antibiotics with daptomycin per ID. Pt wants to go home, but unfortunately not much choice but to go to SNF  for IV antibiotics. CM/SW working on placement.   - continue wound care per LPS.   - Continue pain control with PRN naproxen, and PO oxycodone. IV narcotics discontinued as suspect component of IV narcotic seeking behavior.           Essential hypertension- (present on admission)   Assessment & Plan    - Well-controlled.  -continue outpatient blood pressure medication (nifedipine, isordil, atenolol, lasix).        Acquired circulating anticoagulants (CMS-HCC)- (present on admission)   Assessment & Plan    - patient was on outpatient coumadin, but was also on outpatient weight-based Lovenox. Initial INR subtherapeutic, unclear why he's on anticoagulation.   - continue lovenox for now.         Chronic CHF (CMS-HCC)- (present on admission)   Assessment & Plan    - compensated.   - continue home dose PO lasix, ASA, plavix, statin, beta blocker, nitrates.         DM (diabetes mellitus) (CMS-HCC)- (present on admission)   Assessment & Plan    - better but still uncontrolled. Goal BG<150.   - increase lantus to 28 units. Continue SSI. Accuchecks AC and HS. Continue to hold OHA for now while in-house.           COPD (chronic obstructive pulmonary disease) (CMS-HCC)- (present on admission)   Assessment & Plan    - not in acute exacerbation. Stable.   - continue RT protocol, PRN O2.         Coronary artery disease- (present on admission)   Assessment & Plan    - stable. No ACS.   - continue DAPT with ASA and plavix, statin, isordil,  Beta blocker.         PAOD (peripheral arterial occlusive disease) (CMS-HCC)- (present on admission)   Assessment & Plan    - Continue DAPT with ASA, and plavix, and statin.             Reviewed items::  Labs reviewed, Radiology images reviewed and Medications reviewed  Dorsey catheter::  No Dorsey  DVT prophylaxis pharmacological::  Enoxaparin (Lovenox)  Antibiotics:  Treating active infection/contamination beyond 24 hours perioperative coverage

## 2018-01-03 NOTE — CARE PLAN
Problem: Communication  Goal: The ability to communicate needs accurately and effectively will improve  Outcome: PROGRESSING AS EXPECTED      Problem: Safety  Goal: Will remain free from injury  Outcome: PROGRESSING AS EXPECTED    Goal: Will remain free from falls  Outcome: PROGRESSING AS EXPECTED      Problem: Infection  Goal: Will remain free from infection  Outcome: PROGRESSING AS EXPECTED      Problem: Venous Thromboembolism (VTW)/Deep Vein Thrombosis (DVT) Prevention:  Goal: Patient will participate in Venous Thrombosis (VTE)/Deep Vein Thrombosis (DVT)Prevention Measures  Outcome: PROGRESSING AS EXPECTED      Problem: Bowel/Gastric:  Goal: Normal bowel function is maintained or improved  Outcome: PROGRESSING SLOWER THAN EXPECTED

## 2018-01-03 NOTE — PROGRESS NOTES
Consents confirmed, vessel patency confirmed with ultrasound, please reference chart for printout. Risks and benefits of procedure explained to patent/family and education regarding central line associated bloodstream infection provided. Questions and concerns addressed.     PICC placed in LUE per MD order with ultrasound guidance. 4Fr, single lumen PICC placed in the Brachial vein after 1 attempt(s). 1% lidocaine injected intradermally, 21 mindi micro introducer needle and modified Seldinger technique used. 49 cm catheter inserted with good blood return. Each lumen flushed without resistance with 10ml 0.9% normal saline. PICC line secured Biopatch and Tegaderm applied.     CXR ordered, PICC placement confirmation will be provided by the Radiologist via interpretation of the follow up chest x-ray to confirm tip location, unable to get 3CG confirmation. Pt tolerated procedure well. Pt condition relayed to unit RN or ordering physician via this post procedure note in the EMR.    Dialogfeed Power PICC Ref # [id number], Lot # [id number]    As Per CXR PICC OK for use.

## 2018-01-04 LAB
GLUCOSE BLD-MCNC: 129 MG/DL (ref 65–99)
GLUCOSE BLD-MCNC: 131 MG/DL (ref 65–99)
GLUCOSE BLD-MCNC: 158 MG/DL (ref 65–99)
GLUCOSE BLD-MCNC: 174 MG/DL (ref 65–99)
GLUCOSE BLD-MCNC: 234 MG/DL (ref 65–99)

## 2018-01-04 PROCEDURE — 700102 HCHG RX REV CODE 250 W/ 637 OVERRIDE(OP): Performed by: INTERNAL MEDICINE

## 2018-01-04 PROCEDURE — 82962 GLUCOSE BLOOD TEST: CPT | Mod: 91

## 2018-01-04 PROCEDURE — 99232 SBSQ HOSP IP/OBS MODERATE 35: CPT | Performed by: INTERNAL MEDICINE

## 2018-01-04 PROCEDURE — A9270 NON-COVERED ITEM OR SERVICE: HCPCS | Performed by: INTERNAL MEDICINE

## 2018-01-04 PROCEDURE — 770006 HCHG ROOM/CARE - MED/SURG/GYN SEMI*

## 2018-01-04 PROCEDURE — 700111 HCHG RX REV CODE 636 W/ 250 OVERRIDE (IP): Performed by: INTERNAL MEDICINE

## 2018-01-04 PROCEDURE — 700111 HCHG RX REV CODE 636 W/ 250 OVERRIDE (IP): Mod: JG | Performed by: INTERNAL MEDICINE

## 2018-01-04 RX ORDER — WARFARIN SODIUM 10 MG/1
10 TABLET ORAL
Status: DISCONTINUED | OUTPATIENT
Start: 2018-01-04 | End: 2018-01-05

## 2018-01-04 RX ADMIN — ENOXAPARIN SODIUM 100 MG: 100 INJECTION SUBCUTANEOUS at 20:29

## 2018-01-04 RX ADMIN — OMEPRAZOLE 20 MG: 20 CAPSULE, DELAYED RELEASE ORAL at 08:40

## 2018-01-04 RX ADMIN — STANDARDIZED SENNA CONCENTRATE AND DOCUSATE SODIUM 2 TABLET: 8.6; 5 TABLET, FILM COATED ORAL at 08:38

## 2018-01-04 RX ADMIN — INSULIN HUMAN 1 UNITS: 100 INJECTION, SOLUTION PARENTERAL at 16:38

## 2018-01-04 RX ADMIN — ATORVASTATIN CALCIUM 20 MG: 20 TABLET, FILM COATED ORAL at 20:29

## 2018-01-04 RX ADMIN — GABAPENTIN 100 MG: 100 CAPSULE ORAL at 15:40

## 2018-01-04 RX ADMIN — ATENOLOL 50 MG: 50 TABLET ORAL at 20:29

## 2018-01-04 RX ADMIN — GABAPENTIN 100 MG: 100 CAPSULE ORAL at 08:40

## 2018-01-04 RX ADMIN — ASPIRIN 81 MG: 81 TABLET, COATED ORAL at 08:38

## 2018-01-04 RX ADMIN — FUROSEMIDE 10 MG: 20 TABLET ORAL at 08:38

## 2018-01-04 RX ADMIN — INSULIN HUMAN 2 UNITS: 100 INJECTION, SOLUTION PARENTERAL at 20:25

## 2018-01-04 RX ADMIN — ISOSORBIDE DINITRATE 20 MG: 10 TABLET ORAL at 08:40

## 2018-01-04 RX ADMIN — WARFARIN SODIUM 10 MG: 10 TABLET ORAL at 16:41

## 2018-01-04 RX ADMIN — ATENOLOL 50 MG: 50 TABLET ORAL at 08:38

## 2018-01-04 RX ADMIN — DAPTOMYCIN 600 MG: 500 INJECTION, POWDER, LYOPHILIZED, FOR SOLUTION INTRAVENOUS at 15:40

## 2018-01-04 RX ADMIN — NIFEDIPINE 30 MG: 30 TABLET, FILM COATED, EXTENDED RELEASE ORAL at 08:40

## 2018-01-04 RX ADMIN — ENOXAPARIN SODIUM 100 MG: 100 INJECTION SUBCUTANEOUS at 08:45

## 2018-01-04 RX ADMIN — INSULIN GLARGINE 28 UNITS: 100 INJECTION, SOLUTION SUBCUTANEOUS at 20:25

## 2018-01-04 RX ADMIN — CLOPIDOGREL 75 MG: 75 TABLET, FILM COATED ORAL at 20:29

## 2018-01-04 RX ADMIN — OXYCODONE HYDROCHLORIDE 20 MG: 10 TABLET ORAL at 08:44

## 2018-01-04 RX ADMIN — PENTOXIFYLLINE 400 MG: 400 TABLET, FILM COATED, EXTENDED RELEASE ORAL at 11:12

## 2018-01-04 RX ADMIN — ENOXAPARIN SODIUM 100 MG: 100 INJECTION SUBCUTANEOUS at 08:40

## 2018-01-04 RX ADMIN — INSULIN HUMAN 1 UNITS: 100 INJECTION, SOLUTION PARENTERAL at 11:06

## 2018-01-04 RX ADMIN — PENTOXIFYLLINE 400 MG: 400 TABLET, FILM COATED, EXTENDED RELEASE ORAL at 08:41

## 2018-01-04 RX ADMIN — ONDANSETRON 4 MG: 4 TABLET, ORALLY DISINTEGRATING ORAL at 20:29

## 2018-01-04 RX ADMIN — OXYCODONE HYDROCHLORIDE 20 MG: 10 TABLET ORAL at 04:37

## 2018-01-04 RX ADMIN — GABAPENTIN 100 MG: 100 CAPSULE ORAL at 20:29

## 2018-01-04 RX ADMIN — PENTOXIFYLLINE 400 MG: 400 TABLET, FILM COATED, EXTENDED RELEASE ORAL at 16:41

## 2018-01-04 ASSESSMENT — ENCOUNTER SYMPTOMS
ABDOMINAL PAIN: 0
FOCAL WEAKNESS: 0
MYALGIAS: 1
VOMITING: 0
COUGH: 0
NAUSEA: 0
FEVER: 0
SHORTNESS OF BREATH: 0

## 2018-01-04 ASSESSMENT — PAIN SCALES - GENERAL: PAINLEVEL_OUTOF10: 3

## 2018-01-04 NOTE — PROGRESS NOTES
[facility] Hospitalist Progress Note    Date of Service: 2018    Chief Complaint  62 y.o. male with CAD, COPD, CHF, T2DM, admitted 2017 with diabetic right foot ulcer and osteomyelitis. S/p surgical debridement, and excision of 4th, 5th MT head, 4th and 5th proximal phalanx (partial). Cultures grew CoNS and diphtheroids. On antibiotics, changed to daptomycin x 6 weeks by ID.  PICC line placed. Insulin adjusted for better BG control.    Interval Problem Update  2018 - no overnight events. Remains hemodynamically stable and afebrile. Stable on RA.  BG now well controlled, . Pending SNF placement.     > Seen and examined. Comfortable, in good spirits. Pain well controlled with PO pain meds. No nausea, vomiting, abd pain, CP, SOB. Has come to terms with need for SNF placement.       Consultants/Specialty  ID  LPS  Ortho     Disposition  Monitor on the medical floors. Await SNF placement for prolonged IV antibiotics.      ROS    Pertinent positives/negatives as mentioned above.     A complete review of systems was done. All other systems were negative.      Physical Exam  Laboratory/Imaging   Hemodynamics  Temp (24hrs), Av.7 °C (98 °F), Min:36.3 °C (97.3 °F), Max:37.2 °C (99 °F)   Temperature: 36.3 °C (97.3 °F)  Pulse  Av.9  Min: 48  Max: 73    Blood Pressure: 131/74      Respiratory      Respiration: 16, Pulse Oximetry: 93 %        RUL Breath Sounds: Clear, RML Breath Sounds: Clear, RLL Breath Sounds: Clear, NATASHA Breath Sounds: Clear, LLL Breath Sounds: Clear    Fluids    Intake/Output Summary (Last 24 hours) at 18 0711  Last data filed at 18 0600   Gross per 24 hour   Intake             1980 ml   Output             1425 ml   Net              555 ml       Nutrition  Orders Placed This Encounter   Procedures   • DIET ORDER     Standing Status:   Standing     Number of Occurrences:   1     Order Specific Question:   Diet:     Answer:   Diabetic [3]     Order Specific Question:   Diet:      Answer:   Cardiac [6]     Physical Exam   Constitutional: He is oriented to person, place, and time. He appears well-developed. No distress.   Obese. Body mass index is 31.97 kg/m².   HENT:   Head: Normocephalic and atraumatic.   Mouth/Throat: Oropharynx is clear and moist. No oropharyngeal exudate.   Eyes: EOM are normal. Pupils are equal, round, and reactive to light. Right eye exhibits no discharge. Left eye exhibits no discharge. No scleral icterus.   Neck: Normal range of motion. Neck supple. No thyromegaly present.   Cardiovascular: Normal rate and regular rhythm.  Exam reveals no gallop and no friction rub.    No murmur heard.  Pulmonary/Chest: Effort normal and breath sounds normal. He has no wheezes. He has no rales. He exhibits no tenderness.   Abdominal: Soft. Bowel sounds are normal. There is no tenderness. There is no rebound and no guarding.   Musculoskeletal: Normal range of motion. He exhibits tenderness (much improved right foot tenderness ). He exhibits no edema.   right leg in boot.  (+) amputated toes on right foot   Lymphadenopathy:     He has no cervical adenopathy.   Neurological: He is alert and oriented to person, place, and time. No cranial nerve deficit. Coordination normal.   Skin: Skin is warm and dry. No rash noted. He is not diaphoretic. No erythema.   Psychiatric: His behavior is normal. Thought content normal.   Better mood.   Vitals reviewed.      Recent Labs      01/02/18   0244   WBC  8.7   RBC  3.80*   HEMOGLOBIN  10.6*   HEMATOCRIT  32.7*   MCV  86.1   MCH  27.9   MCHC  32.4*   RDW  45.8   PLATELETCT  174   MPV  9.7     Recent Labs      01/02/18   0245   SODIUM  134*   POTASSIUM  3.7   CHLORIDE  105   CO2  24   GLUCOSE  175*   BUN  12   CREATININE  0.74   CALCIUM  8.2*                      Assessment/Plan     * Osteomyelitis of right foot (CMS-[facility])- (present on admission)   Assessment & Plan    - S/p toe amputations.   - await SNF placement for 6 weeks of prolonged IV  antibiotics with daptomycin per ID. CM/SW on-board.   - continue wound care per LPS.   - Continue pain control with PRN naproxen, and PO oxycodone.           Acquired circulating anticoagulants (CMS-HCC)- (present on admission)   Assessment & Plan    - for recurrent LE DVTs.   - resume coumadin per pharmacy dosing, with lovenox bridge until INR >2.         Chronic CHF (CMS-HCC)- (present on admission)   Assessment & Plan    - compensated.   - maintain home dose PO lasix, ASA, plavix, statin, beta blocker, nitrates.         DM (diabetes mellitus) (CMS-HCC)- (present on admission)   Assessment & Plan    - much improved and at goal now BG<150.   - continue lantus 28 units HS. Continue SSI. Accuchecks AC and HS. Continue to hold OHA for now while in-house.           COPD (chronic obstructive pulmonary disease) (CMS-HCC)- (present on admission)   Assessment & Plan    - remains stable and not in acute flare.   - continue RT protocol, PRN O2.         Coronary artery disease- (present on admission)   Assessment & Plan    - stable. No ACS.   - continue DAPT with ASA and plavix, statin, isordil,  Beta blocker.         Essential hypertension- (present on admission)   Assessment & Plan    - Well-controlled.  - continue outpatient blood pressure medication (nifedipine, isordil, atenolol, lasix).        PAOD (peripheral arterial occlusive disease) (CMS-HCC)- (present on admission)   Assessment & Plan    - Continue DAPT with ASA, and plavix, and statin.             Reviewed items::  Labs reviewed, Radiology images reviewed and Medications reviewed  Dorsey catheter::  No Dorsey  DVT prophylaxis pharmacological::  Enoxaparin (Lovenox)  Antibiotics:  Treating active infection/contamination beyond 24 hours perioperative coverage

## 2018-01-04 NOTE — PROGRESS NOTES
Inpatient Anticoagulation Service Note    Date: 1/4/2018  Reason for Anticoagulation: Deep Vein Thrombosis        Hemoglobin Value: (!) 10.6  Hematocrit Value: (!) 32.7  Lab Platelet Value: 174  Target INR: 2.0 to 3.0    INR from last 7 days     None        Dose from last 7 days     Date/Time Dose (mg)    01/04/18 1400  10        Average Dose (mg): 10  Significant Interactions: Antibiotics, Antiplatelet Medications, Aspirin, Statin  Bridge Therapy: Yes     HPI: 61 yo male admitted 12/28/17 for diabetic foot ulcer with osteomyelitis s/p surgical debridement is chronically anticoagulated outpatient with warfarin therapy for h/o DVT, PMH of PVD and CAD (DAPT w/ASA, Plavix). Warfarin held since admission. Bridging back to therapeutic INR with Lovenox 1mg/kg. Per medication reconciliation, patient takes warfarin 10mg po daily.     Assessment: INR is SUBtherapeutic.     Potential drug-drug interactions identified with acute inpatient medications: Antibiotics and Naproxen   Potential drug-drug interactions identified with chronic home medications: Aspirin, Plavix, and Atorvastatin which are established interactions.     Plan:  Resume usual home dosing regimen with daily INR monitoring until re-stabilized. Recommend discontinuation of Naproxen.      Education Material Provided?: No (Chronic warfarin patient)    Pharmacist suggested discharge dosing: Warfarin 10mg po daily      Jailyn Jackson, PharmD

## 2018-01-04 NOTE — PROGRESS NOTES
Infectious Disease Progress Note    Author: Marianna Vicente M.D. Date & Time of service: 2018  2:35 PM    Chief Complaint:  Diabetic foot infection    Interval History:  -MAXIMUM TEMPERATURE 98.4 WBC 11.7 creatinine 0.85. No new issues overnight  17-MAXIMUM TEMPERATURE 98.4. No new issues overnight  2018-MAXIMUM TEMPERATURE 98.8. No new labs available. Anxious to go home  1/2 AF no PICC yet-tolerated dressing change well  /3 AF got PICC-does not want to go to SNF. Denies SE abx   AF difficulties with placement-eating well  Labs Reviewed, Medications Reviewed, Radiology Reviewed and Wound Reviewed.    Review of Systems:  Review of Systems   Constitutional: Negative for fever.   HENT: Negative for hearing loss.    Respiratory: Negative for cough and shortness of breath.    Cardiovascular: Negative for chest pain.   Gastrointestinal: Negative for abdominal pain, nausea and vomiting.   Genitourinary: Negative for dysuria.   Musculoskeletal: Positive for myalgias.   Neurological: Negative for focal weakness.   All other systems reviewed and are negative.      Hemodynamics:  Temp (24hrs), Av.6 °C (97.8 °F), Min:36.2 °C (97.1 °F), Max:37.2 °C (99 °F)  Temperature: 36.2 °C (97.1 °F)  Pulse  Av.7  Min: 48  Max: 73   Blood Pressure: 121/73       Physical Exam:  Physical Exam   Constitutional: He is oriented to person, place, and time. He appears well-developed and well-nourished. No distress.   HENT:   Head: Normocephalic and atraumatic.   Eyes: EOM are normal. Pupils are equal, round, and reactive to light. No scleral icterus.   Neck: Neck supple.   Cardiovascular: Normal rate and regular rhythm.    No murmur heard.  Pulmonary/Chest: Effort normal. No respiratory distress. He has no wheezes. He has no rales.   Abdominal: Soft. He exhibits no distension. There is no tenderness.   Musculoskeletal: He exhibits edema.   LUE PICC  Right foot dressed   Neurological: He is alert and oriented to  person, place, and time.   Skin: No erythema.   Nursing note and vitals reviewed.      Meds:    Current Facility-Administered Medications:   •  MD ALERT... warfarin  •  warfarin  •  insulin glargine  •  naproxen  •  oxycodone immediate-release **OR** oxycodone immediate-release  •  PICC Line Insertion has been implemented **AND** May use Lidocaine 1% not to exceed 3 mls for local at insertion site **AND** NOTIFY MD **AND** Tip to dwell in the superior vena cava **AND** Do not use PICC Line until placement verified by Chest X Ray **AND** DX-CHEST-FOR PICC LINE Perform procedure in: PICC Room **AND** If radiologist reading of chest X-ray states any of the following the PICC should be used **AND** Further evaluation of the PICC placement can be retrieved from X-Ray and Imaging **AND** Blood draws through PICC line; draws by RN only **AND** FLUSHING GUIDELINES WHEN IN USE **AND** normal saline PF **AND** FLUSHING GUIDELINES WHEN NOT IN USE **AND** DRESSING MAINTENANCE **AND** Change needleless pressure ports and IV tubing every 72 hours per hospital policy **AND** TUBING **AND** If there is an MD order to remove the PICC line, any RN may remove the PICC line **AND** [] PATIENT EDUCATION MATERIALS **AND** NURSING COMMUNICATION  •  DAPTOmycin  •  aspirin EC  •  atenolol  •  atorvastatin  •  clopidogrel  •  enoxaparin  •  furosemide  •  gabapentin  •  isosorbide dinitrate  •  NIFEdipine SR  •  omeprazole  •  pentoxifylline CR  •  senna-docusate **AND** polyethylene glycol/lytes **AND** magnesium hydroxide **AND** bisacodyl  •  Respiratory Care per Protocol  •  acetaminophen  •  insulin regular **AND** Accu-Chek ACHS **AND** NOTIFY MD and PharmD **AND** glucose 4 g **AND** dextrose 50%  •  ondansetron  •  ondansetron  •  promethazine  •  promethazine  •  prochlorperazine    Labs:  Recent Labs      18   0244   WBC  8.7   RBC  3.80*   HEMOGLOBIN  10.6*   HEMATOCRIT  32.7*   MCV  86.1   MCH  27.9   RDW  45.8    PLATELETCT  174   MPV  9.7     Recent Labs      18   0245   SODIUM  134*   POTASSIUM  3.7   CHLORIDE  105   CO2  24   GLUCOSE  175*   BUN  12   CPKTOTAL  26     Recent Labs      18   0245   CREATININE  0.74       Imaging:  Dx-foot-complete 3+ Right    Result Date: 2017 1:11 PM HISTORY/REASON FOR EXAM:  Atraumatic Pain/Swelling/Deformity. TECHNIQUE/EXAM DESCRIPTION:  3 views RIGHT foot. COMPARISON:  None. FINDINGS:  There is soft tissue swelling and soft tissue gas adjacent to the distal right fourth metatarsal and right fourth toe. There is destruction of the distal right fourth metatarsal and base of the proximal phalanx. There may be erosive changes involving the margins of the fifth MTP joint. No other destructive lesions no acute fracture identified.     Distal lateral soft tissue swelling and gas consistent with infection. Destruction of the distal fourth metatarsal base of the proximal phalanx consistent with osteomyelitis. Possible osteomyelitis involving the distal fifth metatarsal base of the fifth proximal phalanx.    Le Art Duplx/imag    Result Date: 2017  Lower Extremity  Arterial Duplex Report  Vascular Laboratory  CONCLUSIONS  BAILEY FARNSWORTH  Exam Date:     2017 10:50  Room #:     Inpatient  Priority:     Routine  Ht (in):             Wt (lb):  Ordering Physician:        CORBIN MENDENHALL  Referring Physician:       CORBIN MENDENHALL  Sonographer:               Steffi Boyle RVT  Study Type:                Complete Bilateral  Technical Quality:         Adequate  Age:    62    Gender:     M  MRN:    [id number]  :    1955      BSA:  Indications:     Claudication  CPT Codes:       91503  ICD Codes:       I70.213  History:         Unhealing wound on ball of right foot x4 months. History of                   diabetes.  Limitations:                RIGHT  Waveform        Peak Systolic Velocity (cm/s)                  Prox    Prox-Mid  Mid    Mid-Dist  Distal   Triphasic                         139                      CFA  Biphasic        121                                        PFA  Triphasic       164               116              85      SFA  Triphasic                         78                       POP  Bi, non-        155                                139     AT  reversed  Monophasic      19                                 17      PT  Absent          0                                  41      SHANNON                LEFT  Waveform        Peak Systolic Velocity (cm/s)                  Prox    Prox-Mid  Mid    Mid-Dist  Distal  Triphasic                         141                      CFA  Triphasic       120                                        PFA  Triphasic       129               98               70      SFA  Triphasic                         55                       POP  Bi, non-        59                                 78      AT  reversed  Bi, non-        65                                 23      PT  reversed  Absent          75                                 0       SHANNON  FINDINGS  Right.  Diffuse plaque is seen in the common femoral, profunda femoral, femoral,  and popliteal arteries without evidence of hemodynamically significant  stenosis.  No flow can be demonstrated in the proximal peroneal artery.  Waveforms at the posterior tibial are monophasic with severly reduced  amplitude.  Waveforms at the anterior tibial artery are biphasic.  Left.  Diffuse plaque is seen in the common femoral, profunda femoral, femoral,  and popliteal arteries without evidence of hemodynamically significant  stenosis.  No flow can be demonstrated in the distal peroneal artery.  Waveforms at the posterior tibial and anterior tibial biphasic.     Le Art/katja    Result Date: 2017   Vascular Laboratory  Conclusions  BAILEY FARNSWORTH  Age:    62    Gender:     M  MRN:    [id number]  :    1955      BSA:  Exam Date:     2017 12:43  Room #:     Inpatient  Priority:      Routine  Ht (in):             Wt (lb):  Ordering Physician:        CORBIN MENDENHALL  Referring Physician:       CORBIN MENDENHALL  Sonographer:               Steffi Boyle RVT  Study Type:                Limited Bilateral  Technical Quality:         Adequate  Indications:     Claudication  CPT Codes:       90429  ICD Codes:       I70.213  History:         Unhealing wound on ball of right foot. Diabetes mellitus.  Limitations:     IV in left arm.                 RIGHT  Waveform            Systolic BPs (mmHg)                             112           Brachial  Triphasic                                Common Femoral  Monophasic                 76            Posterior Tibial  Bi, non-                   118           Dorsalis Pedis  reversed                                           Peroneal                             1.05          TATY                                           TBI                       LEFT  Waveform        Systolic BPs (mmHg)                                           Brachial  Triphasic                                Common Femoral  Bi, non-                   0             Posterior Tibial  reversed  Bi, non-                   100           Dorsalis Pedis  reversed                                           Peroneal                             0.89          TATY                                           TBI  Findings  Right.  Ankle brachial index is 1.05.  Doppler waveform of the common femoral and popliteal arteries are of high  amplitude and triphasic.  Doppler waveforms of the posterior tibial at the ankle are monophasic.  Doppler waveforms at the ankle are brisk and biphasic.  Left.  Ankle brachial index is 0.89 for the dorsalis pedis artery.  Ankle brachial index for the posterior tibial artery is non-compressible.  Doppler waveform of the common femoral artery is of high amplitude and  triphasic.  Doppler waveforms at the ankle are brisk and biphasic.  Arterial duplex scan was performed in  "accordance with lower extremity  arterial evaluation protocol - see separate report.       Micro:  Results     Procedure Component Value Units Date/Time    BLOOD CULTURE [[id number]] Collected:  12/29/17 1305    Order Status:  Completed Specimen:  Blood from Peripheral Updated:  01/03/18 1500     Significant Indicator NEG     Source BLD     Site PERIPHERAL     Blood Culture No growth after 5 days of incubation.    Narrative:       Per Hospital Policy: Only change Specimen Src: to \"Line\" if  specified by physician order.    BLOOD CULTURE [[id number]] Collected:  12/29/17 1304    Order Status:  Completed Specimen:  Blood from Peripheral Updated:  01/03/18 1500     Significant Indicator NEG     Source BLD     Site PERIPHERAL     Blood Culture No growth after 5 days of incubation.    Narrative:       Per Hospital Policy: Only change Specimen Src: to \"Line\" if  specified by physician order.    CULTURE TISSUE W/ GRM STAIN [[id number]]  (Abnormal)  (Susceptibility) Collected:  12/29/17 1736    Order Status:  Completed Specimen:  Tissue Updated:  01/02/18 0909     Significant Indicator POS (POS)     Source TISS     Site 5th Metatarsal Head     Tissue Culture -- (A)     Growth noted after further incubation,see below for  organism identification.       Gram Stain Result --     Rare WBCs.  No organisms seen.       Tissue Culture -- (A)     Staphylococcus epidermidis  Rare growth       Tissue Culture -- (A)     Diphtheroids  Rare growth      Culture & Susceptibility     STAPHYLOCOCCUS EPIDERMIDIS     Antibiotic Sensitivity Microscan Unit Status    Ampicillin/sulbactam Resistant 16/8 mcg/mL Final    Clindamycin Sensitive <=0.5 mcg/mL Final    Daptomycin Sensitive <=0.5 mcg/mL Final    Erythromycin Resistant >4 mcg/mL Final    Moxifloxacin Resistant >4 mcg/mL Final    Oxacillin Resistant >2 mcg/mL Final    Penicillin Resistant >8 mcg/mL Final    Tetracycline Resistant >8 mcg/mL Final    Trimeth/Sulfa Resistant >2/38 mcg/mL Final    " Vancomycin Sensitive 2 mcg/mL Final                       ANAEROBIC CULTURE [[id number]] Collected:  12/29/17 1736    Order Status:  Completed Specimen:  Tissue Updated:  01/02/18 0909     Significant Indicator NEG     Source TISS     Site 5th Metatarsal Head     Anaerobic Culture, Culture Res No Anaerobes isolated.    CULTURE TISSUE W/ GRM STAIN [[id number]]  (Abnormal)  (Susceptibility) Collected:  12/29/17 1730    Order Status:  Completed Specimen:  Tissue Updated:  01/02/18 0909     Gram Stain Result --     Few WBCs.  No organisms seen.       Significant Indicator POS (POS)     Source TISS     Site 4th Metatarsal Head     Tissue Culture -- (A)     Growth noted after further incubation,see below for  organism identification.       Tissue Culture -- (A)     Diphtheroids  Light growth       Tissue Culture -- (A)     Staphylococcus epidermidis  Light growth      Culture & Susceptibility     STAPHYLOCOCCUS EPIDERMIDIS     Antibiotic Sensitivity Microscan Unit Status    Ampicillin/sulbactam Resistant <=8/4 mcg/mL Final    Clindamycin Sensitive <=0.5 mcg/mL Final    Daptomycin Sensitive <=0.5 mcg/mL Final    Erythromycin Resistant >4 mcg/mL Final    Moxifloxacin Resistant >4 mcg/mL Final    Oxacillin Resistant >2 mcg/mL Final    Penicillin Resistant >8 mcg/mL Final    Tetracycline Resistant >8 mcg/mL Final    Trimeth/Sulfa Resistant >2/38 mcg/mL Final    Vancomycin Sensitive 2 mcg/mL Final                       ANAEROBIC CULTURE [[id number]] Collected:  12/29/17 1730    Order Status:  Completed Specimen:  Tissue Updated:  01/02/18 0909     Significant Indicator NEG     Source TISS     Site 4th Metatarsal Head     Anaerobic Culture, Culture Res No Anaerobes isolated.    GRAM STAIN [[id number]] Collected:  12/29/17 1736    Order Status:  Completed Specimen:  Tissue Updated:  12/30/17 0748     Significant Indicator .     Source TISS     Site 5th Metatarsal Head     Gram Stain Result --     Rare WBCs.  No organisms seen.       GRAM STAIN [[id number]] Collected:  12/29/17 2930    Order Status:  Completed Specimen:  Tissue Updated:  12/30/17 0597     Significant Indicator .     Source TISS     Site 4th Metatarsal Head     Gram Stain Result --     Few WBCs.  No organisms seen.      CULTURE WOUND W/ GRAM STAIN [[id number]]     Order Status:  No result Specimen:  Wound from Right Foot           Assessment:  Active Hospital Problems    Diagnosis   • *Osteomyelitis of right foot (CMS-HCC) [M86.9]   • Chronic anticoagulation [Z79.01]   • CHF (congestive heart failure) (CMS-HCC) [I50.9]   • COPD (chronic obstructive pulmonary disease) (CMS-HCC) [J44.9]   • Coronary artery disease [I25.10]   • DM (diabetes mellitus) (CMS-HCC) [E11.9]   • Essential hypertension [I10]   • PAOD (peripheral arterial occlusive disease) (CMS-HCC) [I77.9]       Plan:  Diabetic foot ulcer with osteomyelitis  Afebrile  No leukocytosis  Status post I&D and right fourth and fifth metatarsal head and proximal phalanx excision on 12/29/17  Cultures are Coagulase negative staph and diphtheroids  Continue daptomycin  Aim  for 6 weeks post-op  Stop date 2/9/2018    Diabetes mellitus  Keep BS under 150 to help control current infection

## 2018-01-04 NOTE — DISCHARGE PLANNING
[facility] stated that the RN is still reviewing.  Lesly Pascal declined due to no beds. They are willing to review him next week if still inpatient.

## 2018-01-05 LAB
GLUCOSE BLD-MCNC: 175 MG/DL (ref 65–99)
GLUCOSE BLD-MCNC: 209 MG/DL (ref 65–99)
GLUCOSE BLD-MCNC: 214 MG/DL (ref 65–99)
GLUCOSE BLD-MCNC: 228 MG/DL (ref 65–99)
INR PPP: 1.03 (ref 0.87–1.13)
PROTHROMBIN TIME: 13.2 SEC (ref 12–14.6)

## 2018-01-05 PROCEDURE — A9270 NON-COVERED ITEM OR SERVICE: HCPCS | Performed by: INTERNAL MEDICINE

## 2018-01-05 PROCEDURE — 700111 HCHG RX REV CODE 636 W/ 250 OVERRIDE (IP): Mod: JG | Performed by: INTERNAL MEDICINE

## 2018-01-05 PROCEDURE — 99232 SBSQ HOSP IP/OBS MODERATE 35: CPT | Performed by: INTERNAL MEDICINE

## 2018-01-05 PROCEDURE — G8980 MOBILITY D/C STATUS: HCPCS | Mod: CH

## 2018-01-05 PROCEDURE — 700111 HCHG RX REV CODE 636 W/ 250 OVERRIDE (IP): Performed by: INTERNAL MEDICINE

## 2018-01-05 PROCEDURE — 82962 GLUCOSE BLOOD TEST: CPT | Mod: 91

## 2018-01-05 PROCEDURE — 700102 HCHG RX REV CODE 250 W/ 637 OVERRIDE(OP): Performed by: INTERNAL MEDICINE

## 2018-01-05 PROCEDURE — G8979 MOBILITY GOAL STATUS: HCPCS | Mod: CI

## 2018-01-05 PROCEDURE — 97116 GAIT TRAINING THERAPY: CPT

## 2018-01-05 PROCEDURE — 770006 HCHG ROOM/CARE - MED/SURG/GYN SEMI*

## 2018-01-05 PROCEDURE — 97530 THERAPEUTIC ACTIVITIES: CPT

## 2018-01-05 PROCEDURE — 85610 PROTHROMBIN TIME: CPT

## 2018-01-05 RX ORDER — INSULIN GLARGINE 100 [IU]/ML
30 INJECTION, SOLUTION SUBCUTANEOUS EVERY EVENING
Status: DISCONTINUED | OUTPATIENT
Start: 2018-01-05 | End: 2018-01-07

## 2018-01-05 RX ORDER — WARFARIN SODIUM 10 MG/1
10 TABLET ORAL
Status: DISCONTINUED | OUTPATIENT
Start: 2018-01-06 | End: 2018-01-09

## 2018-01-05 RX ORDER — WARFARIN SODIUM 7.5 MG/1
15 TABLET ORAL
Status: COMPLETED | OUTPATIENT
Start: 2018-01-05 | End: 2018-01-05

## 2018-01-05 RX ADMIN — ENOXAPARIN SODIUM 100 MG: 100 INJECTION SUBCUTANEOUS at 08:18

## 2018-01-05 RX ADMIN — WARFARIN SODIUM 15 MG: 7.5 TABLET ORAL at 17:06

## 2018-01-05 RX ADMIN — STANDARDIZED SENNA CONCENTRATE AND DOCUSATE SODIUM 2 TABLET: 8.6; 5 TABLET, FILM COATED ORAL at 21:00

## 2018-01-05 RX ADMIN — INSULIN HUMAN 1 UNITS: 100 INJECTION, SOLUTION PARENTERAL at 05:56

## 2018-01-05 RX ADMIN — OXYCODONE HYDROCHLORIDE 10 MG: 10 TABLET ORAL at 21:28

## 2018-01-05 RX ADMIN — DAPTOMYCIN 600 MG: 500 INJECTION, POWDER, LYOPHILIZED, FOR SOLUTION INTRAVENOUS at 15:23

## 2018-01-05 RX ADMIN — FUROSEMIDE 10 MG: 20 TABLET ORAL at 08:19

## 2018-01-05 RX ADMIN — INSULIN GLARGINE 30 UNITS: 100 INJECTION, SOLUTION SUBCUTANEOUS at 21:28

## 2018-01-05 RX ADMIN — PENTOXIFYLLINE 400 MG: 400 TABLET, FILM COATED, EXTENDED RELEASE ORAL at 17:06

## 2018-01-05 RX ADMIN — ENOXAPARIN SODIUM 100 MG: 100 INJECTION SUBCUTANEOUS at 21:28

## 2018-01-05 RX ADMIN — ATENOLOL 50 MG: 50 TABLET ORAL at 15:23

## 2018-01-05 RX ADMIN — ATORVASTATIN CALCIUM 20 MG: 20 TABLET, FILM COATED ORAL at 21:27

## 2018-01-05 RX ADMIN — OMEPRAZOLE 20 MG: 20 CAPSULE, DELAYED RELEASE ORAL at 08:19

## 2018-01-05 RX ADMIN — OXYCODONE HYDROCHLORIDE 10 MG: 10 TABLET ORAL at 12:31

## 2018-01-05 RX ADMIN — NIFEDIPINE 30 MG: 30 TABLET, FILM COATED, EXTENDED RELEASE ORAL at 08:19

## 2018-01-05 RX ADMIN — OXYCODONE HYDROCHLORIDE 10 MG: 10 TABLET ORAL at 18:33

## 2018-01-05 RX ADMIN — PENTOXIFYLLINE 400 MG: 400 TABLET, FILM COATED, EXTENDED RELEASE ORAL at 11:42

## 2018-01-05 RX ADMIN — ISOSORBIDE DINITRATE 20 MG: 10 TABLET ORAL at 08:19

## 2018-01-05 RX ADMIN — GABAPENTIN 100 MG: 100 CAPSULE ORAL at 21:28

## 2018-01-05 RX ADMIN — ACETAMINOPHEN 650 MG: 325 TABLET, FILM COATED ORAL at 21:27

## 2018-01-05 RX ADMIN — INSULIN HUMAN 2 UNITS: 100 INJECTION, SOLUTION PARENTERAL at 11:33

## 2018-01-05 RX ADMIN — ATENOLOL 50 MG: 50 TABLET ORAL at 08:18

## 2018-01-05 RX ADMIN — ASPIRIN 81 MG: 81 TABLET, COATED ORAL at 08:19

## 2018-01-05 RX ADMIN — INSULIN HUMAN 2 UNITS: 100 INJECTION, SOLUTION PARENTERAL at 21:28

## 2018-01-05 RX ADMIN — OXYCODONE HYDROCHLORIDE 10 MG: 10 TABLET ORAL at 15:27

## 2018-01-05 RX ADMIN — ATENOLOL 50 MG: 50 TABLET ORAL at 21:00

## 2018-01-05 RX ADMIN — INSULIN HUMAN 2 UNITS: 100 INJECTION, SOLUTION PARENTERAL at 16:39

## 2018-01-05 RX ADMIN — STANDARDIZED SENNA CONCENTRATE AND DOCUSATE SODIUM 2 TABLET: 8.6; 5 TABLET, FILM COATED ORAL at 08:18

## 2018-01-05 RX ADMIN — CLOPIDOGREL 75 MG: 75 TABLET, FILM COATED ORAL at 21:27

## 2018-01-05 RX ADMIN — GABAPENTIN 100 MG: 100 CAPSULE ORAL at 15:23

## 2018-01-05 RX ADMIN — PENTOXIFYLLINE 400 MG: 400 TABLET, FILM COATED, EXTENDED RELEASE ORAL at 08:20

## 2018-01-05 RX ADMIN — GABAPENTIN 100 MG: 100 CAPSULE ORAL at 08:19

## 2018-01-05 ASSESSMENT — GAIT ASSESSMENTS
DISTANCE (FEET): 500
DEVIATION: STEP TO
GAIT LEVEL OF ASSIST: MODIFIED INDEPENDENT
ASSISTIVE DEVICE: FRONT WHEEL WALKER

## 2018-01-05 ASSESSMENT — PAIN SCALES - GENERAL
PAINLEVEL_OUTOF10: 1
PAINLEVEL_OUTOF10: 4
PAINLEVEL_OUTOF10: 5
PAINLEVEL_OUTOF10: 4
PAINLEVEL_OUTOF10: 5
PAINLEVEL_OUTOF10: 2

## 2018-01-05 ASSESSMENT — ENCOUNTER SYMPTOMS
MYALGIAS: 1
FOCAL WEAKNESS: 0
SHORTNESS OF BREATH: 0
COUGH: 0
FEVER: 0
NAUSEA: 0
ABDOMINAL PAIN: 0
VOMITING: 0

## 2018-01-05 ASSESSMENT — COGNITIVE AND FUNCTIONAL STATUS - GENERAL
MOBILITY SCORE: 23
CLIMB 3 TO 5 STEPS WITH RAILING: A LITTLE
SUGGESTED CMS G CODE MODIFIER MOBILITY: CI

## 2018-01-05 ASSESSMENT — LIFESTYLE VARIABLES: DO YOU DRINK ALCOHOL: NO

## 2018-01-05 NOTE — PROGRESS NOTES
[facility] Hospitalist Progress Note    Date of Service: 2018    Chief Complaint  62 y.o. male with CAD, COPD, CHF, T2DM, admitted 2017 with diabetic right foot ulcer and osteomyelitis. S/p surgical debridement, and excision of 4th, 5th MT head, 4th and 5th proximal phalanx (partial). Cultures grew CoNS and diphtheroids. On antibiotics, changed to daptomycin x 6 weeks by ID.  PICC line placed. Insulin adjusted for better BG control.     Interval Problem Update  2018 - uneventful night. VSS. Afebrile. Saturating well on RA.  . INR 1.03.    > Seen and examined. Doing well. Pain well controlled. No complaints. No CP, SOB, nausea, vomiting, +BM.     Consultants/Specialty  ID  LPS  Ortho     Disposition  Monitor on the medical floors. Pending SNF placement for prolonged IV antibiotics.      ROS    Pertinent positives/negatives as mentioned above.     A complete review of systems was done. All other systems were negative.      Physical Exam  Laboratory/Imaging   Hemodynamics  Temp (24hrs), Av.6 °C (97.8 °F), Min:36.2 °C (97.2 °F), Max:36.8 °C (98.2 °F)   Temperature: 36.2 °C (97.2 °F)  Pulse  Av.2  Min: 48  Max: 73    Blood Pressure: 120/74      Respiratory      Respiration: 16, Pulse Oximetry: 92 %        RUL Breath Sounds: Clear, RML Breath Sounds: Clear, RLL Breath Sounds: Diminished, NATASHA Breath Sounds: Clear, LLL Breath Sounds: Diminished    Fluids    Intake/Output Summary (Last 24 hours) at 18 0714  Last data filed at 18 2300   Gross per 24 hour   Intake             1300 ml   Output              700 ml   Net              600 ml       Nutrition  Orders Placed This Encounter   Procedures   • DIET ORDER     Standing Status:   Standing     Number of Occurrences:   1     Order Specific Question:   Diet:     Answer:   Diabetic [3]     Order Specific Question:   Diet:     Answer:   Cardiac [6]     Physical Exam   Constitutional: He is oriented to person, place, and time. He appears  well-developed. No distress.   Obese.   HENT:   Head: Normocephalic and atraumatic.   Mouth/Throat: Oropharynx is clear and moist. No oropharyngeal exudate.   Eyes: EOM are normal. Pupils are equal, round, and reactive to light. Right eye exhibits no discharge. Left eye exhibits no discharge. No scleral icterus.   Neck: Normal range of motion. Neck supple. No thyromegaly present.   Cardiovascular: Normal rate and regular rhythm.  Exam reveals no gallop and no friction rub.    No murmur heard.  Pulmonary/Chest: Effort normal and breath sounds normal. He has no wheezes. He has no rales. He exhibits no tenderness.   Abdominal: Soft. Bowel sounds are normal. There is no tenderness. There is no rebound and no guarding.   Musculoskeletal: Normal range of motion. He exhibits no edema or tenderness (very minimal tenderness).   right leg in boot.  (+) amputated toes on right foot   Lymphadenopathy:     He has no cervical adenopathy.   Neurological: He is alert and oriented to person, place, and time. No cranial nerve deficit. Coordination normal.   Skin: Skin is warm and dry. No rash noted. He is not diaphoretic. No erythema.   Psychiatric: His behavior is normal. Thought content normal.   AOx3, in good spirits. Mood an affect normal.    Vitals reviewed.              Recent Labs      01/05/18   0358   INR  1.03                  Assessment/Plan     Osteomyelitis of right foot (CMS-[facility])- (present on admission)   Assessment & Plan    - S/p toe amputations.   - pending SNF placement for long term (6 weeks) of IV daptomycin per ID. CM/SW on-board working on placement.   - continue wound care per LPS.   - Continue pain control with PRN naproxen, and PO oxycodone.           Acquired circulating anticoagulants (CMS-HCC)- (present on admission)   Assessment & Plan    - for recurrent LE DVTs.   - continue coumadin per pharmacy dosing, with lovenox bridge until INR >2.         Chronic CHF (CMS-HCC)- (present on admission)   Assessment  & Plan    - compensated.   - continue home dose PO lasix, plavix, statin, beta blocker, nitrates.         DM (diabetes mellitus) (CMS-HCC)- (present on admission)   Assessment & Plan    - still with room to improve BG control.   - increase lantus to 30 units HS. Goal BG<150.   - Continue SSI. Accuchecks AC and HS. Continue to hold OHA for now while in-house.           COPD (chronic obstructive pulmonary disease) (CMS-HCC)- (present on admission)   Assessment & Plan    - not in acute exacerbation.   - continue RT protocol, PRN O2.         Coronary artery disease- (present on admission)   Assessment & Plan    - stable. No ACS. Patient had remote history of stent, states he's only on pavix now and not ASA.   - continue plavix, statin, isordil,  Beta blocker.         Essential hypertension- (present on admission)   Assessment & Plan    - Well-controlled.  - continue outpatient blood pressure medication (nifedipine, isordil, atenolol, lasix).        PAOD (peripheral arterial occlusive disease) (CMS-HCC)- (present on admission)   Assessment & Plan    - Continue plavix, and statin.             Reviewed items::  Labs reviewed, Radiology images reviewed and Medications reviewed  Dorsey catheter::  No Dorsey  DVT prophylaxis pharmacological::  Enoxaparin (Lovenox)  Antibiotics:  Treating active infection/contamination beyond 24 hours perioperative coverage

## 2018-01-05 NOTE — CARE PLAN
Problem: Knowledge Deficit  Goal: Knowledge of disease process/condition, treatment plan, diagnostic tests, and medications will improve    Intervention: Explain information regarding disease process/condition, treatment plan, diagnostic tests, and medications and document in education  PICC care, IV antibiotics, PT/OT      Problem: Pain Management  Goal: Pain level will decrease to patient's comfort goal    Intervention: Follow pain managment plan developed in collaboration with patient and Interdisciplinary Team  Prn meds per MAR

## 2018-01-05 NOTE — PROGRESS NOTES
Aaox4, up steady with fww and RLE boot, dressing changed this am, POC discussed, long term IV antibiotic, placement coordination.

## 2018-01-05 NOTE — PROGRESS NOTES
Inpatient Anticoagulation Service Note    Date: 1/5/2018  Reason for Anticoagulation: Deep Vein Thrombosis        Hemoglobin Value: (!) 10.6  Hematocrit Value: (!) 32.7  Lab Platelet Value: 174  Target INR: 2.0 to 3.0    INR from last 7 days     Date/Time INR Value    01/05/18 0358  1.03        Dose from last 7 days     Date/Time Dose (mg)    01/05/18 1200  15    01/04/18 1400  10        Average Dose (mg): 10  Significant Interactions: Antibiotics, Antiplatelet Medications, Statin  Bridge Therapy: Yes     HPI: 61 yo male admitted 12/28/17 for diabetic foot ulcer with osteomyelitis s/p surgical debridement is chronically anticoagulated outpatient with warfarin therapy for h/o DVT, PMH of PVD, CAD, MI w/stent placement (Plavix). Warfarin held since admission. Bridging back to therapeutic INR with Lovenox 1mg/kg. Per medication reconciliation, patient takes warfarin 10mg po daily.      Assessment: INR remains SUBtherapeutic with trend downward following first dose of warfarin inpatient re-initiation last night. Warfarin held since admission.      Potential drug-drug interactions identified with acute inpatient medications: Antibiotics  Potential drug-drug interactions identified with chronic home medications: Plavix, and Atorvastatin which are established interactions.      Plan:  Give warfarin 15mg po x one bolus dose tonight then resume warfarin 10mg po daily with continued daily INR monitoring until re-stabilized on warfarin regimen. Discontinued Naproxen, counseled patient on discontinuing OTC use at home as well. Discontinued aspirin, continuing concomitant Plavix for anti-platelet therapy s/p coronary stent placement (in [place] years ago per patient report).      Education Material Provided?: No (Chronic warfarin patient)     Pharmacist suggested discharge dosing: Warfarin 10mg po daily      Jailyn Jackson, PharmD

## 2018-01-05 NOTE — PROGRESS NOTES
"Infectious Disease Progress Note    Author: Marianna Vicente M.D. Date & Time of service: 2018  1:51 PM    Chief Complaint:  Diabetic foot infection    Interval History:  -MAXIMUM TEMPERATURE 98.4 WBC 11.7 creatinine 0.85. No new issues overnight  17-MAXIMUM TEMPERATURE 98.4. No new issues overnight  2018-MAXIMUM TEMPERATURE 98.8. No new labs available. Anxious to go home   AF no PICC yet-tolerated dressing change well  1/3 AF got PICC-does not want to go to SNF. Denies SE abx   AF difficulties with placement-eating well   AF no complaints States \"I am trying to be pleasant\". Using boot  Labs Reviewed, Medications Reviewed, Radiology Reviewed and Wound Reviewed.    Review of Systems:  Review of Systems   Constitutional: Negative for fever.   HENT: Negative for hearing loss.    Respiratory: Negative for cough and shortness of breath.    Cardiovascular: Negative for chest pain.   Gastrointestinal: Negative for abdominal pain, nausea and vomiting.   Genitourinary: Negative for dysuria.   Musculoskeletal: Positive for myalgias.   Neurological: Negative for focal weakness.   All other systems reviewed and are negative.      Hemodynamics:  Temp (24hrs), Av.6 °C (97.8 °F), Min:36.2 °C (97.2 °F), Max:36.8 °C (98.2 °F)  Temperature: 36.4 °C (97.5 °F)  Pulse  Av.2  Min: 48  Max: 73   Blood Pressure: 129/75       Physical Exam:  Physical Exam   Constitutional: He is oriented to person, place, and time. He appears well-developed and well-nourished. No distress.   HENT:   Head: Normocephalic and atraumatic.   Eyes: EOM are normal. Pupils are equal, round, and reactive to light. No scleral icterus.   Neck: Neck supple.   Cardiovascular: Normal rate and regular rhythm.    No murmur heard.  Pulmonary/Chest: Effort normal. No respiratory distress. He has no wheezes. He has no rales.   Abdominal: Soft. He exhibits no distension. There is no tenderness.   Musculoskeletal: He exhibits edema. "   ALEKSANDR PICC  Right foot dressed   Neurological: He is alert and oriented to person, place, and time.   Skin: No erythema.   Nursing note and vitals reviewed.      Meds:    Current Facility-Administered Medications:   •  insulin glargine  •  [START ON 2018] warfarin  •  warfarin  •  MD ALERT... warfarin  •  oxycodone immediate-release **OR** oxycodone immediate-release  •  PICC Line Insertion has been implemented **AND** May use Lidocaine 1% not to exceed 3 mls for local at insertion site **AND** NOTIFY MD **AND** Tip to dwell in the superior vena cava **AND** Do not use PICC Line until placement verified by Chest X Ray **AND** DX-CHEST-FOR PICC LINE Perform procedure in: PICC Room **AND** If radiologist reading of chest X-ray states any of the following the PICC should be used **AND** Further evaluation of the PICC placement can be retrieved from X-Ray and Imaging **AND** Blood draws through PICC line; draws by RN only **AND** FLUSHING GUIDELINES WHEN IN USE **AND** normal saline PF **AND** FLUSHING GUIDELINES WHEN NOT IN USE **AND** DRESSING MAINTENANCE **AND** Change needleless pressure ports and IV tubing every 72 hours per hospital policy **AND** TUBING **AND** If there is an MD order to remove the PICC line, any RN may remove the PICC line **AND** [] PATIENT EDUCATION MATERIALS **AND** NURSING COMMUNICATION  •  DAPTOmycin  •  atenolol  •  atorvastatin  •  clopidogrel  •  enoxaparin  •  furosemide  •  gabapentin  •  isosorbide dinitrate  •  NIFEdipine SR  •  omeprazole  •  pentoxifylline CR  •  senna-docusate **AND** polyethylene glycol/lytes **AND** magnesium hydroxide **AND** bisacodyl  •  Respiratory Care per Protocol  •  acetaminophen  •  insulin regular **AND** Accu-Chek ACHS **AND** NOTIFY MD and PharmD **AND** glucose 4 g **AND** dextrose 50%  •  ondansetron  •  ondansetron  •  promethazine  •  promethazine  •  prochlorperazine    Labs:  No results for input(s): WBC, RBC, HEMOGLOBIN, HEMATOCRIT,  MCV, MCH, RDW, PLATELETCT, MPV, NEUTSPOLYS, LYMPHOCYTES, MONOCYTES, EOSINOPHILS, BASOPHILS, RBCMORPHOLO in the last 72 hours.  No results for input(s): SODIUM, POTASSIUM, CHLORIDE, CO2, GLUCOSE, BUN, CPKTOTAL in the last 72 hours.  No results for input(s): ALBUMIN, TBILIRUBIN, ALKPHOSPHAT, TOTPROTEIN, ALTSGPT, ASTSGOT, CREATININE in the last 72 hours.    Imaging:  Dx-foot-complete 3+ Right    Result Date: 2017 1:11 PM HISTORY/REASON FOR EXAM:  Atraumatic Pain/Swelling/Deformity. TECHNIQUE/EXAM DESCRIPTION:  3 views RIGHT foot. COMPARISON:  None. FINDINGS:  There is soft tissue swelling and soft tissue gas adjacent to the distal right fourth metatarsal and right fourth toe. There is destruction of the distal right fourth metatarsal and base of the proximal phalanx. There may be erosive changes involving the margins of the fifth MTP joint. No other destructive lesions no acute fracture identified.     Distal lateral soft tissue swelling and gas consistent with infection. Destruction of the distal fourth metatarsal base of the proximal phalanx consistent with osteomyelitis. Possible osteomyelitis involving the distal fifth metatarsal base of the fifth proximal phalanx.    Le Art Duplx/imag    Result Date: 2017  Lower Extremity  Arterial Duplex Report  Vascular Laboratory  CONCLUSIONS  BAILEY FARNSWORTH  Exam Date:     2017 10:50  Room #:     Inpatient  Priority:     Routine  Ht (in):             Wt (lb):  Ordering Physician:        CORBIN MENDENHALL  Referring Physician:       CORBIN MENDENHALL  Sonographer:               Steffi Boyle RVT  Study Type:                Complete Bilateral  Technical Quality:         Adequate  Age:    62    Gender:     M  MRN:    [id number]  :    1955      BSA:  Indications:     Claudication  CPT Codes:       21687  ICD Codes:       I70.213  History:         Unhealing wound on ball of right foot x4 months. History of                   diabetes.  Limitations:                 RIGHT  Waveform        Peak Systolic Velocity (cm/s)                  Prox    Prox-Mid  Mid    Mid-Dist  Distal  Triphasic                         139                      CFA  Biphasic        121                                        PFA  Triphasic       164               116              85      SFA  Triphasic                         78                       POP  Bi, non-        155                                139     AT  reversed  Monophasic      19                                 17      PT  Absent          0                                  41      SHANNON                LEFT  Waveform        Peak Systolic Velocity (cm/s)                  Prox    Prox-Mid  Mid    Mid-Dist  Distal  Triphasic                         141                      CFA  Triphasic       120                                        PFA  Triphasic       129               98               70      SFA  Triphasic                         55                       POP  Bi, non-        59                                 78      AT  reversed  Bi, non-        65                                 23      PT  reversed  Absent          75                                 0       SHANNON  FINDINGS  Right.  Diffuse plaque is seen in the common femoral, profunda femoral, femoral,  and popliteal arteries without evidence of hemodynamically significant  stenosis.  No flow can be demonstrated in the proximal peroneal artery.  Waveforms at the posterior tibial are monophasic with severly reduced  amplitude.  Waveforms at the anterior tibial artery are biphasic.  Left.  Diffuse plaque is seen in the common femoral, profunda femoral, femoral,  and popliteal arteries without evidence of hemodynamically significant  stenosis.  No flow can be demonstrated in the distal peroneal artery.  Waveforms at the posterior tibial and anterior tibial biphasic.     Le Art/katja    Result Date: 12/29/2017   Vascular Laboratory  Conclusions  BAILEY FARNSWORTH  Age:    62    Gender:      M  MRN:    [id number]  :    1955      BSA:  Exam Date:     2017 12:43  Room #:     Inpatient  Priority:     Routine  Ht (in):             Wt (lb):  Ordering Physician:        CORBIN MENDENHALL  Referring Physician:       CORBIN MENDENHALL  Sonographer:               Steffi Boyle RVT  Study Type:                Limited Bilateral  Technical Quality:         Adequate  Indications:     Claudication  CPT Codes:       93077  ICD Codes:       I70.213  History:         Unhealing wound on ball of right foot. Diabetes mellitus.  Limitations:     IV in left arm.                 RIGHT  Waveform            Systolic BPs (mmHg)                             112           Brachial  Triphasic                                Common Femoral  Monophasic                 76            Posterior Tibial  Bi, non-                   118           Dorsalis Pedis  reversed                                           Peroneal                             1.05          TATY                                           TBI                       LEFT  Waveform        Systolic BPs (mmHg)                                           Brachial  Triphasic                                Common Femoral  Bi, non-                   0             Posterior Tibial  reversed  Bi, non-                   100           Dorsalis Pedis  reversed                                           Peroneal                             0.89          TATY                                           TBI  Findings  Right.  Ankle brachial index is 1.05.  Doppler waveform of the common femoral and popliteal arteries are of high  amplitude and triphasic.  Doppler waveforms of the posterior tibial at the ankle are monophasic.  Doppler waveforms at the ankle are brisk and biphasic.  Left.  Ankle brachial index is 0.89 for the dorsalis pedis artery.  Ankle brachial index for the posterior tibial artery is non-compressible.  Doppler waveform of the common femoral artery is of high  "amplitude and  triphasic.  Doppler waveforms at the ankle are brisk and biphasic.  Arterial duplex scan was performed in accordance with lower extremity  arterial evaluation protocol - see separate report.       Micro:  Results     Procedure Component Value Units Date/Time    BLOOD CULTURE [[id number]] Collected:  12/29/17 1305    Order Status:  Completed Specimen:  Blood from Peripheral Updated:  01/03/18 1500     Significant Indicator NEG     Source BLD     Site PERIPHERAL     Blood Culture No growth after 5 days of incubation.    Narrative:       Per Hospital Policy: Only change Specimen Src: to \"Line\" if  specified by physician order.    BLOOD CULTURE [[id number]] Collected:  12/29/17 1304    Order Status:  Completed Specimen:  Blood from Peripheral Updated:  01/03/18 1500     Significant Indicator NEG     Source BLD     Site PERIPHERAL     Blood Culture No growth after 5 days of incubation.    Narrative:       Per Hospital Policy: Only change Specimen Src: to \"Line\" if  specified by physician order.    CULTURE TISSUE W/ GRM STAIN [[id number]]  (Abnormal)  (Susceptibility) Collected:  12/29/17 1736    Order Status:  Completed Specimen:  Tissue Updated:  01/02/18 0909     Significant Indicator POS (POS)     Source TISS     Site 5th Metatarsal Head     Tissue Culture -- (A)     Growth noted after further incubation,see below for  organism identification.       Gram Stain Result --     Rare WBCs.  No organisms seen.       Tissue Culture -- (A)     Staphylococcus epidermidis  Rare growth       Tissue Culture -- (A)     Diphtheroids  Rare growth      Culture & Susceptibility     STAPHYLOCOCCUS EPIDERMIDIS     Antibiotic Sensitivity Microscan Unit Status    Ampicillin/sulbactam Resistant 16/8 mcg/mL Final    Clindamycin Sensitive <=0.5 mcg/mL Final    Daptomycin Sensitive <=0.5 mcg/mL Final    Erythromycin Resistant >4 mcg/mL Final    Moxifloxacin Resistant >4 mcg/mL Final    Oxacillin Resistant >2 mcg/mL Final    " Penicillin Resistant >8 mcg/mL Final    Tetracycline Resistant >8 mcg/mL Final    Trimeth/Sulfa Resistant >2/38 mcg/mL Final    Vancomycin Sensitive 2 mcg/mL Final                       ANAEROBIC CULTURE [[id number]] Collected:  12/29/17 1736    Order Status:  Completed Specimen:  Tissue Updated:  01/02/18 0909     Significant Indicator NEG     Source TISS     Site 5th Metatarsal Head     Anaerobic Culture, Culture Res No Anaerobes isolated.    CULTURE TISSUE W/ GRM STAIN [[id number]]  (Abnormal)  (Susceptibility) Collected:  12/29/17 1730    Order Status:  Completed Specimen:  Tissue Updated:  01/02/18 0909     Gram Stain Result --     Few WBCs.  No organisms seen.       Significant Indicator POS (POS)     Source TISS     Site 4th Metatarsal Head     Tissue Culture -- (A)     Growth noted after further incubation,see below for  organism identification.       Tissue Culture -- (A)     Diphtheroids  Light growth       Tissue Culture -- (A)     Staphylococcus epidermidis  Light growth      Culture & Susceptibility     STAPHYLOCOCCUS EPIDERMIDIS     Antibiotic Sensitivity Microscan Unit Status    Ampicillin/sulbactam Resistant <=8/4 mcg/mL Final    Clindamycin Sensitive <=0.5 mcg/mL Final    Daptomycin Sensitive <=0.5 mcg/mL Final    Erythromycin Resistant >4 mcg/mL Final    Moxifloxacin Resistant >4 mcg/mL Final    Oxacillin Resistant >2 mcg/mL Final    Penicillin Resistant >8 mcg/mL Final    Tetracycline Resistant >8 mcg/mL Final    Trimeth/Sulfa Resistant >2/38 mcg/mL Final    Vancomycin Sensitive 2 mcg/mL Final                       ANAEROBIC CULTURE [[id number]] Collected:  12/29/17 1730    Order Status:  Completed Specimen:  Tissue Updated:  01/02/18 0909     Significant Indicator NEG     Source TISS     Site 4th Metatarsal Head     Anaerobic Culture, Culture Res No Anaerobes isolated.    GRAM STAIN [[id number]] Collected:  12/29/17 1736    Order Status:  Completed Specimen:  Tissue Updated:  12/30/17 0748      Significant Indicator .     Source TISS     Site 5th Metatarsal Head     Gram Stain Result --     Rare WBCs.  No organisms seen.      GRAM STAIN [[id number]] Collected:  12/29/17 0714    Order Status:  Completed Specimen:  Tissue Updated:  12/30/17 0777     Significant Indicator .     Source TISS     Site 4th Metatarsal Head     Gram Stain Result --     Few WBCs.  No organisms seen.            Assessment:  Active Hospital Problems    Diagnosis   • *Osteomyelitis of right foot (CMS-HCC) [M86.9]   • Chronic anticoagulation [Z79.01]   • CHF (congestive heart failure) (CMS-HCC) [I50.9]   • COPD (chronic obstructive pulmonary disease) (CMS-HCC) [J44.9]   • Coronary artery disease [I25.10]   • DM (diabetes mellitus) (CMS-HCC) [E11.9]   • Essential hypertension [I10]   • PAOD (peripheral arterial occlusive disease) (CMS-HCC) [I77.9]       Plan:  Diabetic foot ulcer with osteomyelitis  Afebrile  No leukocytosis  Status post I&D and right fourth and fifth metatarsal head and proximal phalanx excision on 12/29/17  Cultures are MRSE and diphtheroids  Continue daptomycin  Aim  for 6 weeks post-op-can do Zyvox PO last 2 weeks  Stop date 2/9/2018    Diabetes mellitus  Keep BS under 150 to help control current infection

## 2018-01-05 NOTE — PROGRESS NOTES
"LIMB PRESERVATION SERVICE     Patient is a 62 y.o. male with history of Diabetes, Congestive heart failure, Chronic obstructive pulmonary disease, CAD (coronary artery disease), and s/p thrombectomy to LLE in 2016.    Admitted for R foot ulcer with osteomyelitis. Prefers to be referred as female, goes by Trudy.    POD # 6 s/p R foot I &D, R 4th and 5th proximal phalanx partial excisions and Met head excisions, R GSR by Dr. Villegas.      /65   Pulse (!) 48   Temp 36.7 °C (98.1 °F)   Resp 18   Ht 1.753 m (5' 9\")   Wt 98.2 kg (216 lb 7.9 oz)   SpO2 91%   BMI 31.97 kg/m²    Pain controlled. Tender to R calf. Additionally, pt c/o neuropathic pains to RLE  Wearing ariel boot continuously. Reviewed importance to keep wearing boot      DM:  a1c 9.7%  DME has seen pt  Glucose 159      R foot:   R plantar 4th met ulcer: serosang drainage scant amount. periwound callus present.     R dorsal 4th webspace incision:   Sutures well approximated, drainage to distal aspect of incision. Continue to monitor area for dehiscence.   Edema and erythema remains extending proximally.     R calf:   Incision approximated with sutures without induration.   Pt c/o tenderness with palpation to site    Wound and incisions cleaned with NS, applied adhesive foam to calf incision. Filled plantar wound with aquacel ag and covered with non adhesive foam piece. Applied aquacel ag to dorsal incision weaving in between 4th and 5th toes.   Covered foot with kerlix, followed by ace wrap  Replaced ariel boot    PLAN  Dressing changes daily for nursing for RLE. Orders placed  Pt to wear Ariel boot to RLE at all times  HWB RLE with boot on when OOB  IV abx per ID-PICC line pending      D/C plan:  Pt lives in [place]. Believes she is returning home. Will need IV abx and wound care.     Pt to f/u at LPS rounds at wound care clinic for post op check on   1/19/18  [employer] Involved    D/W: pt, RN, Joel Wound RN           "

## 2018-01-05 NOTE — THERAPY
"Physical Therapy Treatment completed.   Bed Mobility:  Supine to Sit: Modified Independent  Transfers: Sit to Stand: Modified Independent  Gait: Level Of Assist: Modified Independent with Front-Wheel Walker       Plan of Care: Will benefit from Physical Therapy 3 times per week  Discharge Recommendations: Equipment: No Equipment Needed. Pt has FWW from previous hospital admit.    See \"Rehab Therapy-Acute\" Patient Summary Report for complete documentation.     Pt is presenting w/ improved independence w/ functional mobility. Pt is at a [place]-SPV level for most mobility. Pt is able to use FWW for ambulation and demonstrates good balance even w/ R boot. Pt has been able to increase ambulation distances and gets up frequently w/ nursing staff. Pt has met all therapy goals and no longer requires skilled acute therapy needs. Discussed w/ PT.  "

## 2018-01-05 NOTE — CARE PLAN
Problem: Safety  Goal: Will remain free from injury    Intervention: Provide assistance with mobility  Pt assisted with adl's. Pt uses call light for assistance.       Problem: Infection  Goal: Will remain free from infection    Intervention: Assess signs and symptoms of infection  Pt's wound incision appears Sutures CDI. Min swelling noted. Otherwise no inflammation noted.

## 2018-01-06 LAB
GLUCOSE BLD-MCNC: 139 MG/DL (ref 65–99)
GLUCOSE BLD-MCNC: 141 MG/DL (ref 65–99)
GLUCOSE BLD-MCNC: 176 MG/DL (ref 65–99)
GLUCOSE BLD-MCNC: 190 MG/DL (ref 65–99)
GLUCOSE BLD-MCNC: 223 MG/DL (ref 65–99)
INR PPP: 1.12 (ref 0.87–1.13)
PROTHROMBIN TIME: 14.1 SEC (ref 12–14.6)

## 2018-01-06 PROCEDURE — 770006 HCHG ROOM/CARE - MED/SURG/GYN SEMI*

## 2018-01-06 PROCEDURE — A9270 NON-COVERED ITEM OR SERVICE: HCPCS | Performed by: INTERNAL MEDICINE

## 2018-01-06 PROCEDURE — 85610 PROTHROMBIN TIME: CPT

## 2018-01-06 PROCEDURE — 82962 GLUCOSE BLOOD TEST: CPT | Mod: 91

## 2018-01-06 PROCEDURE — 700111 HCHG RX REV CODE 636 W/ 250 OVERRIDE (IP): Performed by: INTERNAL MEDICINE

## 2018-01-06 PROCEDURE — 99232 SBSQ HOSP IP/OBS MODERATE 35: CPT | Performed by: INTERNAL MEDICINE

## 2018-01-06 PROCEDURE — 700111 HCHG RX REV CODE 636 W/ 250 OVERRIDE (IP): Mod: JG | Performed by: INTERNAL MEDICINE

## 2018-01-06 PROCEDURE — 700102 HCHG RX REV CODE 250 W/ 637 OVERRIDE(OP): Performed by: INTERNAL MEDICINE

## 2018-01-06 RX ADMIN — ENOXAPARIN SODIUM 100 MG: 100 INJECTION SUBCUTANEOUS at 08:07

## 2018-01-06 RX ADMIN — CLOPIDOGREL 75 MG: 75 TABLET, FILM COATED ORAL at 22:02

## 2018-01-06 RX ADMIN — STANDARDIZED SENNA CONCENTRATE AND DOCUSATE SODIUM 2 TABLET: 8.6; 5 TABLET, FILM COATED ORAL at 08:08

## 2018-01-06 RX ADMIN — PENTOXIFYLLINE 400 MG: 400 TABLET, FILM COATED, EXTENDED RELEASE ORAL at 11:15

## 2018-01-06 RX ADMIN — ATORVASTATIN CALCIUM 20 MG: 20 TABLET, FILM COATED ORAL at 22:02

## 2018-01-06 RX ADMIN — OXYCODONE HYDROCHLORIDE 10 MG: 10 TABLET ORAL at 22:02

## 2018-01-06 RX ADMIN — GABAPENTIN 100 MG: 100 CAPSULE ORAL at 22:02

## 2018-01-06 RX ADMIN — DAPTOMYCIN 600 MG: 500 INJECTION, POWDER, LYOPHILIZED, FOR SOLUTION INTRAVENOUS at 15:24

## 2018-01-06 RX ADMIN — INSULIN HUMAN 2 UNITS: 100 INJECTION, SOLUTION PARENTERAL at 21:57

## 2018-01-06 RX ADMIN — OXYCODONE HYDROCHLORIDE 10 MG: 10 TABLET ORAL at 11:17

## 2018-01-06 RX ADMIN — PENTOXIFYLLINE 400 MG: 400 TABLET, FILM COATED, EXTENDED RELEASE ORAL at 17:18

## 2018-01-06 RX ADMIN — ISOSORBIDE DINITRATE 20 MG: 10 TABLET ORAL at 08:08

## 2018-01-06 RX ADMIN — INSULIN GLARGINE 30 UNITS: 100 INJECTION, SOLUTION SUBCUTANEOUS at 21:57

## 2018-01-06 RX ADMIN — ATENOLOL 50 MG: 50 TABLET ORAL at 08:08

## 2018-01-06 RX ADMIN — WARFARIN SODIUM 10 MG: 10 TABLET ORAL at 17:18

## 2018-01-06 RX ADMIN — GABAPENTIN 100 MG: 100 CAPSULE ORAL at 15:24

## 2018-01-06 RX ADMIN — ENOXAPARIN SODIUM 100 MG: 100 INJECTION SUBCUTANEOUS at 22:01

## 2018-01-06 RX ADMIN — FUROSEMIDE 10 MG: 20 TABLET ORAL at 08:07

## 2018-01-06 RX ADMIN — OXYCODONE HYDROCHLORIDE 10 MG: 10 TABLET ORAL at 08:08

## 2018-01-06 RX ADMIN — NIFEDIPINE 30 MG: 30 TABLET, FILM COATED, EXTENDED RELEASE ORAL at 08:08

## 2018-01-06 RX ADMIN — OXYCODONE HYDROCHLORIDE 10 MG: 10 TABLET ORAL at 17:19

## 2018-01-06 RX ADMIN — PENTOXIFYLLINE 400 MG: 400 TABLET, FILM COATED, EXTENDED RELEASE ORAL at 08:08

## 2018-01-06 RX ADMIN — INSULIN HUMAN 1 UNITS: 100 INJECTION, SOLUTION PARENTERAL at 11:15

## 2018-01-06 RX ADMIN — GABAPENTIN 100 MG: 100 CAPSULE ORAL at 08:08

## 2018-01-06 RX ADMIN — OMEPRAZOLE 20 MG: 20 CAPSULE, DELAYED RELEASE ORAL at 08:07

## 2018-01-06 RX ADMIN — ONDANSETRON 4 MG: 4 TABLET, ORALLY DISINTEGRATING ORAL at 05:40

## 2018-01-06 ASSESSMENT — PAIN SCALES - GENERAL
PAINLEVEL_OUTOF10: 1
PAINLEVEL_OUTOF10: 4
PAINLEVEL_OUTOF10: 5
PAINLEVEL_OUTOF10: 5

## 2018-01-06 ASSESSMENT — ENCOUNTER SYMPTOMS
COUGH: 0
FOCAL WEAKNESS: 0
NAUSEA: 1
ABDOMINAL PAIN: 0
SHORTNESS OF BREATH: 0
FEVER: 0
MYALGIAS: 1
VOMITING: 0

## 2018-01-06 ASSESSMENT — PATIENT HEALTH QUESTIONNAIRE - PHQ9
SUM OF ALL RESPONSES TO PHQ QUESTIONS 1-9: 0
2. FEELING DOWN, DEPRESSED, IRRITABLE, OR HOPELESS: NOT AT ALL
SUM OF ALL RESPONSES TO PHQ9 QUESTIONS 1 AND 2: 0
1. LITTLE INTEREST OR PLEASURE IN DOING THINGS: NOT AT ALL

## 2018-01-06 NOTE — PROGRESS NOTES
"LIMB PRESERVATION SERVICE     Patient is a 62 y.o. male with history of Diabetes, Congestive heart failure, Chronic obstructive pulmonary disease, CAD (coronary artery disease), and s/p thrombectomy to LLE in 2016.    Admitted for R foot ulcer with osteomyelitis. Prefers to be referred as female, goes by Trudy.    POD # 8 s/p R foot I &D, R 4th and 5th proximal phalanx partial excisions and Met head excisions, R GSR by Dr. Villegas.      /76   Pulse (!) 45   Temp 36 °C (96.8 °F)   Resp 17   Ht 1.753 m (5' 9\")   Wt 98.2 kg (216 lb 7.9 oz)   SpO2 97%   BMI 31.97 kg/m²    Pain controlled. Tender to R calf. Additionally, pt c/o neuropathic pains to RLE  Wearing herman boot continuously. Reviewed importance to keep wearing boot  Pt requested to have wound team redo dressings when present.    DM:  a1c 9.7%  DME has seen pt  Glucose 159      R foot:   R plantar 4th met ulcer: serosang drainage moderate amount. periwound callus present.     R dorsal 4th webspace incision:   Sutures well approximated, drainage to distal aspect of incision. Continue to monitor area for dehiscence.   Edema and erythema remains extending proximally.     R calf:   Incision approximated with sutures without induration.   Pt c/o tenderness with palpation to site    Wound and incisions cleaned with NS, applied non adhesive foam to calf incision secured with hypafix tape. Loosely fill plantar wound with aquacel ag strip and covered with non adhesive foam piece and secured with hypafix tape. Applied aquacel ag to dorsal incision/suture covered with non adhesive foam piece and secured with hypafix tape   Covered foot with kerlix, followed by ace wrap  Replaced herman boot    PLAN  Dressing changes daily for nursing for RLE. Orders clarified  Pt to wear [place] boot to RLE at all times  HWB RLE with boot on when OOB  IV abx per ID-PICC line pending      D/C plan:  Pt lives in [place]. Believes she is returning home. Will need IV abx and wound " care.     Pt to f/u at LPS rounds at wound care clinic for post op check on   1/19/18  [employer] Involved    D/W: pt, RN, Joel Wound RN

## 2018-01-06 NOTE — PROGRESS NOTES
C/o light headed, checked FSBS-176, /76 with HR 45 while supine, on atenolol 50mg TID, will advise MD.

## 2018-01-06 NOTE — PROGRESS NOTES
Inpatient Anticoagulation Service Note    Date: 1/6/2018  Reason for Anticoagulation: Deep Vein Thrombosis        Hemoglobin Value: (!) 10.6  Hematocrit Value: (!) 32.7  Lab Platelet Value: 174  Target INR: 2.0 to 3.0    INR from last 7 days     Date/Time INR Value    01/06/18 1014  1.12    01/05/18 0358  1.03        Dose from last 7 days     Date/Time Dose (mg)    01/06/18 1300  10    01/05/18 1200  15    01/04/18 1400  10        Average Dose (mg): 10  Significant Interactions: Antibiotics, Antiplatelet Medications, Proton Pump Inhibitor, Statin  Bridge Therapy: Yes     HPI: 61 yo male admitted 12/28/17 for diabetic foot ulcer with osteomyelitis s/p surgical debridement is chronically anticoagulated outpatient with warfarin therapy for h/o DVT, PMH of PVD, CAD, MI w/stent placement (Plavix). Warfarin held since admission. Bridging back to therapeutic INR with Lovenox 1mg/kg. Per medication reconciliation, patient takes warfarin 10mg po daily.      Assessment: INR remains SUBtherapeutic with minor trend upward today, 1.12 from 1.03, following a bolus dose of warfarin 15mg x one yesterday. Contemplated a second bolus dose, however, effect of 15mg dose will continue to have an effect on INR downstream.      Potential drug-drug interactions identified with acute inpatient medications: Antibiotics  Potential drug-drug interactions identified with chronic home medications: Plavix, and Atorvastatin which are established interactions.      Plan:  Resume usual dosing regimen of warfarin 10mg po daily with daily INR monitoring. Continue Lovenox bridging.   Discontinued Naproxen, counseled patient on discontinuing OTC use at home as well. Discontinued aspirin, continuing concomitant Plavix for anti-platelet therapy s/p coronary stent placement (in [place] years ago per patient report).      Education Material Provided?: No (Chronic warfarin patient)     Pharmacist suggested discharge dosing: Warfarin 10mg po daily      Jailyn  YO Jackson, PharmD

## 2018-01-06 NOTE — CARE PLAN
Problem: Communication  Goal: The ability to communicate needs accurately and effectively will improve  Outcome: PROGRESSING AS EXPECTED  Patient uses call light appropriately to communicate needs and concerns with staff    Problem: Safety  Goal: Will remain free from injury  Outcome: PROGRESSING AS EXPECTED  Patient has remained free from further injury this shift    Problem: Pain Management  Goal: Pain level will decrease to patient's comfort goal  Outcome: PROGRESSING AS EXPECTED  Patient has been able to sleep with current levels of pain this shift    Problem: Mobility  Goal: Risk for activity intolerance will decrease  Outcome: PROGRESSING AS EXPECTED  Patient is able to ambulate without the assistance of staff

## 2018-01-06 NOTE — PROGRESS NOTES
"Infectious Disease Progress Note    Author: Marianna Vicente M.D. Date & Time of service: 2018  1:53 PM    Chief Complaint:  Diabetic foot infection    Interval History:  -MAXIMUM TEMPERATURE 98.4 WBC 11.7 creatinine 0.85. No new issues overnight  17-MAXIMUM TEMPERATURE 98.4. No new issues overnight  2018-MAXIMUM TEMPERATURE 98.8. No new labs available. Anxious to go home   AF no PICC yet-tolerated dressing change well  1/3 AF got PICC-does not want to go to SNF. Denies SE abx   AF difficulties with placement-eating well   AF no complaints States \"I am trying to be pleasant\". Using boot   AF states nausea in the morning but eating full meals  Labs Reviewed, Medications Reviewed, Radiology Reviewed and Wound Reviewed.    Review of Systems:  Review of Systems   Constitutional: Negative for fever.   HENT: Negative for hearing loss.    Respiratory: Negative for cough and shortness of breath.    Cardiovascular: Negative for chest pain.   Gastrointestinal: Positive for nausea. Negative for abdominal pain and vomiting.   Genitourinary: Negative for dysuria.   Musculoskeletal: Positive for myalgias.   Neurological: Negative for focal weakness.   All other systems reviewed and are negative.      Hemodynamics:  Temp (24hrs), Av.4 °C (97.5 °F), Min:36 °C (96.8 °F), Max:37 °C (98.6 °F)  Temperature: 36 °C (96.8 °F)  Pulse  Av.7  Min: 45  Max: 73   Blood Pressure: 113/76       Physical Exam:  Physical Exam   Constitutional: He is oriented to person, place, and time. He appears well-developed and well-nourished. No distress.   HENT:   Head: Normocephalic and atraumatic.   Eyes: EOM are normal. Pupils are equal, round, and reactive to light. No scleral icterus.   Neck: Neck supple.   Cardiovascular: Normal rate and regular rhythm.    No murmur heard.  Pulmonary/Chest: Effort normal. No respiratory distress. He has no wheezes. He has no rales.   Abdominal: Soft. He exhibits no distension. " There is no tenderness.   Musculoskeletal: He exhibits edema.   LUE PICC  Right foot dressed   Neurological: He is alert and oriented to person, place, and time.   Skin: No erythema.   Nursing note and vitals reviewed.      Meds:    Current Facility-Administered Medications:   •  insulin glargine  •  warfarin  •  MD ALERT... warfarin  •  oxycodone immediate-release **OR** oxycodone immediate-release  •  PICC Line Insertion has been implemented **AND** May use Lidocaine 1% not to exceed 3 mls for local at insertion site **AND** NOTIFY MD **AND** Tip to dwell in the superior vena cava **AND** Do not use PICC Line until placement verified by Chest X Ray **AND** DX-CHEST-FOR PICC LINE Perform procedure in: PICC Room **AND** If radiologist reading of chest X-ray states any of the following the PICC should be used **AND** Further evaluation of the PICC placement can be retrieved from X-Ray and Imaging **AND** Blood draws through PICC line; draws by RN only **AND** FLUSHING GUIDELINES WHEN IN USE **AND** normal saline PF **AND** FLUSHING GUIDELINES WHEN NOT IN USE **AND** DRESSING MAINTENANCE **AND** Change needleless pressure ports and IV tubing every 72 hours per hospital policy **AND** TUBING **AND** If there is an MD order to remove the PICC line, any RN may remove the PICC line **AND** [] PATIENT EDUCATION MATERIALS **AND** NURSING COMMUNICATION  •  DAPTOmycin  •  atorvastatin  •  clopidogrel  •  enoxaparin  •  furosemide  •  gabapentin  •  isosorbide dinitrate  •  NIFEdipine SR  •  omeprazole  •  pentoxifylline CR  •  senna-docusate **AND** polyethylene glycol/lytes **AND** magnesium hydroxide **AND** bisacodyl  •  Respiratory Care per Protocol  •  acetaminophen  •  insulin regular **AND** Accu-Chek ACHS **AND** NOTIFY MD and PharmD **AND** glucose 4 g **AND** dextrose 50%  •  ondansetron  •  ondansetron  •  promethazine  •  promethazine  •  prochlorperazine    Labs:  No results for input(s): WBC, RBC,  HEMOGLOBIN, HEMATOCRIT, MCV, MCH, RDW, PLATELETCT, MPV, NEUTSPOLYS, LYMPHOCYTES, MONOCYTES, EOSINOPHILS, BASOPHILS, RBCMORPHOLO in the last 72 hours.  No results for input(s): SODIUM, POTASSIUM, CHLORIDE, CO2, GLUCOSE, BUN, CPKTOTAL in the last 72 hours.  No results for input(s): ALBUMIN, TBILIRUBIN, ALKPHOSPHAT, TOTPROTEIN, ALTSGPT, ASTSGOT, CREATININE in the last 72 hours.    Imaging:  Dx-foot-complete 3+ Right    Result Date: 2017 1:11 PM HISTORY/REASON FOR EXAM:  Atraumatic Pain/Swelling/Deformity. TECHNIQUE/EXAM DESCRIPTION:  3 views RIGHT foot. COMPARISON:  None. FINDINGS:  There is soft tissue swelling and soft tissue gas adjacent to the distal right fourth metatarsal and right fourth toe. There is destruction of the distal right fourth metatarsal and base of the proximal phalanx. There may be erosive changes involving the margins of the fifth MTP joint. No other destructive lesions no acute fracture identified.     Distal lateral soft tissue swelling and gas consistent with infection. Destruction of the distal fourth metatarsal base of the proximal phalanx consistent with osteomyelitis. Possible osteomyelitis involving the distal fifth metatarsal base of the fifth proximal phalanx.    Le Art Duplx/imag    Result Date: 2017  Lower Extremity  Arterial Duplex Report  Vascular Laboratory  CONCLUSIONS  BAILEY FARNSWORTH  Exam Date:     2017 10:50  Room #:     Inpatient  Priority:     Routine  Ht (in):             Wt (lb):  Ordering Physician:        CORBIN MENDENHALL  Referring Physician:       CORBIN MENDENHALL  Sonographer:               Steffi Boyle RVT  Study Type:                Complete Bilateral  Technical Quality:         Adequate  Age:    62    Gender:     M  MRN:    [id number]  :    1955      BSA:  Indications:     Claudication  CPT Codes:       27541  ICD Codes:       I70.213  History:         Unhealing wound on ball of right foot x4 months. History of                    diabetes.  Limitations:                RIGHT  Waveform        Peak Systolic Velocity (cm/s)                  Prox    Prox-Mid  Mid    Mid-Dist  Distal  Triphasic                         139                      CFA  Biphasic        121                                        PFA  Triphasic       164               116              85      SFA  Triphasic                         78                       POP  Bi, non-        155                                139     AT  reversed  Monophasic      19                                 17      PT  Absent          0                                  41      SHANNON                LEFT  Waveform        Peak Systolic Velocity (cm/s)                  Prox    Prox-Mid  Mid    Mid-Dist  Distal  Triphasic                         141                      CFA  Triphasic       120                                        PFA  Triphasic       129               98               70      SFA  Triphasic                         55                       POP  Bi, non-        59                                 78      AT  reversed  Bi, non-        65                                 23      PT  reversed  Absent          75                                 0       SHANNON  FINDINGS  Right.  Diffuse plaque is seen in the common femoral, profunda femoral, femoral,  and popliteal arteries without evidence of hemodynamically significant  stenosis.  No flow can be demonstrated in the proximal peroneal artery.  Waveforms at the posterior tibial are monophasic with severly reduced  amplitude.  Waveforms at the anterior tibial artery are biphasic.  Left.  Diffuse plaque is seen in the common femoral, profunda femoral, femoral,  and popliteal arteries without evidence of hemodynamically significant  stenosis.  No flow can be demonstrated in the distal peroneal artery.  Waveforms at the posterior tibial and anterior tibial biphasic.     Le Art/katja    Result Date: 12/29/2017   Vascular Laboratory  Conclusions  JAVAD  BAILEY  Age:    62    Gender:     M  MRN:    [id number]  :    1955      BSA:  Exam Date:     2017 12:43  Room #:     Inpatient  Priority:     Routine  Ht (in):             Wt (lb):  Ordering Physician:        CORBIN MENDENHALL  Referring Physician:       CORBIN MENDENHALL  Sonographer:               Steffi Boyle RVT  Study Type:                Limited Bilateral  Technical Quality:         Adequate  Indications:     Claudication  CPT Codes:       63073  ICD Codes:       I70.213  History:         Unhealing wound on ball of right foot. Diabetes mellitus.  Limitations:     IV in left arm.                 RIGHT  Waveform            Systolic BPs (mmHg)                             112           Brachial  Triphasic                                Common Femoral  Monophasic                 76            Posterior Tibial  Bi, non-                   118           Dorsalis Pedis  reversed                                           Peroneal                             1.05          TATY                                           TBI                       LEFT  Waveform        Systolic BPs (mmHg)                                           Brachial  Triphasic                                Common Femoral  Bi, non-                   0             Posterior Tibial  reversed  Bi, non-                   100           Dorsalis Pedis  reversed                                           Peroneal                             0.89          TATY                                           TBI  Findings  Right.  Ankle brachial index is 1.05.  Doppler waveform of the common femoral and popliteal arteries are of high  amplitude and triphasic.  Doppler waveforms of the posterior tibial at the ankle are monophasic.  Doppler waveforms at the ankle are brisk and biphasic.  Left.  Ankle brachial index is 0.89 for the dorsalis pedis artery.  Ankle brachial index for the posterior tibial artery is non-compressible.  Doppler waveform of the common  "femoral artery is of high amplitude and  triphasic.  Doppler waveforms at the ankle are brisk and biphasic.  Arterial duplex scan was performed in accordance with lower extremity  arterial evaluation protocol - see separate report.       Micro:  Results     Procedure Component Value Units Date/Time    BLOOD CULTURE [[id number]] Collected:  12/29/17 1305    Order Status:  Completed Specimen:  Blood from Peripheral Updated:  01/03/18 1500     Significant Indicator NEG     Source BLD     Site PERIPHERAL     Blood Culture No growth after 5 days of incubation.    Narrative:       Per Hospital Policy: Only change Specimen Src: to \"Line\" if  specified by physician order.    BLOOD CULTURE [[id number]] Collected:  12/29/17 1304    Order Status:  Completed Specimen:  Blood from Peripheral Updated:  01/03/18 1500     Significant Indicator NEG     Source BLD     Site PERIPHERAL     Blood Culture No growth after 5 days of incubation.    Narrative:       Per Hospital Policy: Only change Specimen Src: to \"Line\" if  specified by physician order.    CULTURE TISSUE W/ GRM STAIN [[id number]]  (Abnormal)  (Susceptibility) Collected:  12/29/17 0976    Order Status:  Completed Specimen:  Tissue Updated:  01/02/18 0909     Significant Indicator POS (POS)     Source TISS     Site 5th Metatarsal Head     Tissue Culture -- (A)     Growth noted after further incubation,see below for  organism identification.       Gram Stain Result --     Rare WBCs.  No organisms seen.       Tissue Culture -- (A)     Staphylococcus epidermidis  Rare growth       Tissue Culture -- (A)     Diphtheroids  Rare growth      Culture & Susceptibility     STAPHYLOCOCCUS EPIDERMIDIS     Antibiotic Sensitivity Microscan Unit Status    Ampicillin/sulbactam Resistant 16/8 mcg/mL Final    Clindamycin Sensitive <=0.5 mcg/mL Final    Daptomycin Sensitive <=0.5 mcg/mL Final    Erythromycin Resistant >4 mcg/mL Final    Moxifloxacin Resistant >4 mcg/mL Final    Oxacillin Resistant " >2 mcg/mL Final    Penicillin Resistant >8 mcg/mL Final    Tetracycline Resistant >8 mcg/mL Final    Trimeth/Sulfa Resistant >2/38 mcg/mL Final    Vancomycin Sensitive 2 mcg/mL Final                       ANAEROBIC CULTURE [[id number]] Collected:  12/29/17 1736    Order Status:  Completed Specimen:  Tissue Updated:  01/02/18 0909     Significant Indicator NEG     Source TISS     Site 5th Metatarsal Head     Anaerobic Culture, Culture Res No Anaerobes isolated.    CULTURE TISSUE W/ GRM STAIN [[id number]]  (Abnormal)  (Susceptibility) Collected:  12/29/17 1730    Order Status:  Completed Specimen:  Tissue Updated:  01/02/18 0909     Gram Stain Result --     Few WBCs.  No organisms seen.       Significant Indicator POS (POS)     Source TISS     Site 4th Metatarsal Head     Tissue Culture -- (A)     Growth noted after further incubation,see below for  organism identification.       Tissue Culture -- (A)     Diphtheroids  Light growth       Tissue Culture -- (A)     Staphylococcus epidermidis  Light growth      Culture & Susceptibility     STAPHYLOCOCCUS EPIDERMIDIS     Antibiotic Sensitivity Microscan Unit Status    Ampicillin/sulbactam Resistant <=8/4 mcg/mL Final    Clindamycin Sensitive <=0.5 mcg/mL Final    Daptomycin Sensitive <=0.5 mcg/mL Final    Erythromycin Resistant >4 mcg/mL Final    Moxifloxacin Resistant >4 mcg/mL Final    Oxacillin Resistant >2 mcg/mL Final    Penicillin Resistant >8 mcg/mL Final    Tetracycline Resistant >8 mcg/mL Final    Trimeth/Sulfa Resistant >2/38 mcg/mL Final    Vancomycin Sensitive 2 mcg/mL Final                       ANAEROBIC CULTURE [[id number]] Collected:  12/29/17 1730    Order Status:  Completed Specimen:  Tissue Updated:  01/02/18 0909     Significant Indicator NEG     Source TISS     Site 4th Metatarsal Head     Anaerobic Culture, Culture Res No Anaerobes isolated.          Assessment:  Active Hospital Problems    Diagnosis   • *Osteomyelitis of right foot (CMS-[facility]) [M86.9]    • Chronic anticoagulation [Z79.01]   • CHF (congestive heart failure) (CMS-HCC) [I50.9]   • COPD (chronic obstructive pulmonary disease) (CMS-HCC) [J44.9]   • Coronary artery disease [I25.10]   • DM (diabetes mellitus) (CMS-HCC) [E11.9]   • Essential hypertension [I10]   • PAOD (peripheral arterial occlusive disease) (CMS-HCC) [I77.9]       Plan:  Diabetic foot ulcer with osteomyelitis  Afebrile  No leukocytosis  Status post I&D and right fourth and fifth metatarsal head and proximal phalanx excision on 12/29/17  Cultures are MRSE and diphtheroids  Continue daptomycin  Aim  for 6 weeks post-op-can do Zyvox PO last 2 weeks  Stop date 2/9/2018    Diabetes mellitus  Keep BS under 150 to help control current infection      Placement

## 2018-01-06 NOTE — CARE PLAN
Problem: Knowledge Deficit  Goal: Knowledge of disease process/condition, treatment plan, diagnostic tests, and medications will improve    Intervention: Explain information regarding disease process/condition, treatment plan, diagnostic tests, and medications and document in education  6 weeks of Iv antibiotics, placement coordination      Problem: Pain Management  Goal: Pain level will decrease to patient's comfort goal    Intervention: Follow pain managment plan developed in collaboration with patient and Interdisciplinary Team  Prn meds per MAR

## 2018-01-06 NOTE — PROGRESS NOTES
[facility] Progress Note    Date of Service: 2018    Chief Complaint  62 y.o. male with CAD, COPD, CHF, T2DM, admitted 2017 with diabetic right foot ulcer and osteomyelitis. S/p surgical debridement, and excision of 4th, 5th MT head, 4th and 5th proximal phalanx (partial). Cultures grew CoNS and diphtheroids. On antibiotics, changed to daptomycin x 6 weeks by ID.  PICC line placed. Insulin adjusted for better BG control.     Interval Problem Update  2018 - no overnight events. Remains hemodynamically stable and afebrile. Stable on RA. Excellent BP control.  . INR 1.12. SNF placement on-going.     > Seen and examined. In good spirits. No CP, SOB, nausea, vomiting, abd pain. Leg pain well controlled.       Consultants/Specialty  ID  LPS  Ortho     Disposition  Monitor on the medical floors. Await SNF placement for prolonged IV antibiotics.      ROS    Pertinent positives/negatives as mentioned above.     A complete review of systems was done. All other systems were negative.      Physical Exam  Laboratory/Imaging   Hemodynamics  Temp (24hrs), Av.5 °C (97.7 °F), Min:36.2 °C (97.2 °F), Max:37 °C (98.6 °F)   Temperature: 36.2 °C (97.2 °F)  Pulse  Av.1  Min: 48  Max: 73    Blood Pressure: 118/71      Respiratory      Respiration: 17, Pulse Oximetry: 91 %        RUL Breath Sounds: Clear, RML Breath Sounds: Clear, RLL Breath Sounds: Clear, NATASHA Breath Sounds: Clear, LLL Breath Sounds: Clear    Fluids    Intake/Output Summary (Last 24 hours) at 18 0703  Last data filed at 18 0418   Gross per 24 hour   Intake              760 ml   Output             1400 ml   Net             -640 ml       Nutrition  Orders Placed This Encounter   Procedures   • DIET ORDER     Standing Status:   Standing     Number of Occurrences:   1     Order Specific Question:   Diet:     Answer:   Diabetic [3]     Order Specific Question:   Diet:     Answer:   Cardiac [6]     Physical Exam   Constitutional:  He is oriented to person, place, and time. He appears well-developed. No distress.   Obese.   HENT:   Head: Normocephalic and atraumatic.   Mouth/Throat: Oropharynx is clear and moist. No oropharyngeal exudate.   Eyes: EOM are normal. Pupils are equal, round, and reactive to light. Right eye exhibits no discharge. Left eye exhibits no discharge. No scleral icterus.   Neck: Normal range of motion. Neck supple. No thyromegaly present.   Cardiovascular: Normal rate and regular rhythm.  Exam reveals no gallop and no friction rub.    No murmur heard.  Pulmonary/Chest: Effort normal and breath sounds normal. He has no wheezes. He has no rales. He exhibits no tenderness.   Abdominal: Soft. Bowel sounds are normal. There is no tenderness. There is no rebound and no guarding.   Musculoskeletal: Normal range of motion. He exhibits no edema or tenderness.   right leg in boot.  (+) amputated toes on right foot   Lymphadenopathy:     He has no cervical adenopathy.   Neurological: He is alert and oriented to person, place, and time. No cranial nerve deficit. Coordination normal.   Skin: Skin is warm and dry. No rash noted. He is not diaphoretic. No erythema.   Psychiatric: He has a normal mood and affect. His behavior is normal. Thought content normal.   Vitals reviewed.            Recent Labs      01/05/18   0358   INR  1.03                  Assessment/Plan     Osteomyelitis of right foot (CMS-HCC)- (present on admission)   Assessment & Plan    - S/p toe amputations.   - needs long term IV daptomycin (until 2/9/18). Await SNF placement for that.  - continue wound care per LPS.   - Continue pain control with PRN naproxen, and PO oxycodone.           Acquired circulating anticoagulants (CMS-HCC)- (present on admission)   Assessment & Plan    - for recurrent LE DVTs.   - continue coumadin per pharmacy dosing, with lovenox bridge until INR >2.         Chronic CHF (CMS-HCC)- (present on admission)   Assessment & Plan    -  compensated.   - continue home dose PO lasix, plavix, statin, beta blocker, nitrates.         DM (diabetes mellitus) (CMS-HCC)- (present on admission)   Assessment & Plan    - good FBS. At goal BG<150.   - continue lantus 30 units HS, and SSI. Accuchecks AC and HS. Continue to hold OHA for now while in-house.           COPD (chronic obstructive pulmonary disease) (CMS-HCC)- (present on admission)   Assessment & Plan    - not in acute exacerbation.   - continue RT protocol, PRN O2.         Coronary artery disease- (present on admission)   Assessment & Plan    - stable. No ACS. Patient had remote history of stent.  - continue plavix, statin, isordil,  Beta blocker.         Essential hypertension- (present on admission)   Assessment & Plan    - Well-controlled.  - continue outpatient blood pressure medication (nifedipine, isordil, atenolol, lasix).        PAOD (peripheral arterial occlusive disease) (CMS-HCC)- (present on admission)   Assessment & Plan    - Continue plavix, and statin.             Reviewed items::  Labs reviewed, Radiology images reviewed and Medications reviewed  Dorsey catheter::  No Dorsey  DVT prophylaxis pharmacological::  Enoxaparin (Lovenox)  Antibiotics:  Treating active infection/contamination beyond 24 hours perioperative coverage

## 2018-01-07 PROBLEM — R00.1 SINUS BRADYCARDIA: Status: ACTIVE | Noted: 2018-01-07

## 2018-01-07 LAB
GLUCOSE BLD-MCNC: 160 MG/DL (ref 65–99)
GLUCOSE BLD-MCNC: 161 MG/DL (ref 65–99)
GLUCOSE BLD-MCNC: 168 MG/DL (ref 65–99)
GLUCOSE BLD-MCNC: 240 MG/DL (ref 65–99)
INR PPP: 1.28 (ref 0.87–1.13)
PROTHROMBIN TIME: 15.7 SEC (ref 12–14.6)

## 2018-01-07 PROCEDURE — 700102 HCHG RX REV CODE 250 W/ 637 OVERRIDE(OP): Performed by: INTERNAL MEDICINE

## 2018-01-07 PROCEDURE — A9270 NON-COVERED ITEM OR SERVICE: HCPCS | Performed by: INTERNAL MEDICINE

## 2018-01-07 PROCEDURE — 770006 HCHG ROOM/CARE - MED/SURG/GYN SEMI*

## 2018-01-07 PROCEDURE — 700111 HCHG RX REV CODE 636 W/ 250 OVERRIDE (IP): Mod: JG | Performed by: INTERNAL MEDICINE

## 2018-01-07 PROCEDURE — 99232 SBSQ HOSP IP/OBS MODERATE 35: CPT | Performed by: INTERNAL MEDICINE

## 2018-01-07 PROCEDURE — 82962 GLUCOSE BLOOD TEST: CPT | Mod: 91

## 2018-01-07 PROCEDURE — 85610 PROTHROMBIN TIME: CPT

## 2018-01-07 PROCEDURE — 700111 HCHG RX REV CODE 636 W/ 250 OVERRIDE (IP): Performed by: INTERNAL MEDICINE

## 2018-01-07 RX ORDER — INSULIN GLARGINE 100 [IU]/ML
32 INJECTION, SOLUTION SUBCUTANEOUS EVERY EVENING
Status: DISCONTINUED | OUTPATIENT
Start: 2018-01-07 | End: 2018-01-09 | Stop reason: HOSPADM

## 2018-01-07 RX ADMIN — ENOXAPARIN SODIUM 100 MG: 100 INJECTION SUBCUTANEOUS at 08:15

## 2018-01-07 RX ADMIN — WARFARIN SODIUM 10 MG: 10 TABLET ORAL at 16:36

## 2018-01-07 RX ADMIN — GABAPENTIN 100 MG: 100 CAPSULE ORAL at 14:45

## 2018-01-07 RX ADMIN — OMEPRAZOLE 20 MG: 20 CAPSULE, DELAYED RELEASE ORAL at 08:15

## 2018-01-07 RX ADMIN — OXYCODONE HYDROCHLORIDE 10 MG: 10 TABLET ORAL at 08:17

## 2018-01-07 RX ADMIN — PENTOXIFYLLINE 400 MG: 400 TABLET, FILM COATED, EXTENDED RELEASE ORAL at 08:18

## 2018-01-07 RX ADMIN — INSULIN HUMAN 1 UNITS: 100 INJECTION, SOLUTION PARENTERAL at 11:40

## 2018-01-07 RX ADMIN — INSULIN HUMAN 1 UNITS: 100 INJECTION, SOLUTION PARENTERAL at 16:36

## 2018-01-07 RX ADMIN — ISOSORBIDE DINITRATE 20 MG: 10 TABLET ORAL at 08:16

## 2018-01-07 RX ADMIN — PENTOXIFYLLINE 400 MG: 400 TABLET, FILM COATED, EXTENDED RELEASE ORAL at 16:36

## 2018-01-07 RX ADMIN — ATORVASTATIN CALCIUM 20 MG: 20 TABLET, FILM COATED ORAL at 21:10

## 2018-01-07 RX ADMIN — FUROSEMIDE 10 MG: 20 TABLET ORAL at 08:15

## 2018-01-07 RX ADMIN — PENTOXIFYLLINE 400 MG: 400 TABLET, FILM COATED, EXTENDED RELEASE ORAL at 11:40

## 2018-01-07 RX ADMIN — OXYCODONE HYDROCHLORIDE 10 MG: 10 TABLET ORAL at 01:09

## 2018-01-07 RX ADMIN — INSULIN HUMAN 1 UNITS: 100 INJECTION, SOLUTION PARENTERAL at 06:12

## 2018-01-07 RX ADMIN — DAPTOMYCIN 600 MG: 500 INJECTION, POWDER, LYOPHILIZED, FOR SOLUTION INTRAVENOUS at 14:45

## 2018-01-07 RX ADMIN — OXYCODONE HYDROCHLORIDE 10 MG: 10 TABLET ORAL at 23:40

## 2018-01-07 RX ADMIN — OXYCODONE HYDROCHLORIDE 20 MG: 10 TABLET ORAL at 18:21

## 2018-01-07 RX ADMIN — ENOXAPARIN SODIUM 100 MG: 100 INJECTION SUBCUTANEOUS at 21:12

## 2018-01-07 RX ADMIN — INSULIN GLARGINE 32 UNITS: 100 INJECTION, SOLUTION SUBCUTANEOUS at 21:17

## 2018-01-07 RX ADMIN — GABAPENTIN 100 MG: 100 CAPSULE ORAL at 08:15

## 2018-01-07 RX ADMIN — INSULIN HUMAN 2 UNITS: 100 INJECTION, SOLUTION PARENTERAL at 21:15

## 2018-01-07 RX ADMIN — GABAPENTIN 100 MG: 100 CAPSULE ORAL at 21:08

## 2018-01-07 RX ADMIN — CLOPIDOGREL 75 MG: 75 TABLET, FILM COATED ORAL at 21:09

## 2018-01-07 RX ADMIN — NIFEDIPINE 30 MG: 30 TABLET, FILM COATED, EXTENDED RELEASE ORAL at 08:15

## 2018-01-07 ASSESSMENT — ENCOUNTER SYMPTOMS
ABDOMINAL PAIN: 0
COUGH: 0
DIZZINESS: 1
VOMITING: 0
MYALGIAS: 1
FEVER: 0
SHORTNESS OF BREATH: 0
FOCAL WEAKNESS: 0

## 2018-01-07 ASSESSMENT — PAIN SCALES - GENERAL
PAINLEVEL_OUTOF10: 9
PAINLEVEL_OUTOF10: 8
PAINLEVEL_OUTOF10: 4
PAINLEVEL_OUTOF10: 5
PAINLEVEL_OUTOF10: 5

## 2018-01-07 NOTE — PROGRESS NOTES
Inpatient Anticoagulation Service Note    Date: 1/7/2018  Reason for Anticoagulation: Deep Vein Thrombosis        Hemoglobin Value: (!) 10.6  Hematocrit Value: (!) 32.7  Lab Platelet Value: 174  Target INR: 2.0 to 3.0    INR from last 7 days     Date/Time INR Value    01/07/18 0118 (!)  1.28    01/06/18 1014  1.12    01/05/18 0358  1.03        Dose from last 7 days     Date/Time Dose (mg)    01/07/18 0118  10    01/06/18 1300  10    01/05/18 1200  15    01/04/18 1400  10        Average Dose (mg):  (Home dose = 10 mg daily)  Significant Interactions: Antibiotics, Antiplatelet Medications, Proton Pump Inhibitor, Statin (daptomycin)  Bridge Therapy: Yes     Comments: No CBC, but no notation of bleeding.  INR is starting to increase. Continue home warfarin dose of 10 mg daily.    Plan:  INR with morning labs  Education Material Provided?: No (Chronic warfarin patient )  Pharmacist suggested discharge dosing: 10 mg daily     Serenity Roth

## 2018-01-07 NOTE — CARE PLAN
Problem: Pain Management  Goal: Pain level will decrease to patient's comfort goal  Outcome: PROGRESSING AS EXPECTED  Patient c/o pain to R foot and Oxycodone 10 mg PRN Q3 given; pain moderately relieved.

## 2018-01-07 NOTE — PROGRESS NOTES
"Infectious Disease Progress Note    Author: Marianna Vicente M.D. Date & Time of service: 2018  3:01 PM    Chief Complaint:  Diabetic foot infection    Interval History:  -MAXIMUM TEMPERATURE 98.4 WBC 11.7 creatinine 0.85. No new issues overnight  17-MAXIMUM TEMPERATURE 98.4. No new issues overnight  2018-MAXIMUM TEMPERATURE 98.8. No new labs available. Anxious to go home   AF no PICC yet-tolerated dressing change well  1/3 AF got PICC-does not want to go to SNF. Denies SE abx   AF difficulties with placement-eating well   AF no complaints States \"I am trying to be pleasant\". Using boot   AF states nausea in the morning but eating full meals   AF had some dizzyness with getting up to BR otherwise feels fine  Labs Reviewed, Medications Reviewed, Radiology Reviewed and Wound Reviewed.    Review of Systems:  Review of Systems   Constitutional: Negative for fever.   HENT: Negative for hearing loss.    Respiratory: Negative for cough and shortness of breath.    Cardiovascular: Negative for chest pain.   Gastrointestinal: Negative for abdominal pain and vomiting.   Genitourinary: Negative for dysuria.   Musculoskeletal: Positive for myalgias.   Neurological: Positive for dizziness. Negative for focal weakness.   All other systems reviewed and are negative.      Hemodynamics:  Temp (24hrs), Av.4 °C (97.5 °F), Min:36.2 °C (97.1 °F), Max:36.6 °C (97.9 °F)  Temperature: 36.2 °C (97.1 °F)  Pulse  Av.4  Min: 45  Max: 73   Blood Pressure: 123/68       Physical Exam:  Physical Exam   Constitutional: He is oriented to person, place, and time. He appears well-developed and well-nourished. No distress.   HENT:   Head: Normocephalic and atraumatic.   Eyes: EOM are normal. Pupils are equal, round, and reactive to light. No scleral icterus.   Neck: Neck supple.   Cardiovascular: Normal rate and regular rhythm.    No murmur heard.  Pulmonary/Chest: Effort normal. No respiratory distress. He " has no wheezes. He has no rales.   Abdominal: Soft. He exhibits no distension. There is no tenderness.   Musculoskeletal: He exhibits edema.   LUE PICC no erythema  Right foot dressed   Neurological: He is alert and oriented to person, place, and time.   Skin: No erythema.   Nursing note and vitals reviewed.      Meds:    Current Facility-Administered Medications:   •  insulin glargine  •  warfarin  •  MD ALERT... warfarin  •  oxycodone immediate-release **OR** oxycodone immediate-release  •  PICC Line Insertion has been implemented **AND** May use Lidocaine 1% not to exceed 3 mls for local at insertion site **AND** NOTIFY MD **AND** Tip to dwell in the superior vena cava **AND** Do not use PICC Line until placement verified by Chest X Ray **AND** DX-CHEST-FOR PICC LINE Perform procedure in: PICC Room **AND** If radiologist reading of chest X-ray states any of the following the PICC should be used **AND** Further evaluation of the PICC placement can be retrieved from X-Ray and Imaging **AND** Blood draws through PICC line; draws by RN only **AND** FLUSHING GUIDELINES WHEN IN USE **AND** normal saline PF **AND** FLUSHING GUIDELINES WHEN NOT IN USE **AND** DRESSING MAINTENANCE **AND** Change needleless pressure ports and IV tubing every 72 hours per hospital policy **AND** TUBING **AND** If there is an MD order to remove the PICC line, any RN may remove the PICC line **AND** [] PATIENT EDUCATION MATERIALS **AND** NURSING COMMUNICATION  •  DAPTOmycin  •  atorvastatin  •  clopidogrel  •  enoxaparin  •  furosemide  •  gabapentin  •  isosorbide dinitrate  •  NIFEdipine SR  •  omeprazole  •  pentoxifylline CR  •  senna-docusate **AND** polyethylene glycol/lytes **AND** magnesium hydroxide **AND** bisacodyl  •  Respiratory Care per Protocol  •  acetaminophen  •  insulin regular **AND** Accu-Chek ACHS **AND** NOTIFY MD and PharmD **AND** glucose 4 g **AND** dextrose 50%  •  ondansetron  •  ondansetron  •   promethazine  •  promethazine  •  prochlorperazine    Labs:  No results for input(s): WBC, RBC, HEMOGLOBIN, HEMATOCRIT, MCV, MCH, RDW, PLATELETCT, MPV, NEUTSPOLYS, LYMPHOCYTES, MONOCYTES, EOSINOPHILS, BASOPHILS, RBCMORPHOLO in the last 72 hours.  No results for input(s): SODIUM, POTASSIUM, CHLORIDE, CO2, GLUCOSE, BUN, CPKTOTAL in the last 72 hours.  No results for input(s): ALBUMIN, TBILIRUBIN, ALKPHOSPHAT, TOTPROTEIN, ALTSGPT, ASTSGOT, CREATININE in the last 72 hours.    Imaging:  Dx-foot-complete 3+ Right    Result Date: 2017 1:11 PM HISTORY/REASON FOR EXAM:  Atraumatic Pain/Swelling/Deformity. TECHNIQUE/EXAM DESCRIPTION:  3 views RIGHT foot. COMPARISON:  None. FINDINGS:  There is soft tissue swelling and soft tissue gas adjacent to the distal right fourth metatarsal and right fourth toe. There is destruction of the distal right fourth metatarsal and base of the proximal phalanx. There may be erosive changes involving the margins of the fifth MTP joint. No other destructive lesions no acute fracture identified.     Distal lateral soft tissue swelling and gas consistent with infection. Destruction of the distal fourth metatarsal base of the proximal phalanx consistent with osteomyelitis. Possible osteomyelitis involving the distal fifth metatarsal base of the fifth proximal phalanx.    Le Art Duplx/imag    Result Date: 2017  Lower Extremity  Arterial Duplex Report  Vascular Laboratory  CONCLUSIONS  BAILEY FARNSWORTH  Exam Date:     2017 10:50  Room #:     Inpatient  Priority:     Routine  Ht (in):             Wt (lb):  Ordering Physician:        CORBIN MENDENHALL  Referring Physician:       CORBIN MENDENHALL  Sonographer:               Steffi Boyle RVT  Study Type:                Complete Bilateral  Technical Quality:         Adequate  Age:    62    Gender:     M  MRN:    [id number]  :    1955      BSA:  Indications:     Claudication  CPT Codes:       80958  ICD Codes:       I70.213   History:         Unhealing wound on ball of right foot x4 months. History of                   diabetes.  Limitations:                RIGHT  Waveform        Peak Systolic Velocity (cm/s)                  Prox    Prox-Mid  Mid    Mid-Dist  Distal  Triphasic                         139                      CFA  Biphasic        121                                        PFA  Triphasic       164               116              85      SFA  Triphasic                         78                       POP  Bi, non-        155                                139     AT  reversed  Monophasic      19                                 17      PT  Absent          0                                  41      SHANNON                LEFT  Waveform        Peak Systolic Velocity (cm/s)                  Prox    Prox-Mid  Mid    Mid-Dist  Distal  Triphasic                         141                      CFA  Triphasic       120                                        PFA  Triphasic       129               98               70      SFA  Triphasic                         55                       POP  Bi, non-        59                                 78      AT  reversed  Bi, non-        65                                 23      PT  reversed  Absent          75                                 0       SHANNON  FINDINGS  Right.  Diffuse plaque is seen in the common femoral, profunda femoral, femoral,  and popliteal arteries without evidence of hemodynamically significant  stenosis.  No flow can be demonstrated in the proximal peroneal artery.  Waveforms at the posterior tibial are monophasic with severly reduced  amplitude.  Waveforms at the anterior tibial artery are biphasic.  Left.  Diffuse plaque is seen in the common femoral, profunda femoral, femoral,  and popliteal arteries without evidence of hemodynamically significant  stenosis.  No flow can be demonstrated in the distal peroneal artery.  Waveforms at the posterior tibial and anterior tibial  biphasic.     Le Art/katja    Result Date: 2017   Vascular Laboratory  Conclusions  BAILEY FARNSWORTH  Age:    62    Gender:     M  MRN:    [id number]  :    1955      BSA:  Exam Date:     2017 12:43  Room #:     Inpatient  Priority:     Routine  Ht (in):             Wt (lb):  Ordering Physician:        CORBIN MENDENHALL  Referring Physician:       CORBIN MENDENAHLL  Sonographer:               Steffi Boyle RVT  Study Type:                Limited Bilateral  Technical Quality:         Adequate  Indications:     Claudication  CPT Codes:       07883  ICD Codes:       I70.213  History:         Unhealing wound on ball of right foot. Diabetes mellitus.  Limitations:     IV in left arm.                 RIGHT  Waveform            Systolic BPs (mmHg)                             112           Brachial  Triphasic                                Common Femoral  Monophasic                 76            Posterior Tibial  Bi, non-                   118           Dorsalis Pedis  reversed                                           Peroneal                             1.05          KATJA                                           TBI                       LEFT  Waveform        Systolic BPs (mmHg)                                           Brachial  Triphasic                                Common Femoral  Bi, non-                   0             Posterior Tibial  reversed  Bi, non-                   100           Dorsalis Pedis  reversed                                           Peroneal                             0.89          KATJA                                           TBI  Findings  Right.  Ankle brachial index is 1.05.  Doppler waveform of the common femoral and popliteal arteries are of high  amplitude and triphasic.  Doppler waveforms of the posterior tibial at the ankle are monophasic.  Doppler waveforms at the ankle are brisk and biphasic.  Left.  Ankle brachial index is 0.89 for the dorsalis pedis artery.  Ankle brachial  "index for the posterior tibial artery is non-compressible.  Doppler waveform of the common femoral artery is of high amplitude and  triphasic.  Doppler waveforms at the ankle are brisk and biphasic.  Arterial duplex scan was performed in accordance with lower extremity  arterial evaluation protocol - see separate report.       Micro:  Results     Procedure Component Value Units Date/Time    BLOOD CULTURE [[id number]] Collected:  12/29/17 1305    Order Status:  Completed Specimen:  Blood from Peripheral Updated:  01/03/18 1500     Significant Indicator NEG     Source BLD     Site PERIPHERAL     Blood Culture No growth after 5 days of incubation.    Narrative:       Per Hospital Policy: Only change Specimen Src: to \"Line\" if  specified by physician order.    BLOOD CULTURE [[id number]] Collected:  12/29/17 1304    Order Status:  Completed Specimen:  Blood from Peripheral Updated:  01/03/18 1500     Significant Indicator NEG     Source BLD     Site PERIPHERAL     Blood Culture No growth after 5 days of incubation.    Narrative:       Per Hospital Policy: Only change Specimen Src: to \"Line\" if  specified by physician order.    CULTURE TISSUE W/ GRM STAIN [[id number]]  (Abnormal)  (Susceptibility) Collected:  12/29/17 1736    Order Status:  Completed Specimen:  Tissue Updated:  01/02/18 0909     Significant Indicator POS (POS)     Source TISS     Site 5th Metatarsal Head     Tissue Culture -- (A)     Growth noted after further incubation,see below for  organism identification.       Gram Stain Result --     Rare WBCs.  No organisms seen.       Tissue Culture -- (A)     Staphylococcus epidermidis  Rare growth       Tissue Culture -- (A)     Diphtheroids  Rare growth      Culture & Susceptibility     STAPHYLOCOCCUS EPIDERMIDIS     Antibiotic Sensitivity Microscan Unit Status    Ampicillin/sulbactam Resistant 16/8 mcg/mL Final    Clindamycin Sensitive <=0.5 mcg/mL Final    Daptomycin Sensitive <=0.5 mcg/mL Final    Erythromycin " Resistant >4 mcg/mL Final    Moxifloxacin Resistant >4 mcg/mL Final    Oxacillin Resistant >2 mcg/mL Final    Penicillin Resistant >8 mcg/mL Final    Tetracycline Resistant >8 mcg/mL Final    Trimeth/Sulfa Resistant >2/38 mcg/mL Final    Vancomycin Sensitive 2 mcg/mL Final                       ANAEROBIC CULTURE [[id number]] Collected:  12/29/17 1736    Order Status:  Completed Specimen:  Tissue Updated:  01/02/18 0909     Significant Indicator NEG     Source TISS     Site 5th Metatarsal Head     Anaerobic Culture, Culture Res No Anaerobes isolated.    CULTURE TISSUE W/ GRM STAIN [[id number]]  (Abnormal)  (Susceptibility) Collected:  12/29/17 1730    Order Status:  Completed Specimen:  Tissue Updated:  01/02/18 0909     Gram Stain Result --     Few WBCs.  No organisms seen.       Significant Indicator POS (POS)     Source TISS     Site 4th Metatarsal Head     Tissue Culture -- (A)     Growth noted after further incubation,see below for  organism identification.       Tissue Culture -- (A)     Diphtheroids  Light growth       Tissue Culture -- (A)     Staphylococcus epidermidis  Light growth      Culture & Susceptibility     STAPHYLOCOCCUS EPIDERMIDIS     Antibiotic Sensitivity Microscan Unit Status    Ampicillin/sulbactam Resistant <=8/4 mcg/mL Final    Clindamycin Sensitive <=0.5 mcg/mL Final    Daptomycin Sensitive <=0.5 mcg/mL Final    Erythromycin Resistant >4 mcg/mL Final    Moxifloxacin Resistant >4 mcg/mL Final    Oxacillin Resistant >2 mcg/mL Final    Penicillin Resistant >8 mcg/mL Final    Tetracycline Resistant >8 mcg/mL Final    Trimeth/Sulfa Resistant >2/38 mcg/mL Final    Vancomycin Sensitive 2 mcg/mL Final                       ANAEROBIC CULTURE [[id number]] Collected:  12/29/17 1730    Order Status:  Completed Specimen:  Tissue Updated:  01/02/18 0909     Significant Indicator NEG     Source TISS     Site 4th Metatarsal Head     Anaerobic Culture, Culture Res No Anaerobes isolated.           Assessment:  Active Hospital Problems    Diagnosis   • *Osteomyelitis of right foot (CMS-HCC) [M86.9]   • Chronic anticoagulation [Z79.01]   • CHF (congestive heart failure) (CMS-HCC) [I50.9]   • COPD (chronic obstructive pulmonary disease) (CMS-HCC) [J44.9]   • Coronary artery disease [I25.10]   • DM (diabetes mellitus) (CMS-HCC) [E11.9]   • Essential hypertension [I10]   • PAOD (peripheral arterial occlusive disease) (CMS-HCC) [I77.9]       Plan:  Diabetic foot ulcer with osteomyelitis  Afebrile  No leukocytosis  Status post I&D and right fourth and fifth metatarsal head and proximal phalanx excision on 12/29/17  Cultures are MRSE and diphtheroids  Continue daptomycin  Aim  for 6 weeks post-op-can do Zyvox PO last 2 weeks  Stop date 2/9/2018    Diabetes mellitus  Keep BS under 150 to help control current infection    Dizzyness, new  Atenolol stopped  Fall precautions discussed  Placement

## 2018-01-07 NOTE — PROGRESS NOTES
[facility] Hospitalist Progress Note    Date of Service: 2018    Chief Complaint  62 y.o. male with CAD, COPD, CHF, T2DM, admitted 2017 with diabetic right foot ulcer and osteomyelitis. S/p surgical debridement, and excision of 4th, 5th MT head, 4th and 5th proximal phalanx (partial). Cultures grew CoNS and diphtheroids. On antibiotics, changed to daptomycin x 6 weeks by ID.  PICC line placed. Insulin adjusted for better BG control.     Interval Problem Update  2018 - uneventful night. VSS. Afebrile. Saturating well on RA. Noted tohave sinus bradycardia yesterday, atenolol stopped. BP remains well controlled.  . INR 1.28.    > Seen and examined. In good spirits. Had lightheadedness yesterday but not today. No nausea, vomiting, abd pain, CP, SOB. Not complaining of pain on the right leg.     Consultants/Specialty  ID  LPS  Ortho     Disposition  Monitor on the medical floors. Await SNF placement for prolonged IV antibiotics.      ROS    Pertinent positives/negatives as mentioned above.     A complete review of systems was done. All other systems were negative.      Physical Exam  Laboratory/Imaging   Hemodynamics  Temp (24hrs), Av.3 °C (97.4 °F), Min:36 °C (96.8 °F), Max:36.6 °C (97.9 °F)   Temperature: 36.6 °C (97.9 °F)  Pulse  Av.5  Min: 45  Max: 73    Blood Pressure: 115/73      Respiratory      Respiration: 17, Pulse Oximetry: 92 %     Work Of Breathing / Effort: Mild  RUL Breath Sounds: Clear, RML Breath Sounds: Clear, RLL Breath Sounds: Diminished, NATASHA Breath Sounds: Clear, LLL Breath Sounds: Diminished    Fluids    Intake/Output Summary (Last 24 hours) at 18 0711  Last data filed at 18 0600   Gross per 24 hour   Intake              480 ml   Output              800 ml   Net             -320 ml       Nutrition  Orders Placed This Encounter   Procedures   • DIET ORDER     Standing Status:   Standing     Number of Occurrences:   1     Order Specific Question:   Diet:      Answer:   Diabetic [3]     Order Specific Question:   Diet:     Answer:   Cardiac [6]     Physical Exam   Constitutional: He is oriented to person, place, and time. He appears well-developed. No distress.   Obese.   HENT:   Head: Normocephalic and atraumatic.   Mouth/Throat: Oropharynx is clear and moist. No oropharyngeal exudate.   Eyes: EOM are normal. Pupils are equal, round, and reactive to light. Right eye exhibits no discharge. Left eye exhibits no discharge. No scleral icterus.   Neck: Normal range of motion. Neck supple. No thyromegaly present.   Cardiovascular: Regular rhythm.  Exam reveals no gallop and no friction rub.    No murmur heard.  bradycardia   Pulmonary/Chest: Effort normal and breath sounds normal. He has no wheezes. He has no rales. He exhibits no tenderness.   Abdominal: Soft. Bowel sounds are normal. There is no tenderness. There is no rebound and no guarding.   Musculoskeletal: Normal range of motion. He exhibits no edema or tenderness.   right leg in boot.  (+) amputated toes on right foot   Lymphadenopathy:     He has no cervical adenopathy.   Neurological: He is alert and oriented to person, place, and time. No cranial nerve deficit. Coordination normal.   Skin: Skin is warm and dry. No rash noted. He is not diaphoretic. No erythema.   Psychiatric: He has a normal mood and affect. His behavior is normal. Thought content normal.   pleasant today, in good spirits.    Vitals reviewed.              Recent Labs      01/05/18   0358  01/06/18   1014  01/07/18   0118   INR  1.03  1.12  1.28*                  Assessment/Plan     Osteomyelitis of right foot (CMS-[facility])- (present on admission)   Assessment & Plan    - S/p toe amputations.   - ID recommending IV daptomycin until 2/9/18, pending SNF placement for long term antibiotics.  - continue wound care per LPS.   - Continue pain control with PRN oxycodone.           Sinus bradycardia   Assessment & Plan    - D/C atenolol. Monitor. Remains  asymptomatic.        Acquired circulating anticoagulants (CMS-HCC)- (present on admission)   Assessment & Plan    - for recurrent LE DVTs. Continue lovenox bridge until INR >2. Continue coumadin per pharmacy dosing.        Chronic CHF (CMS-HCC)- (present on admission)   Assessment & Plan    - compensated. Continue home dose PO lasix, plavix, statin, nitrates. D/C beta blocker due to bradycardia.        DM (diabetes mellitus) (CMS-HCC)- (present on admission)   Assessment & Plan    - increase lantus to 32 units HS, to achieve goal BG<150.   - Continue SSI. Accuchecks AC and HS. Continue to hold OHA for now while in-house.           COPD (chronic obstructive pulmonary disease) (CMS-HCC)- (present on admission)   Assessment & Plan    - not in acute exacerbation. Continue RT protocol, PRN O2.         Coronary artery disease- (present on admission)   Assessment & Plan    - stable. No ACS. Patient had remote history of stent.  - continue plavix, statin, isordil. Discontinue beta blocker due to bradycardia.         Essential hypertension- (present on admission)   Assessment & Plan    - Well-controlled.  - continue outpatient blood pressure medication (nifedipine, isordil, lasix). D/C atenolol due to bradycardia.        PAOD (peripheral arterial occlusive disease) (CMS-HCC)- (present on admission)   Assessment & Plan    - Continue plavix, and statin.             Reviewed items::  Labs reviewed, Radiology images reviewed and Medications reviewed  Dorsey catheter::  No Dorsey  DVT prophylaxis pharmacological::  Enoxaparin (Lovenox)  Antibiotics:  Treating active infection/contamination beyond 24 hours perioperative coverage

## 2018-01-07 NOTE — CARE PLAN
Problem: Mobility  Goal: Risk for activity intolerance will decrease  Outcome: PROGRESSING AS EXPECTED  Patient up in room with walker and boot on R foot. Bed alarm refused. Patient steady on feet and does not need assistance. Patient educated on fall precautions and to call if he feels dizzy or unsteady. Patient will call if assistance needed.

## 2018-01-08 LAB
GLUCOSE BLD-MCNC: 160 MG/DL (ref 65–99)
GLUCOSE BLD-MCNC: 171 MG/DL (ref 65–99)
GLUCOSE BLD-MCNC: 173 MG/DL (ref 65–99)
GLUCOSE BLD-MCNC: 195 MG/DL (ref 65–99)
INR PPP: 1.52 (ref 0.87–1.13)
PROTHROMBIN TIME: 18 SEC (ref 12–14.6)

## 2018-01-08 PROCEDURE — 85610 PROTHROMBIN TIME: CPT

## 2018-01-08 PROCEDURE — A9270 NON-COVERED ITEM OR SERVICE: HCPCS | Performed by: INTERNAL MEDICINE

## 2018-01-08 PROCEDURE — 82962 GLUCOSE BLOOD TEST: CPT | Mod: 91

## 2018-01-08 PROCEDURE — 99232 SBSQ HOSP IP/OBS MODERATE 35: CPT | Performed by: INTERNAL MEDICINE

## 2018-01-08 PROCEDURE — 770006 HCHG ROOM/CARE - MED/SURG/GYN SEMI*

## 2018-01-08 PROCEDURE — 700111 HCHG RX REV CODE 636 W/ 250 OVERRIDE (IP): Performed by: INTERNAL MEDICINE

## 2018-01-08 PROCEDURE — 700102 HCHG RX REV CODE 250 W/ 637 OVERRIDE(OP): Performed by: INTERNAL MEDICINE

## 2018-01-08 PROCEDURE — 700111 HCHG RX REV CODE 636 W/ 250 OVERRIDE (IP): Mod: JG | Performed by: INTERNAL MEDICINE

## 2018-01-08 RX ADMIN — OXYCODONE HYDROCHLORIDE 20 MG: 10 TABLET ORAL at 21:04

## 2018-01-08 RX ADMIN — GABAPENTIN 100 MG: 100 CAPSULE ORAL at 09:00

## 2018-01-08 RX ADMIN — CLOPIDOGREL 75 MG: 75 TABLET, FILM COATED ORAL at 21:04

## 2018-01-08 RX ADMIN — OXYCODONE HYDROCHLORIDE 10 MG: 10 TABLET ORAL at 09:03

## 2018-01-08 RX ADMIN — GABAPENTIN 100 MG: 100 CAPSULE ORAL at 14:10

## 2018-01-08 RX ADMIN — PENTOXIFYLLINE 400 MG: 400 TABLET, FILM COATED, EXTENDED RELEASE ORAL at 08:59

## 2018-01-08 RX ADMIN — NIFEDIPINE 30 MG: 30 TABLET, FILM COATED, EXTENDED RELEASE ORAL at 08:57

## 2018-01-08 RX ADMIN — STANDARDIZED SENNA CONCENTRATE AND DOCUSATE SODIUM 2 TABLET: 8.6; 5 TABLET, FILM COATED ORAL at 08:57

## 2018-01-08 RX ADMIN — PENTOXIFYLLINE 400 MG: 400 TABLET, FILM COATED, EXTENDED RELEASE ORAL at 16:32

## 2018-01-08 RX ADMIN — FUROSEMIDE 10 MG: 20 TABLET ORAL at 08:58

## 2018-01-08 RX ADMIN — ENOXAPARIN SODIUM 100 MG: 100 INJECTION SUBCUTANEOUS at 08:57

## 2018-01-08 RX ADMIN — ISOSORBIDE DINITRATE 20 MG: 10 TABLET ORAL at 08:57

## 2018-01-08 RX ADMIN — INSULIN HUMAN 1 UNITS: 100 INJECTION, SOLUTION PARENTERAL at 11:39

## 2018-01-08 RX ADMIN — METFORMIN HYDROCHLORIDE 1000 MG: 500 TABLET, FILM COATED ORAL at 08:57

## 2018-01-08 RX ADMIN — ENOXAPARIN SODIUM 100 MG: 100 INJECTION SUBCUTANEOUS at 21:51

## 2018-01-08 RX ADMIN — METFORMIN HYDROCHLORIDE 1000 MG: 500 TABLET, FILM COATED ORAL at 16:32

## 2018-01-08 RX ADMIN — OXYCODONE HYDROCHLORIDE 10 MG: 10 TABLET ORAL at 03:18

## 2018-01-08 RX ADMIN — INSULIN HUMAN 1 UNITS: 100 INJECTION, SOLUTION PARENTERAL at 06:02

## 2018-01-08 RX ADMIN — INSULIN GLARGINE 32 UNITS: 100 INJECTION, SOLUTION SUBCUTANEOUS at 21:09

## 2018-01-08 RX ADMIN — ATORVASTATIN CALCIUM 20 MG: 20 TABLET, FILM COATED ORAL at 21:04

## 2018-01-08 RX ADMIN — OMEPRAZOLE 20 MG: 20 CAPSULE, DELAYED RELEASE ORAL at 08:57

## 2018-01-08 RX ADMIN — WARFARIN SODIUM 10 MG: 10 TABLET ORAL at 16:32

## 2018-01-08 RX ADMIN — OXYCODONE HYDROCHLORIDE 20 MG: 10 TABLET ORAL at 18:01

## 2018-01-08 RX ADMIN — INSULIN HUMAN 2 UNITS: 100 INJECTION, SOLUTION PARENTERAL at 21:09

## 2018-01-08 RX ADMIN — GABAPENTIN 100 MG: 100 CAPSULE ORAL at 21:03

## 2018-01-08 RX ADMIN — PENTOXIFYLLINE 400 MG: 400 TABLET, FILM COATED, EXTENDED RELEASE ORAL at 11:41

## 2018-01-08 RX ADMIN — INSULIN HUMAN 1 UNITS: 100 INJECTION, SOLUTION PARENTERAL at 16:31

## 2018-01-08 RX ADMIN — DAPTOMYCIN 600 MG: 500 INJECTION, POWDER, LYOPHILIZED, FOR SOLUTION INTRAVENOUS at 14:10

## 2018-01-08 RX ADMIN — STANDARDIZED SENNA CONCENTRATE AND DOCUSATE SODIUM 2 TABLET: 8.6; 5 TABLET, FILM COATED ORAL at 21:03

## 2018-01-08 ASSESSMENT — PAIN SCALES - GENERAL
PAINLEVEL_OUTOF10: 8
PAINLEVEL_OUTOF10: 6
PAINLEVEL_OUTOF10: 0
PAINLEVEL_OUTOF10: 10
PAINLEVEL_OUTOF10: 7
PAINLEVEL_OUTOF10: 3
PAINLEVEL_OUTOF10: 8

## 2018-01-08 ASSESSMENT — ENCOUNTER SYMPTOMS
DIZZINESS: 0
FEVER: 0
SHORTNESS OF BREATH: 0
COUGH: 0
ABDOMINAL PAIN: 0
VOMITING: 0
MYALGIAS: 1
FOCAL WEAKNESS: 0

## 2018-01-08 NOTE — CARE PLAN
Problem: Safety  Goal: Will remain free from injury  Outcome: PROGRESSING AS EXPECTED  Treaded socks in place, bed in the lowest position, call light and belongings within reach, pt call for assistance appropriately    Problem: Pain Management  Goal: Pain level will decrease to patient's comfort goal  Outcome: PROGRESSING AS EXPECTED  Pt. Taking oxy 10 mg with adequate pain control.

## 2018-01-08 NOTE — PROGRESS NOTES
[facility] Hospitalist Progress Note    Date of Service: 2018    Chief Complaint  62 y.o. male with CAD, COPD, CHF, T2DM, admitted 2017 with diabetic right foot ulcer and osteomyelitis. S/p surgical debridement, and excision of 4th, 5th MT head, 4th and 5th proximal phalanx (partial). Cultures grew CoNS and diphtheroids. On antibiotics, changed to daptomycin x 6 weeks by ID.  PICC line placed. Insulin adjusted for better BG control. Noted tohave sinus bradycardia, atenolol stopped.    Interval Problem Update  2018 - no overnight events. Remains hemodynamically stable and afebrile. HR improved to the 60s. BP well controlled. Stable on RA.  . INR 1.52.    > Seen and examined. Pain on the right leg, has not gotten his pain pill this morning. No nausea, vomiting, abd pain, CP, SOB.     Consultants/Specialty  ID  LPS  Ortho     Disposition  Monitor on the medical floors. Pending SNF placement.      ROS    Pertinent positives/negatives as mentioned above.     A complete review of systems was done. All other systems were negative.      Physical Exam  Laboratory/Imaging   Hemodynamics  Temp (24hrs), Av.2 °C (97.1 °F), Min:36.1 °C (97 °F), Max:36.2 °C (97.2 °F)   Temperature: 36.1 °C (97 °F)  Pulse  Av.5  Min: 45  Max: 73    Blood Pressure: 115/70      Respiratory      Respiration: 16, Pulse Oximetry: 94 %     Work Of Breathing / Effort: Mild  RUL Breath Sounds: Clear, RML Breath Sounds: Clear, RLL Breath Sounds: Diminished, NATASHA Breath Sounds: Clear, LLL Breath Sounds: Diminished    Fluids    Intake/Output Summary (Last 24 hours) at 18 0709  Last data filed at 18 0352   Gross per 24 hour   Intake                0 ml   Output              300 ml   Net             -300 ml       Nutrition  Orders Placed This Encounter   Procedures   • DIET ORDER     Standing Status:   Standing     Number of Occurrences:   1     Order Specific Question:   Diet:     Answer:   Diabetic [3]     Order Specific  Question:   Diet:     Answer:   Cardiac [6]     Physical Exam   Constitutional: He is oriented to person, place, and time. He appears well-developed and well-nourished. No distress.   Obese. Body mass index is 31.97 kg/m².   HENT:   Head: Normocephalic and atraumatic.   Mouth/Throat: Oropharynx is clear and moist. No oropharyngeal exudate.   Eyes: EOM are normal. Pupils are equal, round, and reactive to light. Right eye exhibits no discharge. Left eye exhibits no discharge. No scleral icterus.   Neck: Normal range of motion. Neck supple. No thyromegaly present.   Cardiovascular: Normal rate and regular rhythm.  Exam reveals no gallop and no friction rub.    No murmur heard.  Pulmonary/Chest: Effort normal and breath sounds normal. He has no wheezes. He has no rales. He exhibits no tenderness.   CTA B/L   Abdominal: Soft. Bowel sounds are normal. There is no tenderness. There is no rebound and no guarding.   Musculoskeletal: Normal range of motion. He exhibits no edema or tenderness.   right leg in boot. (+) amputated toes on right foot. No tenderness.   Lymphadenopathy:     He has no cervical adenopathy.   Neurological: He is alert and oriented to person, place, and time. No cranial nerve deficit. Coordination normal.   Skin: Skin is warm and dry. No rash noted. He is not diaphoretic. No erythema.   Psychiatric: He has a normal mood and affect. His behavior is normal. Thought content normal.   Vitals reviewed.                Recent Labs      01/06/18   1014  01/07/18   0118  01/08/18   0312   INR  1.12  1.28*  1.52*                  Assessment/Plan     Osteomyelitis of right foot (CMS-[facility])- (present on admission)   Assessment & Plan    - S/p toe amputations.   - Await SNF placement for long term antibiotics (IV daptomycin) until 2/9/18.  - continue wound care per LPS.   - Continue pain control with PRN oxycodone.           Sinus bradycardia   Assessment & Plan    - Much improved with stopping atenolol. Continue to  monitor.         Acquired circulating anticoagulants (CMS-HCC)- (present on admission)   Assessment & Plan    - for recurrent LE DVTs. INR still subtherapeutic. Continue lovenox bridge and coumadin until INR >2.        Chronic CHF (CMS-HCC)- (present on admission)   Assessment & Plan    - not in acute failure. Continue home dose PO lasix, plavix, statin, nitrates. Off beta blocker due to bradycardia.        DM (diabetes mellitus) (CMS-HCC)- (present on admission)   Assessment & Plan    - Continue lantus 32 units HS, and resume home dose metformin. Goal achieve F BG<150.   - Continue SSI. Accuchecks AC and HS.            COPD (chronic obstructive pulmonary disease) (CMS-HCC)- (present on admission)   Assessment & Plan    - not in acute exacerbation. Continue RT protocol, PRN O2.         Coronary artery disease- (present on admission)   Assessment & Plan    - stable. No ACS. Patient had remote history of stent.  - continue plavix, statin, isordil. Off beta blocker due to bradycardia.         Essential hypertension- (present on admission)   Assessment & Plan    - Well-controlled.  - continue outpatient blood pressure medication (nifedipine, isordil, lasix). Off atenolol due to bradycardia.        PAOD (peripheral arterial occlusive disease) (CMS-HCC)- (present on admission)   Assessment & Plan    - Continue plavix, and statin.             Reviewed items::  Labs reviewed, Radiology images reviewed and Medications reviewed  Dorsey catheter::  No Dorsey  DVT prophylaxis pharmacological::  Enoxaparin (Lovenox)  Antibiotics:  Treating active infection/contamination beyond 24 hours perioperative coverage

## 2018-01-08 NOTE — DISCHARGE PLANNING
[facility] called [facility]. The referral has been denied staffing shortage.     [facility] called [facility] message was left for Presley Giron.

## 2018-01-08 NOTE — CARE PLAN
Problem: Mobility  Goal: Risk for activity intolerance will decrease  Outcome: PROGRESSING AS EXPECTED  Up with SBA, uses boot to walk

## 2018-01-08 NOTE — PROGRESS NOTES
Inpatient Anticoagulation Service Note    Date: 1/8/2018  Reason for Anticoagulation: Deep Vein Thrombosis        Hemoglobin Value: (!) 10.6  Hematocrit Value: (!) 32.7  Lab Platelet Value: 174  Target INR: 2.0 to 3.0    INR from last 7 days     Date/Time INR Value    01/08/18 0312 (!)  1.52    01/07/18 0118 (!)  1.28    01/06/18 1014  1.12    01/05/18 0358  1.03        Dose from last 7 days     Date/Time Dose (mg)    01/08/18 1300  10    01/07/18 0118  10    01/06/18 1300  10    01/05/18 1200  15    01/04/18 1400  10        Average Dose (mg):  (Home dose = 10 mg daily)  Significant Interactions: Antibiotics, Clopidogrel, Statin, Proton Pump Inhibitor  Bridge Therapy: Yes   INR Value Greater than 2 Prior to Discontinuation of Parenteral Anticoagulation: Not Applicable     Comments: INR subtherapeutic but trending up. Continue home dose with lovenox bridge. NNL to assess h/h but no indication of bleeding noted. No new DDI.     Plan:  Warfarin 10mg and lovenox bridge with INR check tomorrow  Education Material Provided?: No (Chronic warfarin patient )  Pharmacist suggested discharge dosing: Warfarin 10mg PO daily with close f/u within 3 days       Twyla Wong, PharmD., BCPS

## 2018-01-08 NOTE — PROGRESS NOTES
Report received. Assumed care of pt. A/O x4. VSS. Responds appropriately. C/o pain, medicated per MAR Assessment complete, dressing to R foot cdi, boot in place, pt. Up standby assist. All needs met at this time. Explained importance of calling before getting OOB. Call light and belongings within reach. Bed in the lowest position. Treaded socks in place. Will continue to monitor .

## 2018-01-08 NOTE — DISCHARGE PLANNING
[facility] received notification from the following facilities.     [facility] denied. Dapto is too expensive    [facility] Accepted    [facility] can accept Pending the patient is able to follow the non Smoking Rules.

## 2018-01-08 NOTE — CARE PLAN
Problem: Safety  Goal: Will remain free from falls  Outcome: PROGRESSING AS EXPECTED  Siderails up x2, call light in reach

## 2018-01-08 NOTE — PROGRESS NOTES
"Infectious Disease Progress Note    Author: Cece Zaragoza M.D. Date & Time of service: 2018  12:50 PM    Chief Complaint:  Diabetic foot infection    Interval History:  -MAXIMUM TEMPERATURE 98.4 WBC 11.7 creatinine 0.85. No new issues overnight  17-MAXIMUM TEMPERATURE 98.4. No new issues overnight  2018-MAXIMUM TEMPERATURE 98.8. No new labs available. Anxious to go home   AF no PICC yet-tolerated dressing change well  1/3 AF got PICC-does not want to go to SNF. Denies SE abx   AF difficulties with placement-eating well   AF no complaints States \"I am trying to be pleasant\". Using boot   AF states nausea in the morning but eating full meals   AF had some dizzyness with getting up to BR otherwise feels fine   AF, wants to go home, asking why he cannot do home IV abx, no new symptoms  Labs Reviewed, Medications Reviewed, Radiology Reviewed and Wound Reviewed.    Review of Systems:  Review of Systems   Constitutional: Negative for fever.   HENT: Negative for hearing loss.    Respiratory: Negative for cough and shortness of breath.    Cardiovascular: Negative for chest pain.   Gastrointestinal: Negative for abdominal pain and vomiting.   Genitourinary: Negative for dysuria.   Musculoskeletal: Positive for myalgias.   Neurological: Negative for dizziness and focal weakness.   All other systems reviewed and are negative.      Hemodynamics:  Temp (24hrs), Av.1 °C (96.9 °F), Min:35.7 °C (96.3 °F), Max:36.2 °C (97.2 °F)  Temperature: (!) 35.7 °C (96.3 °F)  Pulse  Av.5  Min: 45  Max: 73   Blood Pressure: 103/58       Physical Exam:  Physical Exam   Constitutional: He is oriented to person, place, and time. He appears well-developed and well-nourished. No distress.   HENT:   Head: Normocephalic and atraumatic.   Eyes: EOM are normal. Pupils are equal, round, and reactive to light. No scleral icterus.   Neck: Neck supple.   Cardiovascular: Normal rate and regular rhythm.    No " murmur heard.  Pulmonary/Chest: Effort normal. No respiratory distress. He has no wheezes. He has no rales.   Abdominal: Soft. He exhibits no distension. There is no tenderness.   Musculoskeletal: He exhibits edema.   LUE PICC no erythema  Right foot dressed   Neurological: He is alert and oriented to person, place, and time.   Skin: No erythema.   Nursing note and vitals reviewed.      Meds:    Current Facility-Administered Medications:   •  metformin  •  insulin glargine  •  warfarin  •  MD ALERT... warfarin  •  oxycodone immediate-release **OR** oxycodone immediate-release  •  PICC Line Insertion has been implemented **AND** May use Lidocaine 1% not to exceed 3 mls for local at insertion site **AND** NOTIFY MD **AND** Tip to dwell in the superior vena cava **AND** Do not use PICC Line until placement verified by Chest X Ray **AND** DX-CHEST-FOR PICC LINE Perform procedure in: PICC Room **AND** If radiologist reading of chest X-ray states any of the following the PICC should be used **AND** Further evaluation of the PICC placement can be retrieved from X-Ray and Imaging **AND** Blood draws through PICC line; draws by RN only **AND** FLUSHING GUIDELINES WHEN IN USE **AND** normal saline PF **AND** FLUSHING GUIDELINES WHEN NOT IN USE **AND** DRESSING MAINTENANCE **AND** Change needleless pressure ports and IV tubing every 72 hours per hospital policy **AND** TUBING **AND** If there is an MD order to remove the PICC line, any RN may remove the PICC line **AND** [] PATIENT EDUCATION MATERIALS **AND** NURSING COMMUNICATION  •  DAPTOmycin  •  atorvastatin  •  clopidogrel  •  enoxaparin  •  furosemide  •  gabapentin  •  isosorbide dinitrate  •  NIFEdipine SR  •  omeprazole  •  pentoxifylline CR  •  senna-docusate **AND** polyethylene glycol/lytes **AND** magnesium hydroxide **AND** bisacodyl  •  Respiratory Care per Protocol  •  acetaminophen  •  insulin regular **AND** Accu-Chek ACHS **AND** NOTIFY MD and PharmD  **AND** glucose 4 g **AND** dextrose 50%  •  ondansetron  •  ondansetron  •  promethazine  •  promethazine  •  prochlorperazine    Labs:  No results for input(s): WBC, RBC, HEMOGLOBIN, HEMATOCRIT, MCV, MCH, RDW, PLATELETCT, MPV, NEUTSPOLYS, LYMPHOCYTES, MONOCYTES, EOSINOPHILS, BASOPHILS, RBCMORPHOLO in the last 72 hours.  No results for input(s): SODIUM, POTASSIUM, CHLORIDE, CO2, GLUCOSE, BUN, CPKTOTAL in the last 72 hours.  No results for input(s): ALBUMIN, TBILIRUBIN, ALKPHOSPHAT, TOTPROTEIN, ALTSGPT, ASTSGOT, CREATININE in the last 72 hours.    Imaging:  Dx-foot-complete 3+ Right    Result Date: 2017 1:11 PM HISTORY/REASON FOR EXAM:  Atraumatic Pain/Swelling/Deformity. TECHNIQUE/EXAM DESCRIPTION:  3 views RIGHT foot. COMPARISON:  None. FINDINGS:  There is soft tissue swelling and soft tissue gas adjacent to the distal right fourth metatarsal and right fourth toe. There is destruction of the distal right fourth metatarsal and base of the proximal phalanx. There may be erosive changes involving the margins of the fifth MTP joint. No other destructive lesions no acute fracture identified.     Distal lateral soft tissue swelling and gas consistent with infection. Destruction of the distal fourth metatarsal base of the proximal phalanx consistent with osteomyelitis. Possible osteomyelitis involving the distal fifth metatarsal base of the fifth proximal phalanx.    [facility]/imag    Result Date: 2017  Lower Extremity  Arterial Duplex Report  Vascular Laboratory  CONCLUSIONS  BAILEY FARNSWORTH  Exam Date:     2017 10:50  Room #:     Inpatient  Priority:     Routine  Ht (in):             Wt (lb):  Ordering Physician:        CORBIN MENDENHALL  Referring Physician:       CORBIN MENDENHALL  Sonographer:               Steffi Boyle RVT  Study Type:                Complete Bilateral  Technical Quality:         Adequate  Age:    62    Gender:     M  MRN:    [id number]  :    1955      BSA:   Indications:     Claudication  CPT Codes:       08754  ICD Codes:       I70.213  History:         Unhealing wound on ball of right foot x4 months. History of                   diabetes.  Limitations:                RIGHT  Waveform        Peak Systolic Velocity (cm/s)                  Prox    Prox-Mid  Mid    Mid-Dist  Distal  Triphasic                         139                      CFA  Biphasic        121                                        PFA  Triphasic       164               116              85      SFA  Triphasic                         78                       POP  Bi, non-        155                                139     AT  reversed  Monophasic      19                                 17      PT  Absent          0                                  41      SHANNON                LEFT  Waveform        Peak Systolic Velocity (cm/s)                  Prox    Prox-Mid  Mid    Mid-Dist  Distal  Triphasic                         141                      CFA  Triphasic       120                                        PFA  Triphasic       129               98               70      SFA  Triphasic                         55                       POP  Bi, non-        59                                 78      AT  reversed  Bi, non-        65                                 23      PT  reversed  Absent          75                                 0       SHANNON  FINDINGS  Right.  Diffuse plaque is seen in the common femoral, profunda femoral, femoral,  and popliteal arteries without evidence of hemodynamically significant  stenosis.  No flow can be demonstrated in the proximal peroneal artery.  Waveforms at the posterior tibial are monophasic with severly reduced  amplitude.  Waveforms at the anterior tibial artery are biphasic.  Left.  Diffuse plaque is seen in the common femoral, profunda femoral, femoral,  and popliteal arteries without evidence of hemodynamically significant  stenosis.  No flow can be demonstrated in  the distal peroneal artery.  Waveforms at the posterior tibial and anterior tibial biphasic.     [facility] Art/katja    Result Date: 2017   Vascular Laboratory  Conclusions  BAILEY FARNSWORTH  Age:    62    Gender:     M  MRN:    [id number]  :    1955      BSA:  Exam Date:     2017 12:43  Room #:     Inpatient  Priority:     Routine  Ht (in):             Wt (lb):  Ordering Physician:        CORBIN MENDENHALL  Referring Physician:       CORBIN MENDENHALL  Sonographer:               Steffi Boyle RVT  Study Type:                Limited Bilateral  Technical Quality:         Adequate  Indications:     Claudication  CPT Codes:       92473  ICD Codes:       I70.213  History:         Unhealing wound on ball of right foot. Diabetes mellitus.  Limitations:     IV in left arm.                 RIGHT  Waveform            Systolic BPs (mmHg)                             112           Brachial  Triphasic                                Common Femoral  Monophasic                 76            Posterior Tibial  Bi, non-                   118           Dorsalis Pedis  reversed                                           Peroneal                             1.05          KATJA                                           TBI                       LEFT  Waveform        Systolic BPs (mmHg)                                           Brachial  Triphasic                                Common Femoral  Bi, non-                   0             Posterior Tibial  reversed  Bi, non-                   100           Dorsalis Pedis  reversed                                           Peroneal                             0.89          KATJA                                           TBI  Findings  Right.  Ankle brachial index is 1.05.  Doppler waveform of the common femoral and popliteal arteries are of high  amplitude and triphasic.  Doppler waveforms of the posterior tibial at the ankle are monophasic.  Doppler waveforms at the ankle are brisk and biphasic.   "Left.  Ankle brachial index is 0.89 for the dorsalis pedis artery.  Ankle brachial index for the posterior tibial artery is non-compressible.  Doppler waveform of the common femoral artery is of high amplitude and  triphasic.  Doppler waveforms at the ankle are brisk and biphasic.  Arterial duplex scan was performed in accordance with lower extremity  arterial evaluation protocol - see separate report.       Micro:  Results     Procedure Component Value Units Date/Time    BLOOD CULTURE [[id number]] Collected:  12/29/17 1305    Order Status:  Completed Specimen:  Blood from Peripheral Updated:  01/03/18 1500     Significant Indicator NEG     Source BLD     Site PERIPHERAL     Blood Culture No growth after 5 days of incubation.    Narrative:       Per Hospital Policy: Only change Specimen Src: to \"Line\" if  specified by physician order.    BLOOD CULTURE [[id number]] Collected:  12/29/17 1304    Order Status:  Completed Specimen:  Blood from Peripheral Updated:  01/03/18 1500     Significant Indicator NEG     Source BLD     Site PERIPHERAL     Blood Culture No growth after 5 days of incubation.    Narrative:       Per Hospital Policy: Only change Specimen Src: to \"Line\" if  specified by physician order.    CULTURE TISSUE W/ GRM STAIN [[id number]]  (Abnormal)  (Susceptibility) Collected:  12/29/17 1736    Order Status:  Completed Specimen:  Tissue Updated:  01/02/18 0909     Significant Indicator POS (POS)     Source TISS     Site 5th Metatarsal Head     Tissue Culture -- (A)     Growth noted after further incubation,see below for  organism identification.       Gram Stain Result --     Rare WBCs.  No organisms seen.       Tissue Culture -- (A)     Staphylococcus epidermidis  Rare growth       Tissue Culture -- (A)     Diphtheroids  Rare growth      Culture & Susceptibility     STAPHYLOCOCCUS EPIDERMIDIS     Antibiotic Sensitivity Microscan Unit Status    Ampicillin/sulbactam Resistant 16/8 mcg/mL Final    Clindamycin " Sensitive <=0.5 mcg/mL Final    Daptomycin Sensitive <=0.5 mcg/mL Final    Erythromycin Resistant >4 mcg/mL Final    Moxifloxacin Resistant >4 mcg/mL Final    Oxacillin Resistant >2 mcg/mL Final    Penicillin Resistant >8 mcg/mL Final    Tetracycline Resistant >8 mcg/mL Final    Trimeth/Sulfa Resistant >2/38 mcg/mL Final    Vancomycin Sensitive 2 mcg/mL Final                       ANAEROBIC CULTURE [[id number]] Collected:  12/29/17 1736    Order Status:  Completed Specimen:  Tissue Updated:  01/02/18 0909     Significant Indicator NEG     Source TISS     Site 5th Metatarsal Head     Anaerobic Culture, Culture Res No Anaerobes isolated.    CULTURE TISSUE W/ GRM STAIN [[id number]]  (Abnormal)  (Susceptibility) Collected:  12/29/17 1730    Order Status:  Completed Specimen:  Tissue Updated:  01/02/18 0909     Gram Stain Result --     Few WBCs.  No organisms seen.       Significant Indicator POS (POS)     Source TISS     Site 4th Metatarsal Head     Tissue Culture -- (A)     Growth noted after further incubation,see below for  organism identification.       Tissue Culture -- (A)     Diphtheroids  Light growth       Tissue Culture -- (A)     Staphylococcus epidermidis  Light growth      Culture & Susceptibility     STAPHYLOCOCCUS EPIDERMIDIS     Antibiotic Sensitivity Microscan Unit Status    Ampicillin/sulbactam Resistant <=8/4 mcg/mL Final    Clindamycin Sensitive <=0.5 mcg/mL Final    Daptomycin Sensitive <=0.5 mcg/mL Final    Erythromycin Resistant >4 mcg/mL Final    Moxifloxacin Resistant >4 mcg/mL Final    Oxacillin Resistant >2 mcg/mL Final    Penicillin Resistant >8 mcg/mL Final    Tetracycline Resistant >8 mcg/mL Final    Trimeth/Sulfa Resistant >2/38 mcg/mL Final    Vancomycin Sensitive 2 mcg/mL Final                       ANAEROBIC CULTURE [[id number]] Collected:  12/29/17 1730    Order Status:  Completed Specimen:  Tissue Updated:  01/02/18 0909     Significant Indicator NEG     Source TISS     Site 4th  Metatarsal Head     Anaerobic Culture, Culture Res No Anaerobes isolated.          Assessment:  Active Hospital Problems    Diagnosis   • *Osteomyelitis of right foot (CMS-HCC) [M86.9]   • Chronic anticoagulation [Z79.01]   • CHF (congestive heart failure) (CMS-HCC) [I50.9]   • COPD (chronic obstructive pulmonary disease) (CMS-HCC) [J44.9]   • Coronary artery disease [I25.10]   • DM (diabetes mellitus) (CMS-HCC) [E11.9]   • Essential hypertension [I10]   • PAOD (peripheral arterial occlusive disease) (CMS-HCC) [I77.9]       Plan:  Diabetic foot ulcer with osteomyelitis  Afebrile  No leukocytosis  Status post I&D and right fourth and fifth metatarsal head and proximal phalanx excision on 12/29/17  Cultures +MRSE and diphtheroids  Continue daptomycin  Aim  for 6 weeks post-op-can do Zyvox PO last 2 weeks  Stop date 2/9/2018    Diabetes mellitus  Keep BS under 150 to help control current infection  HgA1c 9.7 (12/28/17)    Placement

## 2018-01-09 VITALS
WEIGHT: 216.49 LBS | HEIGHT: 69 IN | HEART RATE: 61 BPM | SYSTOLIC BLOOD PRESSURE: 155 MMHG | TEMPERATURE: 97.5 F | BODY MASS INDEX: 32.07 KG/M2 | DIASTOLIC BLOOD PRESSURE: 66 MMHG | RESPIRATION RATE: 18 BRPM | OXYGEN SATURATION: 96 %

## 2018-01-09 LAB
CK SERPL-CCNC: 37 U/L (ref 0–154)
GLUCOSE BLD-MCNC: 180 MG/DL (ref 65–99)
GLUCOSE BLD-MCNC: 229 MG/DL (ref 65–99)
INR PPP: 1.54 (ref 0.87–1.13)
PROTHROMBIN TIME: 18.2 SEC (ref 12–14.6)

## 2018-01-09 PROCEDURE — 700102 HCHG RX REV CODE 250 W/ 637 OVERRIDE(OP): Performed by: INTERNAL MEDICINE

## 2018-01-09 PROCEDURE — 85610 PROTHROMBIN TIME: CPT

## 2018-01-09 PROCEDURE — 700111 HCHG RX REV CODE 636 W/ 250 OVERRIDE (IP): Performed by: INTERNAL MEDICINE

## 2018-01-09 PROCEDURE — A9270 NON-COVERED ITEM OR SERVICE: HCPCS | Performed by: INTERNAL MEDICINE

## 2018-01-09 PROCEDURE — 82550 ASSAY OF CK (CPK): CPT

## 2018-01-09 PROCEDURE — 82962 GLUCOSE BLOOD TEST: CPT

## 2018-01-09 PROCEDURE — 700111 HCHG RX REV CODE 636 W/ 250 OVERRIDE (IP): Mod: JG | Performed by: INTERNAL MEDICINE

## 2018-01-09 PROCEDURE — 99239 HOSP IP/OBS DSCHRG MGMT >30: CPT | Performed by: INTERNAL MEDICINE

## 2018-01-09 RX ORDER — OXYCODONE HYDROCHLORIDE 10 MG/1
10 TABLET ORAL EVERY 6 HOURS PRN
Qty: 28 TAB | Refills: 0 | Status: SHIPPED | OUTPATIENT
Start: 2018-01-09 | End: 2018-01-16

## 2018-01-09 RX ORDER — INSULIN GLARGINE 100 [IU]/ML
32 INJECTION, SOLUTION SUBCUTANEOUS EVERY EVENING
Qty: 10 ML
Start: 2018-01-09

## 2018-01-09 RX ORDER — FUROSEMIDE 20 MG/1
10 TABLET ORAL DAILY
Qty: 60 TAB
Start: 2018-01-10

## 2018-01-09 RX ORDER — ONDANSETRON 4 MG/1
4 TABLET, ORALLY DISINTEGRATING ORAL EVERY 4 HOURS PRN
Qty: 10 TAB | Refills: 0 | Status: SHIPPED | OUTPATIENT
Start: 2018-01-09

## 2018-01-09 RX ORDER — NIFEDIPINE 30 MG
30 TABLET, EXTENDED RELEASE ORAL DAILY
Qty: 30 TAB
Start: 2018-01-10

## 2018-01-09 RX ORDER — WARFARIN SODIUM 7.5 MG/1
15 TABLET ORAL
Status: DISCONTINUED | OUTPATIENT
Start: 2018-01-09 | End: 2018-01-09 | Stop reason: HOSPADM

## 2018-01-09 RX ORDER — ACETAMINOPHEN 325 MG/1
650 TABLET ORAL EVERY 6 HOURS PRN
Qty: 30 TAB | Refills: 0 | Status: SHIPPED | OUTPATIENT
Start: 2018-01-09

## 2018-01-09 RX ADMIN — OXYCODONE HYDROCHLORIDE 10 MG: 10 TABLET ORAL at 02:19

## 2018-01-09 RX ADMIN — PENTOXIFYLLINE 400 MG: 400 TABLET, FILM COATED, EXTENDED RELEASE ORAL at 12:19

## 2018-01-09 RX ADMIN — METFORMIN HYDROCHLORIDE 850 MG: 850 TABLET, FILM COATED ORAL at 12:19

## 2018-01-09 RX ADMIN — OXYCODONE HYDROCHLORIDE 10 MG: 10 TABLET ORAL at 14:58

## 2018-01-09 RX ADMIN — INSULIN HUMAN 1 UNITS: 100 INJECTION, SOLUTION PARENTERAL at 05:41

## 2018-01-09 RX ADMIN — STANDARDIZED SENNA CONCENTRATE AND DOCUSATE SODIUM 2 TABLET: 8.6; 5 TABLET, FILM COATED ORAL at 08:49

## 2018-01-09 RX ADMIN — OMEPRAZOLE 20 MG: 20 CAPSULE, DELAYED RELEASE ORAL at 08:49

## 2018-01-09 RX ADMIN — POLYETHYLENE GLYCOL (3350) 1 PACKET: 17 POWDER, FOR SOLUTION ORAL at 08:49

## 2018-01-09 RX ADMIN — FUROSEMIDE 10 MG: 20 TABLET ORAL at 08:49

## 2018-01-09 RX ADMIN — INSULIN HUMAN 2 UNITS: 100 INJECTION, SOLUTION PARENTERAL at 12:27

## 2018-01-09 RX ADMIN — DAPTOMYCIN 600 MG: 500 INJECTION, POWDER, LYOPHILIZED, FOR SOLUTION INTRAVENOUS at 14:58

## 2018-01-09 RX ADMIN — GABAPENTIN 100 MG: 100 CAPSULE ORAL at 08:49

## 2018-01-09 RX ADMIN — GABAPENTIN 100 MG: 100 CAPSULE ORAL at 14:58

## 2018-01-09 RX ADMIN — ENOXAPARIN SODIUM 100 MG: 100 INJECTION SUBCUTANEOUS at 08:49

## 2018-01-09 RX ADMIN — ISOSORBIDE DINITRATE 20 MG: 10 TABLET ORAL at 08:49

## 2018-01-09 RX ADMIN — PENTOXIFYLLINE 400 MG: 400 TABLET, FILM COATED, EXTENDED RELEASE ORAL at 08:49

## 2018-01-09 RX ADMIN — NIFEDIPINE 30 MG: 30 TABLET, FILM COATED, EXTENDED RELEASE ORAL at 08:49

## 2018-01-09 ASSESSMENT — ENCOUNTER SYMPTOMS
COUGH: 0
SHORTNESS OF BREATH: 0
FOCAL WEAKNESS: 0
DIZZINESS: 0
FEVER: 0
VOMITING: 0
MYALGIAS: 1
ABDOMINAL PAIN: 0

## 2018-01-09 ASSESSMENT — PAIN SCALES - GENERAL
PAINLEVEL_OUTOF10: 0
PAINLEVEL_OUTOF10: 7
PAINLEVEL_OUTOF10: 4

## 2018-01-09 NOTE — DISCHARGE PLANNING
Per the request of the SW on floor the referral has been resent to [facility]

## 2018-01-09 NOTE — PROGRESS NOTES
"Infectious Disease Progress Note    Author: Cece Zaragoza M.D. Date & Time of service: 2018  10:22 AM    Chief Complaint:  Diabetic foot infection    Interval History:  -MAXIMUM TEMPERATURE 98.4 WBC 11.7 creatinine 0.85. No new issues overnight  17-MAXIMUM TEMPERATURE 98.4. No new issues overnight  2018-MAXIMUM TEMPERATURE 98.8. No new labs available. Anxious to go home   AF no PICC yet-tolerated dressing change well  1/3 AF got PICC-does not want to go to SNF. Denies SE abx   AF difficulties with placement-eating well   AF no complaints States \"I am trying to be pleasant\". Using boot   AF states nausea in the morning but eating full meals   AF had some dizzyness with getting up to BR otherwise feels fine   AF, wants to go home, asking why he cannot do home IV abx, no new symptoms   AF, pt very chatty and wants to be discharged, denies any foot pain, states only painful if boot on too tight, normal BMs  Labs Reviewed, Medications Reviewed, Radiology Reviewed and Wound Reviewed.    Review of Systems:  Review of Systems   Constitutional: Negative for fever.   HENT: Negative for hearing loss.    Respiratory: Negative for cough and shortness of breath.    Cardiovascular: Negative for chest pain.   Gastrointestinal: Negative for abdominal pain and vomiting.   Genitourinary: Negative for dysuria.   Musculoskeletal: Positive for myalgias.   Neurological: Negative for dizziness and focal weakness.   All other systems reviewed and are negative.      Hemodynamics:  Temp (24hrs), Av.2 °C (97.2 °F), Min:35.7 °C (96.3 °F), Max:36.6 °C (97.9 °F)  Temperature: 36.2 °C (97.2 °F)  Pulse  Av.8  Min: 45  Max: 73   Blood Pressure: 121/69       Physical Exam:  Physical Exam   Constitutional: He is oriented to person, place, and time. He appears well-developed and well-nourished. No distress.   HENT:   Head: Normocephalic and atraumatic.   Eyes: EOM are normal. Pupils are equal, " round, and reactive to light. No scleral icterus.   Neck: Neck supple.   Cardiovascular: Normal rate and regular rhythm.    No murmur heard.  Pulmonary/Chest: Effort normal. No respiratory distress. He has no wheezes. He has no rales.   Abdominal: Soft. He exhibits no distension. There is no tenderness.   Musculoskeletal: He exhibits edema.   LUE PICC no erythema  Right foot dressed and in boot   Neurological: He is alert and oriented to person, place, and time.   Skin: No erythema.   Nursing note and vitals reviewed.      Meds:    Current Facility-Administered Medications:   •  metformin  •  insulin glargine  •  warfarin  •  MD ALERT... warfarin  •  oxycodone immediate-release **OR** oxycodone immediate-release  •  PICC Line Insertion has been implemented **AND** May use Lidocaine 1% not to exceed 3 mls for local at insertion site **AND** NOTIFY MD **AND** Tip to dwell in the superior vena cava **AND** Do not use PICC Line until placement verified by Chest X Ray **AND** DX-CHEST-FOR PICC LINE Perform procedure in: PICC Room **AND** If radiologist reading of chest X-ray states any of the following the PICC should be used **AND** Further evaluation of the PICC placement can be retrieved from X-Ray and Imaging **AND** Blood draws through PICC line; draws by RN only **AND** FLUSHING GUIDELINES WHEN IN USE **AND** normal saline PF **AND** FLUSHING GUIDELINES WHEN NOT IN USE **AND** DRESSING MAINTENANCE **AND** Change needleless pressure ports and IV tubing every 72 hours per hospital policy **AND** TUBING **AND** If there is an MD order to remove the PICC line, any RN may remove the PICC line **AND** [] PATIENT EDUCATION MATERIALS **AND** NURSING COMMUNICATION  •  DAPTOmycin  •  atorvastatin  •  clopidogrel  •  enoxaparin  •  furosemide  •  gabapentin  •  isosorbide dinitrate  •  NIFEdipine SR  •  omeprazole  •  pentoxifylline CR  •  senna-docusate **AND** polyethylene glycol/lytes **AND** magnesium hydroxide  **AND** bisacodyl  •  Respiratory Care per Protocol  •  acetaminophen  •  insulin regular **AND** Accu-Chek ACHS **AND** NOTIFY MD and PharmD **AND** glucose 4 g **AND** dextrose 50%  •  ondansetron  •  ondansetron  •  promethazine  •  promethazine  •  prochlorperazine    Labs:  No results for input(s): WBC, RBC, HEMOGLOBIN, HEMATOCRIT, MCV, MCH, RDW, PLATELETCT, MPV, NEUTSPOLYS, LYMPHOCYTES, MONOCYTES, EOSINOPHILS, BASOPHILS, RBCMORPHOLO in the last 72 hours.  Recent Labs      01/09/18   0216   CPKTOTAL  37     No results for input(s): ALBUMIN, TBILIRUBIN, ALKPHOSPHAT, TOTPROTEIN, ALTSGPT, ASTSGOT, CREATININE in the last 72 hours.    Imaging:  Dx-foot-complete 3+ Right    Result Date: 12/28/2017 12/28/2017 1:11 PM HISTORY/REASON FOR EXAM:  Atraumatic Pain/Swelling/Deformity. TECHNIQUE/EXAM DESCRIPTION:  3 views RIGHT foot. COMPARISON:  None. FINDINGS:  There is soft tissue swelling and soft tissue gas adjacent to the distal right fourth metatarsal and right fourth toe. There is destruction of the distal right fourth metatarsal and base of the proximal phalanx. There may be erosive changes involving the margins of the fifth MTP joint. No other destructive lesions no acute fracture identified.     Distal lateral soft tissue swelling and gas consistent with infection. Destruction of the distal fourth metatarsal base of the proximal phalanx consistent with osteomyelitis. Possible osteomyelitis involving the distal fifth metatarsal base of the fifth proximal phalanx.    Le Art Duplx/imag    Result Date: 12/29/2017  Lower Extremity  Arterial Duplex Report  Vascular Laboratory  CONCLUSIONS  BAILEY FARNSWORTH  Exam Date:     12/29/2017 10:50  Room #:     Inpatient  Priority:     Routine  Ht (in):             Wt (lb):  Ordering Physician:        CORBIN MENDENHALL  Referring Physician:       CORBIN MENDENHALL  Sonographer:               Steffi Boyle RVT  Study Type:                Complete Bilateral  Technical Quality:          Adequate  Age:    62    Gender:     M  MRN:    [id number]  :    1955      BSA:  Indications:     Claudication  CPT Codes:       28721  ICD Codes:       I70.213  History:         Unhealing wound on ball of right foot x4 months. History of                   diabetes.  Limitations:                RIGHT  Waveform        Peak Systolic Velocity (cm/s)                  Prox    Prox-Mid  Mid    Mid-Dist  Distal  Triphasic                         139                      CFA  Biphasic        121                                        PFA  Triphasic       164               116              85      SFA  Triphasic                         78                       POP  Bi, non-        155                                139     AT  reversed  Monophasic      19                                 17      PT  Absent          0                                  41      SHANNON                LEFT  Waveform        Peak Systolic Velocity (cm/s)                  Prox    Prox-Mid  Mid    Mid-Dist  Distal  Triphasic                         141                      CFA  Triphasic       120                                        PFA  Triphasic       129               98               70      SFA  Triphasic                         55                       POP  Bi, non-        59                                 78      AT  reversed  Bi, non-        65                                 23      PT  reversed  Absent          75                                 0       SHANNON  FINDINGS  Right.  Diffuse plaque is seen in the common femoral, profunda femoral, femoral,  and popliteal arteries without evidence of hemodynamically significant  stenosis.  No flow can be demonstrated in the proximal peroneal artery.  Waveforms at the posterior tibial are monophasic with severly reduced  amplitude.  Waveforms at the anterior tibial artery are biphasic.  Left.  Diffuse plaque is seen in the common femoral, profunda femoral, femoral,  and popliteal arteries  without evidence of hemodynamically significant  stenosis.  No flow can be demonstrated in the distal peroneal artery.  Waveforms at the posterior tibial and anterior tibial biphasic.     Le Art/katja    Result Date: 2017   Vascular Laboratory  Conclusions  BAILEY FARNSWORTH  Age:    62    Gender:     M  MRN:    [id number]  :    1955      BSA:  Exam Date:     2017 12:43  Room #:     Inpatient  Priority:     Routine  Ht (in):             Wt (lb):  Ordering Physician:        CORBIN MENDENHALL  Referring Physician:       CORBIN MENDENHALL  Sonographer:               Steffi Boyle RVT  Study Type:                Limited Bilateral  Technical Quality:         Adequate  Indications:     Claudication  CPT Codes:       51821  ICD Codes:       I70.213  History:         Unhealing wound on ball of right foot. Diabetes mellitus.  Limitations:     IV in left arm.                 RIGHT  Waveform            Systolic BPs (mmHg)                             112           Brachial  Triphasic                                Common Femoral  Monophasic                 76            Posterior Tibial  Bi, non-                   118           Dorsalis Pedis  reversed                                           Peroneal                             1.05          KATJA                                           TBI                       LEFT  Waveform        Systolic BPs (mmHg)                                           Brachial  Triphasic                                Common Femoral  Bi, non-                   0             Posterior Tibial  reversed  Bi, non-                   100           Dorsalis Pedis  reversed                                           Peroneal                             0.89          KATJA                                           TBI  Findings  Right.  Ankle brachial index is 1.05.  Doppler waveform of the common femoral and popliteal arteries are of high  amplitude and triphasic.  Doppler waveforms of the posterior  "tibial at the ankle are monophasic.  Doppler waveforms at the ankle are brisk and biphasic.  Left.  Ankle brachial index is 0.89 for the dorsalis pedis artery.  Ankle brachial index for the posterior tibial artery is non-compressible.  Doppler waveform of the common femoral artery is of high amplitude and  triphasic.  Doppler waveforms at the ankle are brisk and biphasic.  Arterial duplex scan was performed in accordance with lower extremity  arterial evaluation protocol - see separate report.       Micro:  Results     Procedure Component Value Units Date/Time    BLOOD CULTURE [[id number]] Collected:  12/29/17 1305    Order Status:  Completed Specimen:  Blood from Peripheral Updated:  01/03/18 1500     Significant Indicator NEG     Source BLD     Site PERIPHERAL     Blood Culture No growth after 5 days of incubation.    Narrative:       Per Hospital Policy: Only change Specimen Src: to \"Line\" if  specified by physician order.    BLOOD CULTURE [[id number]] Collected:  12/29/17 1304    Order Status:  Completed Specimen:  Blood from Peripheral Updated:  01/03/18 1500     Significant Indicator NEG     Source BLD     Site PERIPHERAL     Blood Culture No growth after 5 days of incubation.    Narrative:       Per Hospital Policy: Only change Specimen Src: to \"Line\" if  specified by physician order.          Assessment:  Active Hospital Problems    Diagnosis   • *Osteomyelitis of right foot (CMS-HCC) [M86.9]   • Chronic anticoagulation [Z79.01]   • CHF (congestive heart failure) (CMS-HCC) [I50.9]   • COPD (chronic obstructive pulmonary disease) (CMS-HCC) [J44.9]   • Coronary artery disease [I25.10]   • DM (diabetes mellitus) (CMS-HCC) [E11.9]   • Essential hypertension [I10]   • PAOD (peripheral arterial occlusive disease) (CMS-HCC) [I77.9]       Plan:  Diabetic foot ulcer with osteomyelitis  Afebrile  No leukocytosis  Status post I&D and right fourth and fifth metatarsal head and proximal phalanx excision on " 12/29/17  Cultures +MRSE and diphtheroids  Continue daptomycin  Aim  for 6 weeks post-op-can do Zyvox PO last 2 weeks  Stop date 2/9/2018    Diabetes mellitus  Keep BS under 150 to help control current infection  HgA1c 9.7 (12/28/17)    SNF Placement - pt accepted to [facility] today    OK to transfer from ID standpoint    FU ID clinic    DW Dr. Noel and MSW. ID signing off.

## 2018-01-09 NOTE — PROGRESS NOTES
"Pt A&Ox4, complaining of 10/10 R foot pain, RN helped reposition and gave Oxy per MAR. Normal S1 and S2 heart sounds, respirations are even and unlabored with clear breath sounds on RA, abdomen is soft, nontender with positive bowel sounds. Last BM 1/6, pt denies constipation and requesting to only take stool softners at this time. Dressing to RLE wounds changed earlier today, CDI. Pt ambulating in halls and to BR with no RLE boot, tolerating well.  POC discussed with pt, no further questions at this time, no s/s of distress, call light within reach, will continue to monitor.    /80   Pulse 70   Temp 36.6 °C (97.8 °F)   Resp 18   Ht 1.753 m (5' 9\")   Wt 98.2 kg (216 lb 7.9 oz)   SpO2 98%   BMI 31.97 kg/m²     "

## 2018-01-09 NOTE — PROGRESS NOTES
Inpatient Anticoagulation Service Note    Date: 1/9/2018  Reason for Anticoagulation: Deep Vein Thrombosis        Hemoglobin Value: (!) 10.6  Hematocrit Value: (!) 32.7  Lab Platelet Value: 174  Target INR: 2.0 to 3.0    INR from last 7 days     Date/Time INR Value    01/09/18 0216 (!)  1.54    01/08/18 0312 (!)  1.52    01/07/18 0118 (!)  1.28    01/06/18 1014  1.12    01/05/18 0358  1.03        Dose from last 7 days     Date/Time Dose (mg)    01/09/18 0216  15    01/08/18 1300  10    01/07/18 0118  10    01/06/18 1300  10    01/05/18 1200  15    01/04/18 1400  10        Average Dose (mg):  (Home dose = 10 mg daily)  Significant Interactions: Clopidogrel, Statin, Proton Pump Inhibitor, Antiplatelet Medications, Antibiotics  Bridge Therapy: Yes (Lovenox 100 mg SQ BID)   INR Value Greater than 2 Prior to Discontinuation of Parenteral Anticoagulation: Not Applicable     Comments: INR was stagnant overnight.  I have ordered another bolus 15 mg warfarin dose for tonight.    Plan:  Patient is transferring to [facility] at 1500 today.  Education Material Provided?: No (Chronic warfarin patient )  Pharmacist suggested discharge dosing: 10 - 15 mg daily.     Serenity Roth

## 2018-01-09 NOTE — DISCHARGE PLANNING
CCS spoke to Olamide at [facility] the referral has been accepted. Transportation has been arranged to transfer the patient today at 1500 via [employer]. SW on floor has been notified via Skype.

## 2018-01-09 NOTE — DISCHARGE INSTRUCTIONS
Discharge Instructions    Discharged to other by medical transportation with escort. Discharged via wheelchair, hospital escort: Yes.  Special equipment needed: Not Applicable    Be sure to schedule a follow-up appointment with your primary care doctor or any specialists as instructed.     Discharge Plan:   Diet Plan: Discussed  Activity Level: Discussed  Smoking Cessation Offered: Patient Refused  Confirmed Follow up Appointment: Patient to Call and Schedule Appointment  Confirmed Symptoms Management: Discussed  Medication Reconciliation Updated: Yes  Influenza Vaccine Indication: Not indicated: Previously immunized this influenza season and > 8 years of age    I understand that a diet low in cholesterol, fat, and sodium is recommended for good health. Unless I have been given specific instructions below for another diet, I accept this instruction as my diet prescription.   Other diet: Diabetic cardiac    Special Instructions: None    · Is patient discharged on Warfarin / Coumadin?   Yes    You are receiving the drug warfarin. Please understand the importance of monitoring warfarin with scheduled PT/INR blood draws.  Follow[facility]up with the [facility] in one week for INR lab..    IMPORTANT: HOW TO USE THIS INFORMATION:  This is a summary and does NOT have all possible information about this product. This information does not assure that this product is safe, effective, or appropriate for you. This information is not individual medical advice and does not substitute for the advice of your health care professional. Always ask your health care professional for complete information about this product and your specific health needs.      WARFARIN - ORAL (WARF-uh-rin)      COMMON BRAND NAME(S): Coumadin      WARNING:  Warfarin can cause very serious (possibly fatal) bleeding. This is more likely to occur when you first start taking this medication or if you take too much warfarin. To decrease your risk for bleeding, your  "doctor or other health care provider will monitor you closely and check your lab results (INR test) to make sure you are not taking too much warfarin. Keep all medical and laboratory appointments. Tell your doctor right away if you notice any signs of serious bleeding. See also Side Effects section.      USES:  This medication is used to treat blood clots (such as in deep vein thrombosis-DVT or pulmonary embolus-PE) and/or to prevent new clots from forming in your body. Preventing harmful blood clots helps to reduce the risk of a stroke or heart attack. Conditions that increase your risk of developing blood clots include a certain type of irregular heart rhythm (atrial fibrillation), heart valve replacement, recent heart attack, and certain surgeries (such as hip/knee replacement). Warfarin is commonly called a \"blood thinner,\" but the more correct term is \"anticoagulant.\" It helps to keep blood flowing smoothly in your body by decreasing the amount of certain substances (clotting proteins) in your blood.      HOW TO USE:  Read the Medication Guide provided by your pharmacist before you start taking warfarin and each time you get a refill. If you have any questions, ask your doctor or pharmacist. Take this medication by mouth with or without food as directed by your doctor or other health care professional, usually once a day. It is very important to take it exactly as directed. Do not increase the dose, take it more frequently, or stop using it unless directed by your doctor. Dosage is based on your medical condition, laboratory tests (such as INR), and response to treatment. Your doctor or other health care provider will monitor you closely while you are taking this medication to determine the right dose for you. Use this medication regularly to get the most benefit from it. To help you remember, take it at the same time each day. It is important to eat a balanced, consistent diet while taking warfarin. Some foods " can affect how warfarin works in your body and may affect your treatment and dose. Avoid sudden large increases or decreases in your intake of foods high in vitamin K (such as broccoli, cauliflower, cabbage, brussels sprouts, kale, spinach, and other green leafy vegetables, liver, green tea, certain vitamin supplements). If you are trying to lose weight, check with your doctor before you try to go on a diet. Cranberry products may also affect how your warfarin works. Limit the amount of cranberry juice (16 ounces/480 milliliters a day) or other cranberry products you may drink or eat.      SIDE EFFECTS:  Nausea, loss of appetite, or stomach/abdominal pain may occur. If any of these effects persist or worsen, tell your doctor or pharmacist promptly. Remember that your doctor has prescribed this medication because he or she has judged that the benefit to you is greater than the risk of side effects. Many people using this medication do not have serious side effects. This medication can cause serious bleeding if it affects your blood clotting proteins too much (shown by unusually high INR lab results). Even if your doctor stops your medication, this risk of bleeding can continue for up to a week. Tell your doctor right away if you have any signs of serious bleeding, including: unusual pain/swelling/discomfort, unusual/easy bruising, prolonged bleeding from cuts or gums, persistent/frequent nosebleeds, unusually heavy/prolonged menstrual flow, pink/dark urine, coughing up blood, vomit that is bloody or looks like coffee grounds, severe headache, dizziness/fainting, unusual or persistent tiredness/weakness, bloody/black/tarry stools, chest pain, shortness of breath, difficulty swallowing. Tell your doctor right away if any of these unlikely but serious side effects occur: persistent nausea/vomiting, severe stomach/abdominal pain, yellowing eyes/skin. This drug rarely has caused very serious (possibly fatal) problems if  its effects lead to small blood clots (usually at the beginning of treatment). This can lead to severe skin/tissue damage that may require surgery or amputation if left untreated. Patients with certain blood conditions (protein C or S deficiency) may be at greater risk. Get medical help right away if any of these rare but serious side effects occur: painful/red/purplish patches on the skin (such as on the toe, breast, abdomen), change in the amount of urine, vision changes, confusion, slurred speech, weakness on one side of the body. A very serious allergic reaction to this drug is rare. However, get medical help right away if you notice any symptoms of a serious allergic reaction, including: rash, itching/swelling (especially of the face/tongue/throat), severe dizziness, trouble breathing. This is not a complete list of possible side effects. If you notice other effects not listed above, contact your doctor or pharmacist. In the [place] - Call your doctor for medical advice about side effects. You may report side effects to FDA at 2-335-RYQ-0139. In [place] - Call your doctor for medical advice about side effects. You may report side effects to [facility] [place] at 1-698.716.7523.      PRECAUTIONS:  Before taking warfarin, tell your doctor or pharmacist if you are allergic to it; or if you have any other allergies. This product may contain inactive ingredients, which can cause allergic reactions or other problems. Talk to your pharmacist for more details. Before using this medication, tell your doctor or pharmacist your medical history, especially of: blood disorders (such as anemia, hemophilia), bleeding problems (such as bleeding of the stomach/intestines, bleeding in the brain), blood vessel disorders (such as aneurysms), recent major injury/surgery, liver disease, alcohol use, mental/mood disorders (including memory problems), frequent falls/injuries. It is important that all your doctors and dentists know that you take  warfarin. Before having surgery or any medical/dental procedures, tell your doctor or dentist that you are taking this medication and about all the products you use (including prescription drugs, nonprescription drugs, and herbal products). Avoid getting injections into the muscles. If you must have an injection into a muscle (for example, a flu shot), it should be given in the arm. This way, it will be easier to check for bleeding and/or apply pressure bandages. This medication may cause stomach bleeding. Daily use of alcohol while using this medicine will increase your risk for stomach bleeding and may also affect how this medication works. Limit or avoid alcoholic beverages. If you have not been eating well, if you have an illness or infection that causes fever, vomiting, or diarrhea for more than 2 days, or if you start using any antibiotic medications, contact your doctor or pharmacist immediately because these conditions can affect how warfarin works. This medication can cause heavy bleeding. To lower the chance of getting cut, bruised, or injured, use great caution with sharp objects like safety razors and nail cutters. Use an electric razor when shaving and a soft toothbrush when brushing your teeth. Avoid activities such as contact sports. If you fall or injure yourself, especially if you hit your head, call your doctor immediately. Your doctor may need to check you. The Food & Drug Administration has stated that generic warfarin products are interchangeable. However, consult your doctor or pharmacist before switching warfarin products. Be careful not to take more than one medication that contains warfarin unless specifically directed by the doctor or health care provider who is monitoring your warfarin treatment. Older adults may be at greater risk for bleeding while using this drug. This medication is not recommended for use during pregnancy because of serious (possibly fatal) harm to an unborn baby.  "Discuss the use of reliable forms of birth control with your doctor. If you become pregnant or think you may be pregnant, tell your doctor immediately. If you are planning pregnancy, discuss a plan for managing your condition with your doctor before you become pregnant. Your doctor may switch the type of medication you use during pregnancy. Very small amounts of this medication may pass into breast milk but is unlikely to harm a nursing infant. Consult your doctor before breast-feeding.      DRUG INTERACTIONS:  Drug interactions may change how your medications work or increase your risk for serious side effects. This document does not contain all possible drug interactions. Keep a list of all the products you use (including prescription/nonprescription drugs and herbal products) and share it with your doctor and pharmacist. Do not start, stop, or change the dosage of any medicines without your doctor's approval. Warfarin interacts with many prescription, nonprescription, vitamin, and herbal products. This includes medications that are applied to the skin or inside the vagina or rectum. The interactions with warfarin usually result in an increase or decrease in the \"blood-thinning\" (anticoagulant) effect. Your doctor or other health care professional should closely monitor you to prevent serious bleeding or clotting problems. While taking warfarin, it is very important to tell your doctor or pharmacist of any changes in medications, vitamins, or herbal products that you are taking. Some products that may interact with this drug include: capecitabine, imatinib, mifepristone. Aspirin, aspirin-like drugs (salicylates), and nonsteroidal anti-inflammatory drugs (NSAIDs such as ibuprofen, naproxen, celecoxib) may have effects similar to warfarin. These drugs may increase the risk of bleeding problems if taken during treatment with warfarin. Carefully check all prescription/nonprescription product labels (including drugs " applied to the skin such as pain-relieving creams) since the products may contain NSAIDs or salicylates. Talk to your doctor about using a different medication (such as acetaminophen) to treat pain/fever. Low-dose aspirin and related drugs (such as clopidogrel, ticlopidine) should be continued if prescribed by your doctor for specific medical reasons such as heart attack or stroke prevention. Consult your doctor or pharmacist for more details. Many herbal products interact with warfarin. Tell your doctor before taking any herbal products, especially bromelains, coenzyme Q10, cranberry, danshen, dong quai, fenugreek, garlic, ginkgo biloba, ginseng, and Minal's wort, among others. This medication may interfere with a certain laboratory test to measure theophylline levels, possibly causing false test results. Make sure laboratory personnel and all your doctors know you use this drug.      OVERDOSE:  If overdose is suspected, contact a poison control center or emergency room immediately.  residents can call the [employer] Poison Hotline at 1-359.736.5858. [place] residents can call a provincial poison control center. Symptoms of overdose may include: bloody/black/tarry stools, pink/dark urine, unusual/prolonged bleeding.      NOTES:  Do not share this medication with others. Laboratory and/or medical tests (such as INR, complete blood count) must be performed periodically to monitor your progress or check for side effects. Consult your doctor for more details.      MISSED DOSE:  For the best possible benefit, do not miss any doses. If you do miss a dose and remember on the same day, take it as soon as you remember. If you remember on the next day, skip the missed dose and resume your usual dosing schedule. Do not double the dose to catch up because this could increase your risk for bleeding. Keep a record of missed doses to give to your doctor or pharmacist. Contact your doctor or pharmacist if you miss 2 or more  doses in a row.      STORAGE:  Store at room temperature away from light and moisture. Do not store in the bathroom. Keep all medications away from children and pets. Do not flush medications down the toilet or pour them into a drain unless instructed to do so. Properly discard this product when it is  or no longer needed. Consult your pharmacist or local waste disposal company for more details about how to safely discard your product.      MEDICAL ALERT:  Your condition and medication can cause complications in a medical emergency. For information about enrolling in MedicAlert, call 1-916.149.7188 ([place]) or 1-961.135.7977 ([place]).      Information last revised 2010 Copyright(c)  [employer]             · Is patient Post Blood Transfusion?  No    Depression / Suicide Risk    As you are discharged from this [facility] facility, it is important to learn how to keep safe from harming yourself.    Recognize the warning signs:  · Abrupt changes in personality, positive or negative- including increase in energy   · Giving away possessions  · Change in eating patterns- significant weight changes-  positive or negative  · Change in sleeping patterns- unable to sleep or sleeping all the time   · Unwillingness or inability to communicate  · Depression  · Unusual sadness, discouragement and loneliness  · Talk of wanting to die  · Neglect of personal appearance   · Rebelliousness- reckless behavior  · Withdrawal from people/activities they love  · Confusion- inability to concentrate     If you or a loved one observes any of these behaviors or has concerns about self-harm, here's what you can do:  · Talk about it- your feelings and reasons for harming yourself  · Remove any means that you might use to hurt yourself (examples: pills, rope, extension cords, firearm)  · Get professional help from the community (Mental Health, Substance Abuse, psychological counseling)  · Do not be alone:Call your Safe  Contact- someone whom you trust who will be there for you.  · Call your local CRISIS HOTLINE 837-7334 or 685-872-7181  · Call your local Children's Mobile Crisis Response Team [place] (975) 023-8797 or www.[employer]  · Call the toll free National Suicide Prevention Hotlines   · National Suicide Prevention Lifeline 266-935-KGGD (5907)  · [employer] Line Network 800-SUICIDE (309-5524)    • Osteomyelitis is secondary to diabetes  • Osteomyelitis is not secondary to diabetes  • Other explanation of clinical findings  Unable to determine  Bone and Joint Infections, Adult  Bone infections (osteomyelitis) and joint infections (septic arthritis) occur when bacteria or other germs get inside a bone or a joint. This can happen if you have an infection in another part of your body that spreads through your blood. Germs from your skin or from outside of your body can also cause this type of infection if you have a wound or a broken bone (fracture) that breaks the skin.  Anyone can get a bone infection or joint infection. You may be more likely to get this type of infection if you have a condition, such as diabetes, that lowers your ability to fight infection or increases your chances of getting an infection. Bone and joint infections can cause damage, and they can spread to other areas of your body. They need to be treated quickly.  CAUSES  Most bone and joint infections are caused by bacteria. They can also be caused by other germs, such as viruses and funguses.  RISK FACTORS  This condition is more likely to develop in:  · People who recently had surgery, especially bone or joint surgery.  · People who have a long-term (chronic) disease, such as:  ¨ HIV (human immunodeficiency virus).  ¨ Diabetes.  ¨ Rheumatoid arthritis.  ¨ Sickle cell anemia.  · Elderly people.  · People who take medicines that block or weaken the body's defense system (immune system).  · People who have a condition that reduces their blood  flow.  · People who are on kidney dialysis.  · People who have an artificial joint.  · People who have had a joint or bone repaired with plates or screws (surgical hardware).  · People who use or abuse IV drugs.  · People who have had trauma, such as stepping on a nail.  SYMPTOMS  Symptoms vary depending on the type and location of your infection. Common symptoms of bone and joint infections include:  · Fever and chills.  · Redness and warmth.  · Swelling.  · Pain and stiffness.  · Drainage of fluid or pus near the infection.  · Weight loss and fatigue.  · Decreased ability to use a hand or foot.  DIAGNOSIS  This condition may be diagnosed based on symptoms, medical history, a physical exam, and diagnostic tests. Tests can help to identify the cause of the infection. You may have various tests, such as:  · A sample of tissue, fluid, or blood taken to be examined under a microscope.  · A procedure to remove fluid from the infected joint with a needle (joint aspiration) for testing in a lab.  · Pus or discharge swabbed from a wound for testing to identify germs and to determine what type of medicine will kill them (culture and sensitivity).  · Blood tests to look for evidence of infection and inflammation (biomarkers).  · Imaging studies to determine how severe the bone or joint infection is. These may include:  ¨ X-rays.  ¨ CT scan.  ¨ MRI.  ¨ Bone scan.  TREATMENT  Treatment depends on the cause and type of infection. Antibiotic medicines are usually the first treatment for a bone or joint infection. Treatment with antibiotics may include:  · Getting IV antibiotics. This may be done in a hospital at first. You may have to continue IV antibiotics at home for several weeks. You may also have to take antibiotics by mouth for several weeks after that.  · Taking more than one kind of antibiotic. Treatment may start with a type of antibiotic that works against many different bacteria (broad spectrum antibiotics). IV  antibiotics may be changed if tests show that another type may work better.  Other treatments may include:  · Draining fluid from the joint by placing a needle into it (aspiration).  · Surgery to remove:  ¨ Dead or dying tissue from a bone or joint.  ¨ An infected artificial joint.  ¨ Infected plates or screws that were used to repair a broken bone.  HOME CARE INSTRUCTIONS  · Take medicines only as directed by your health care provider.  · Take your antibiotic medicine as directed by your health care provider. Finish the antibiotic even if you start to feel better.  · Follow instructions from your health care provider about how to take IV antibiotics at home.  · Ask your health care provider if you have any restrictions on your activities.  · Keep all follow-up visits as directed by your health care provider. This is important.  SEEK MEDICAL CARE IF:  · You have a fever or chills.  · You have redness, warmth, pain, or swelling that returns after treatment.  SEEK IMMEDIATE MEDICAL CARE IF:  · You have rapid breathing or you have trouble breathing.  · You have chest pain.  · You cannot drink fluids or make urine.  · The affected arm or leg swells, changes color, or turns blue.     This information is not intended to replace advice given to you by your health care provider. Make sure you discuss any questions you have with your health care provider.     Document Released: 12/18/2006 Document Revised: 05/03/2016 Document Reviewed: 12/16/2015  [employer] Patient Education ©2016 [employer]

## 2018-01-09 NOTE — PROGRESS NOTES
Patient discharged via medical transport approx 1500.  Discharge instructions reviewed with patient and copy made for him.  Scrips sent with in manila envelope to facility.    Report called to JENNIFER Hodges at [facility]

## 2018-01-09 NOTE — DISCHARGE SUMMARY
CHIEF COMPLAINT ON ADMISSION  Diabetic foot wound    CODE STATUS  Full Code    HPI & HOSPITAL COURSE  62 y.o. male with CAD, COPD, CHF, T2DM, admitted 12/28/2017 with diabetic right foot ulcer and osteomyelitis. S/p surgical debridement, and excision of 4th, 5th MT head, 4th and 5th proximal phalanx (partial). Cultures grew CoNS and diphtheroids. On antibiotics, changed to daptomycin x 6 weeks by ID.  PICC line placed. Insulin adjusted for better BG control. Noted tohave sinus bradycardia, atenolol stopped. Blood pressures acceptable/ at goal on nifedipine. Sugars not quite to goal, metformin increased and resuming humalog (was on Regular SSI in hospital). We discussed smoking cessation again at OR. We discussed opiate pain meds and risks/ reductions in dose at OR.    Therefore, he is discharged in good and stable condition for further post-acute management.     SPECIFIC OUTPATIENT FOLLOW-UP  Wound clinic  Orthopaedics  Infectious Disease    DISCHARGE PROBLEM LIST  Active Problems:    Osteomyelitis of right foot (CMS-HCC) POA: Yes    Sinus bradycardia POA: No    PAOD (peripheral arterial occlusive disease) (CMS-HCC) POA: Yes    Essential hypertension POA: Yes    Coronary artery disease POA: Yes    COPD (chronic obstructive pulmonary disease) (CMS-HCC) POA: Yes    DM (diabetes mellitus) (CMS-HCC) POA: Yes    Chronic CHF (CMS-HCC) POA: Yes    Acquired circulating anticoagulants (CMS-HCC) POA: Yes  Resolved Problems:    * No resolved hospital problems. *      FOLLOW UP  No future appointments.  [facility] ([facility])  [address] [place] [address]  [phone]          MEDICATIONS ON DISCHARGE   Barrett Frederick   [place] Medication Instructions CHERRY:[id number]    Printed on:01/09/18 1220   Medication Information                      acetaminophen (TYLENOL) 325 MG Tab  Take 2 Tabs by mouth every 6 hours as needed (Mild Pain; (Pain scale 1-3); Temp greater than 100.5 F).             aspirin EC (ECOTRIN) 81 MG Tablet  Delayed Response  Take 81 mg by mouth every day.             atorvastatin (LIPITOR) 20 MG Tab  Take 1 Tab by mouth every bedtime.             clopidogrel (PLAVIX) 75 MG Tab  Take 75 mg by mouth every day.             DAPTOmycin 50 mg/mL in syringe  12 mL by Intravenous route every 24 hours.             enoxaparin (LOVENOX) 60 MG/0.6ML Solution inj  Inject 100 mg as instructed every 12 hours. Stop when INR over 2             furosemide (LASIX) 20 MG Tab  Take 0.5 Tabs by mouth every day.             gabapentin (NEURONTIN) 100 MG Cap  Take 1 Cap by mouth 3 times a day.             glimepiride (AMARYL) 2 MG Tab  Take 2 mg by mouth 2 times a day.             insulin glargine (LANTUS) 100 UNIT/ML Solution  Inject 32 Units as instructed every evening.             insulin lispro (HUMALOG) 100 UNIT/ML Solution  Inject 1-6 Units as instructed 3 times a day before meals.             isosorbide dinitrate (ISORDIL) 20 MG Tab  Take 20 mg by mouth every day.             metformin (GLUCOPHAGE) 850 MG Tab  Take 1 Tab by mouth 3 times a day, with meals.             NIFEdipine SR (ADALAT CC) 30 MG CR tablet  Take 1 Tab by mouth every day.             normal saline PF Solution  10-20 mL by Intravenous route as needed (Flush per PICC Line Protocol).             omeprazole (PRILOSEC) 20 MG delayed-release capsule  Take 20 mg by mouth every day.             ondansetron (ZOFRAN ODT) 4 MG TABLET DISPERSIBLE  Take 1 Tab by mouth every four hours as needed (give PO if no IV route available).             oxycodone immediate release (ROXICODONE) 10 MG immediate release tablet  Take 1 Tab by mouth every 6 hours as needed for Moderate Pain or Severe Pain (patient averaging 50-60mg /day last 48 hrs in hosppital, new Rx law will not let me Rx that much at [place]) for up to 7 days.             pentoxifylline CR (TRENTAL) 400 MG CR tablet  Take 400 mg by mouth 3 times a day, with meals.             warfarin (COUMADIN) 10 MG Tab  Take 10 mg by mouth  every day.                 DIET  Orders Placed This Encounter   Procedures   • DIET ORDER     Standing Status:   Standing     Number of Occurrences:   1     Order Specific Question:   Diet:     Answer:   Diabetic [3]     Order Specific Question:   Diet:     Answer:   Cardiac [6]       ACTIVITY  As tolerated in Boot    LINES, DRAINS, AND WOUNDS  This is an automated list. Peripheral IVs will be removed prior to discharge.  Central Line Group 1 (A) Left;Brachial Single Lumen;PICC 4 (Active)   Line Length (cm) 49 cm 1/2/2018  4:00 PM   Line Secured Transparent;Securing Device 1/9/2018  9:00 AM   Patency and Function Check Performed at Beginning of Shift 1/9/2018  9:00 AM   Line Necessity Assessed Antibiotic Therapy Greater than 7 Days 1/9/2018  9:00 AM   Consider Removal of Femoral Line Not Applicable 1/9/2018  9:00 AM   Closed Tubing Set Up No (Comments) 1/8/2018  8:00 PM   Hand Washing / Gloves Prior to Every Access Yes 1/9/2018  9:00 AM   Next Daily Chlorhexidine Bath Due ([facility] ONLY) 01/09/18 1/9/2018  9:00 AM   Port Access  Scrub the Hub Prior to Access 1/9/2018  9:00 AM   Site Condition / Description Assessed;Patent;Clean;Dry;Intact 1/9/2018  9:00 AM   Signs and Symptoms of Infection None Apparent at this Time 1/9/2018  9:00 AM   Dressing Type / Description Antimicrobial Patch (BioPatch);Transparent;Clean;Dry;Intact 1/9/2018  9:00 AM   Dressing Status Observed 1/9/2018  9:00 AM   Next Dressing Change  01/13/18 1/9/2018  9:00 AM       Surgical Incision  Incision Right Foot (Active)   Wound Bed Other (comment) 1/8/2018  8:00 PM   Drainage  None 1/8/2018  8:00 PM   Periwound Skin Pink;Normal;Intact 1/8/2018  8:00 PM   Daily - Wound Closure Not Assessed 1/8/2018  8:00 PM   Dressing Options Hydrofiber Silver;Roll Gauze;Foam;Tape;Other (Comments) 1/8/2018  8:00 PM   Dressing Status / Change Clean;Dry;Intact;Observed 1/8/2018  8:00 PM   Daily - Dressing Change Observed 1/8/2018  8:00 PM   Dressing Change Frequency  Daily 1/8/2018  8:00 PM   Next Dressing Change  01/08/18 1/8/2018  5:30 PM   Time Spent with Patient (mins) 90 1/6/2018  3:00 PM       Surgical Incision  Incision Right Leg Lower (Active)   Wound Bed Other (comment) 1/8/2018  8:00 PM   Drainage  None 1/8/2018  8:00 PM   Periwound Skin Pink;Normal;Intact 1/8/2018  8:00 PM   Daily - Wound Closure Not Assessed 1/8/2018  8:00 PM   Dressing Options Foam;Tape;Roll Gauze;Other (Comments) 1/8/2018  8:00 PM   Dressing Status / Change Clean;Dry;Intact;Observed 1/8/2018  8:00 PM   Daily - Dressing Change Observed 1/8/2018  8:00 PM   Dressing Change Frequency Every 72 hrs 1/8/2018  8:00 PM   Next Dressing Change  01/09/18 1/8/2018  5:30 PM       Wound POA Diabetic Ulcer Foot Right Plantar (Active)   Wound Bed Other (comment) 1/8/2018  8:00 PM   Drainage  None 1/8/2018  8:00 PM   Periwound Skin Pink;Normal;Intact;Calloused 1/8/2018  8:00 PM   Cleansing Normal Saline Irrigation 1/8/2018  8:00 PM   Dressing Options Hydrofiber Silver;Other (Comments);Foam;Roll Gauze;Tape 1/8/2018  8:00 PM   Dressing Status / Change Clean;Dry;Intact;Observed 1/8/2018  8:00 PM   Dressing Cleansing/Solutions Normal Saline 1/8/2018  8:00 PM   Periwound Protectant Skin Protectant wipes to Periwound 1/8/2018  8:00 PM   Length (cm) 0.6 12/29/2017  6:30 AM   Width (cm) 0.8 12/29/2017  6:30 AM   Depth (cm) 2.2 12/29/2017  6:30 AM   Weekly Photo (Inpatient Only) 12/29/17 12/29/2017  6:30 AM   Dressing Change Frequency Daily 1/6/2018  8:40 PM   Next Dressing Change  01/07/18 1/6/2018  8:40 PM   Time Spent with Patient (mins) 75 1/2/2018 11:00 AM                  MENTAL STATUS ON TRANSFER  Level of Consciousness: Alert  Orientation : Oriented x 4  Speech: Speech Clear    CONSULTATIONS  Infectious Disease- [facility]  Orthopaedics- Masoud Villegas MD    PROCEDURES  DATE OF SERVICE:  12/29/2017     PREOPERATIVE DIAGNOSES:  1.  Right fourth and fifth metatarsal head osteomyelitis.  2.  Right fourth plantar  metatarsal head ulceration.     POSTOPERATIVE DIAGNOSES:  1.  Right fourth and fifth metatarsal head osteomyelitis.  2.  Right fourth plantar metatarsal head ulceration.     PROCEDURES PERFORMED:  1.  Irrigation and debridement, right foot and skin, subcutaneous tissue to   fascia plantar ulceration.  2.  Right fourth metatarsal head excision.  3.  Right fifth metatarsal head excision.  4.  Right fourth proximal phalanx partial excision.  5.  Right fifth proximal phalanx partial excision.  6.  Right gastrocsoleus recession.         LABORATORY  Lab Results   Component Value Date/Time    SODIUM 134 (L) 01/02/2018 02:45 AM    POTASSIUM 3.7 01/02/2018 02:45 AM    CHLORIDE 105 01/02/2018 02:45 AM    CO2 24 01/02/2018 02:45 AM    GLUCOSE 175 (H) 01/02/2018 02:45 AM    BUN 12 01/02/2018 02:45 AM    CREATININE 0.74 01/02/2018 02:45 AM    CREATININE 0.9 03/15/2005 07:50 AM        Lab Results   Component Value Date/Time    WBC 8.7 01/02/2018 02:44 AM    HEMOGLOBIN 10.6 (L) 01/02/2018 02:44 AM    HEMATOCRIT 32.7 (L) 01/02/2018 02:44 AM    PLATELETCT 174 01/02/2018 02:44 AM        Total time of the discharge process exceeds 40 minutes.

## 2018-01-09 NOTE — DISCHARGE PLANNING
CCS received a message from Ree gilliam [facility] the referral has been accepted Pending bed availability.

## 2018-01-09 NOTE — HEART FAILURE PROGRAM
Please note, per Dr. Mcconnell, 1/8, patient still not in acute HF.  Therefore, a 7 day HF follow up appointment prior to discharge is not indicated at this time.   Please call [facility] Nurse Navigator with any questions: [phone]. Thank you.

## 2018-01-09 NOTE — DISCHARGE PLANNING
CCS received a call from Raul at [facility] the referral has been denied medication cost is to high

## 2018-01-09 NOTE — DISCHARGE PLANNING
Pt is agreeable to transfer to [facility].  Pt signed the COBRA form.

## 2018-01-09 NOTE — CARE PLAN
Problem: Knowledge Deficit  Goal: Knowledge of disease process/condition, treatment plan, diagnostic tests, and medications will improve    Intervention: Assess knowledge level of disease process/condition, treatment plan, diagnostic tests, and medications  KReviewing plan of care, activities, and medication with patient.  Encouraging patient to ask questions and participate in plan of care.  Providing answers to all questions.  Continuing with current plan of care.  Hourly rounding in practice.      Problem: Pain Management  Goal: Pain level will decrease to patient's comfort goal    Intervention: Follow pain managment plan developed in collaboration with patient and Interdisciplinary Team  Assessing pain level frequently using 0 to 10 scale.  Providing medication per MAR.  Providing non-pharmacological intervention, therapeutic communication.  Hourly rounding in practice.

## 2018-01-19 ENCOUNTER — OFFICE VISIT (OUTPATIENT)
Dept: WOUND CARE | Facility: MEDICAL CENTER | Age: 63
End: 2018-01-19
Attending: INTERNAL MEDICINE
Payer: MEDICARE

## 2018-01-19 PROCEDURE — 97602 WOUND(S) CARE NON-SELECTIVE: CPT

## 2018-01-19 NOTE — WOUND TEAM
Pt seen during ortho rounds with LPS team for RLE plantar wounds with surgical incision approximated to dorsal  Between 4th and 5th toes with slight opening between toes.  Measurements(cm): 0.5 x 0.4 x 2.0 with cavernous tract of 3.0 (to other side of foot right beneath skin level). Following physician assessment, pt wound was evaluated, sutures removed to RLE plantar surgical incision, non selective to both areas using cotton tip applicator, and both dressed with AqAg rope covered with non-adhesive foam and secured with hypafix tape.

## 2018-01-25 ENCOUNTER — OFFICE VISIT (OUTPATIENT)
Dept: INFECTIOUS DISEASES | Facility: MEDICAL CENTER | Age: 63
End: 2018-01-25
Payer: MEDICARE

## 2018-01-25 VITALS
SYSTOLIC BLOOD PRESSURE: 120 MMHG | HEART RATE: 76 BPM | DIASTOLIC BLOOD PRESSURE: 60 MMHG | BODY MASS INDEX: 32.47 KG/M2 | TEMPERATURE: 97.8 F | OXYGEN SATURATION: 97 % | HEIGHT: 69 IN | WEIGHT: 219.2 LBS

## 2018-01-25 DIAGNOSIS — Z72.0 TOBACCO ABUSE: ICD-10-CM

## 2018-01-25 DIAGNOSIS — Z16.29 METHICILLIN RESISTANT STAPHYLOCOCCUS EPIDERMIDIS INFECTION: ICD-10-CM

## 2018-01-25 DIAGNOSIS — A49.8 METHICILLIN RESISTANT STAPHYLOCOCCUS EPIDERMIDIS INFECTION: ICD-10-CM

## 2018-01-25 DIAGNOSIS — E11.8 TYPE 2 DIABETES MELLITUS WITH COMPLICATION, UNSPECIFIED LONG TERM INSULIN USE STATUS: ICD-10-CM

## 2018-01-25 DIAGNOSIS — M86.271 SUBACUTE OSTEOMYELITIS OF RIGHT FOOT (HCC): ICD-10-CM

## 2018-01-25 PROCEDURE — 99214 OFFICE O/P EST MOD 30 MIN: CPT | Performed by: NURSE PRACTITIONER

## 2018-01-25 NOTE — PROGRESS NOTES
Infectious Disease Clinic    Subjective:     Chief Complaint   Patient presents with   • Hospital Follow-up     Diabetic foot infection     This is my first time meeting Mr. Frederick.  He is currently residing at [facility].    Interval History: 62 y.o. male with CAD, COPD, CHF, poorly controlled T2DM.  Hospitalized from 12/28/17- 1/9/18, admitted for diabetic right foot ulcer and osteomyelitis.  Pt brought in from [place] d/t complaints of leg pain and numbness x 7 days with concern for OM of R foot.  Xray of foot on 12/28 showed possible OM of the R 4th metatarsal base. Arterial duplex on 12/29 showed Left peroneal artery was occluded and the right posterior tibial & peroneal artery were nearly occluded.  On 12/29, the pt underwent 4th & 5th metatarsal head excision and a R GSR with Dr. Villegas.  OR cx +MRSE and diphtheroids.  Pt discharged to a SNF on IV Daptomycin through 2/9/18, but could complete the last 2 weeks of abx with PO Zyvox.    Hospital records reviewed    Today, 1/25/2018: Patient reports feeling well and has been tolerating the IV Daptomycin without adverse effect.  Denies feeling generally ill, fevers/chills, general malaise, headache, n/v/d, abdominal pain, chest pain or shortness of breath.  Pt stating that the wound is healing well and is very anxious to discharge home to [place] tomorrow. Pt plans to have his wound care done by his Physical Therapist in [place], who has agreed to do the wound care (per the pt).  Pt denies wounds having excessive drainage, no odor, no redness, no pain/tenderness and no swelling.  BS have been in the 100 to low 200s.  Smoking about 3-4 cigarettes a day.    ROS  As documented above in my HPI.    Past Medical History:   Diagnosis Date   • CAD (coronary artery disease)    • Chronic obstructive pulmonary disease (CMS-[facility])    • Congestive heart failure (CMS-[facility])    • Diabetes (CMS-[facility])        Social History   Substance Use Topics   • Smoking status: Current Every Day Smoker     " Packs/day: 2.00     Types: Cigarettes   • Smokeless tobacco: Never Used   • Alcohol use Yes       Allergies: Patient has no known allergies.    Pt's medication and problem list reviewed.     Objective:     PE:  /60   Pulse 76   Temp 36.6 °C (97.8 °F)   Ht 1.753 m (5' 9\")   Wt 99.4 kg (219 lb 3.2 oz)   SpO2 97%   BMI 32.37 kg/m²     Vital signs reviewed    Constitutional: Appears well-developed and well-nourished. No acute distress.  Speech fluent.    Eyes: Conjunctivae normal and EOM are normal. Pupils are equal, round, and reactive to light.   Neck: Trachea midline. Normal range of motion. No JVD.  Cardiovascular: Normal rate, regular rhythm, normal heart sounds and intact distal pulses. No murmur, gallop, or friction rub. Trace RLE edema.  Respiratory: No respiratory distress, unlabored respiratory effort.  Lungs clear to auscultation bilaterally. No wheezes or rales.   Abdomen: Soft, non tender, non-distended. BS + x 4. No masses.   Musculoskeletal: Steady gait.  Walking boot to R foot.  Limited range of motion to RLE.   R GSR- unable to visualize d/t dressing.  Per pt, healing without issue.  R dorsum, 4th webspace- size of a dime, scabbed over, no drainage, no odor, no surrounding erythema.  R plantar, 4th met- size of an eraser tip, packing in place, minimal ss drainage, no odor, no surrounding erythema.  LUE PICC- non tender, no erythema.  Skin: Warm and dry.No visible rashes or lesions.  Psychiatric: Alert and oriented to person, place, and time. Normal mood, calm affect.  Normal behavior and judgment.     Assessment and Plan:   The following treatment plan was discussed with patient at length:    1. Subacute osteomyelitis of right foot (CMS-HCC)  CBC WITHOUT DIFFERENTIAL    -Ok to transition pt to PO Zyvox x 2 weeks, starting tomorrow, to complete 6 week course of abx.  Ok to d/c home once Zyvox script completed by [facility].  Discussed dosing and side effects of medications being prescribed.  " Pt instructed to call clinic with any adverse effects or to discontinue the medication and go to the ER should difficulty breathing, SOB, CP, facial swelling and/or gross rash/itching occurs.   -Monitor for s/sx of worsening transitioning from IV to PO abx: increased redness, pain, swelling, drainage, breakdown of surgical site, fevers, chills, general malaise, etc.  Notify ID or go to ER should these s/sx occur.  -Fax recent lab work to ID.  -D/C PICC prior to discharge.  -Weekly CBC to monitor for thrombocytopenia while on Zyvox.  2. Methicillin resistant Staphylococcus epidermidis infection      As above.      3. Type 2 diabetes mellitus with complication, unspecified long term insulin use status (CMS-HCC)      HgA1C 9.7% on 12/28/17.  Continue to monitor blood sugars closely as DM will impair wound healing and can worsen infection.  Discussed diabetic foot care.  Advised to not go barefoot, check feet everyday and to check the inside of shoes before putting them on. Recommended pt to call the clinic immediately with any new and/or worsening foot injuries and/or signs of infection.     4. Tobacco abuse      +tobacco screen.  Discussed the negative consequences of tobacco use.      Follow up: 3 weeks, RTC sooner if needed. FU with PCP for ongoing chronic medical conditions.     SCOTTY Rosen.

## 2018-02-09 ENCOUNTER — APPOINTMENT (OUTPATIENT)
Dept: WOUND CARE | Facility: MEDICAL CENTER | Age: 63
End: 2018-02-09
Attending: INTERNAL MEDICINE
Payer: MEDICARE

## 2023-10-26 NOTE — PROGRESS NOTES
Goal Outcome Evaluation:      Plan of Care Reviewed With: patient, spouse           1530: razia Shannon pt has thornton. do you want urology consult for this? flomax? do you think he will leave and go to [facility] with this or take out before leaving?     VS-afebrile, stable, increased pain when moving. +tremors.   Lung Sounds-clear, diminished bases. No cough noted. On room air. Using IS upto [id number]  O2-on room air.   GI- +bs, +Flatus. Lbm Tuesday per pt. Tolerating toast, omelet, bs was 167. One unit of insulin given   -thornton in and adequate   IVF-iv sl. Flushed well.   Dressings-aquacel on back. HV in place.   CMS-has tremors in arms. +radial pulse. Denies numbness and tingling, +pp. Mod dorsi/planter flexion.   Drain-hv compressed.   Activity-turned with 2 assist. Working with PT and OT. 2 assist. Walker, belt. Walking around bed several times. Increased pain with activity. Up in chair.   Pain-on oxycodone, atarax, robaxin, tylenol. And ice packs. No pain or very minimal pain at rest. Pain increased to 6-7 with any activity. Including turning or lowering or increasing hob.   D/C Plan-sws following . Looking at [facility]. Wife here this am. Will watch therapy this afternoon.    Drain output was <50cc. Can remove HV. Pt working with therapy.   Notified provider about indwelling thornton catheter discussed removal or continued need.    Did provider choose to remove indwelling thornton catheter? NO    Provider's thornton indication for keeping indwelling thornton catheter: Indication for continued use: Retention    Is there an order for indwelling thornton catheter? YES--10/24/23  Plan to go to [facility] and do voiding trial there. Per Dr. Shannon    *If there is a plan to keep thornton catheter in place at discharge daily notification with provider is not necessary, but please add a notation in the treatment team sticky note that the patient will be discharging with the catheter.     LIMB PRESERVATION SERVICE ROUNDS    Patient seen in collaboration with interdisciplinary team during LPS rounds in wound clinic for R foot. S/p R GSR, R foot I & D, 4th and 5th MTH excision, 4th and 5th proximal phalanx excision on 12/29/17 by Dr. Villegas. Presents today from [facility] for incision evaluation and suture removal.   Following up with ID next week. Remains on IV abx  Patient states blood sugars are averaging 110s. Pt wearing offloading boot continuously.     OBJECTIVE FINDINGS:     Labs/diagnostic reviewed: n/a      Incision approximated with sutures to R calf.   Incision approximated with sutures to dorsal 4th webspace. Slight dehiscence noted.   Plantar 4th met wound:   Opening has decreased in size, but depth is at 3cm tracking toward medial aspect.   Scant s/s drainage    PROCEDURE   Sutures removed to calf and dorsal foot and Wound care completed by Elsa Wise RN    PLAN:   Wound Care: orders placed for [facility] to change aquacel ag packing to plantar foot daily  Imaging: n/a  Offloading: continue offloading boot for 3 more weeks. Ok to remove for ankle ROM and exercises 3x/day. Rx to [facility] for shoes and inserts given. Ability to see pt at [facility].   Antibiotics: continue per ID  Surgery: n/a  Referral: n/a      LPS Follow up: 3wk if not healed. Pt requesting to discharge home. Pt lives in [place], [place]. States PT will be able to perform drsg changes in [place] for him.

## (undated) DEVICE — SUTURE 2-0 ETHILON FS - (36/BX) 18 INCH

## (undated) DEVICE — SUTURE 3-0 ETHILON FS-1 - (36/BX) 30 INCH

## (undated) DEVICE — SUCTION INSTRUMENT YANKAUER BULBOUS TIP W/O VENT (50EA/CA)

## (undated) DEVICE — PACK LOWER EXTREMITY - (2/CA)

## (undated) DEVICE — WRAP CO-FLEX 4IN X 5YD STERIL - SELF-ADHERENT (18/CA)

## (undated) DEVICE — TRAY SKIN SCRUB PVP WET (20EA/CA) PART #DYND70356 DISCONTINUED

## (undated) DEVICE — LACTATED RINGERS INJ 1000 ML - (14EA/CA 60CA/PF)

## (undated) DEVICE — ELECTRODE 850 FOAM ADHESIVE - HYDROGEL RADIOTRNSPRNT (50/PK)

## (undated) DEVICE — HEAD HOLDER JUNIOR/ADULT

## (undated) DEVICE — CONTAINER SPECIMEN BAG OR - STERILE 4 OZ W/LID (100EA/CA)

## (undated) DEVICE — KIT ANESTHESIA W/CIRCUIT & 3/LT BAG W/FILTER (20EA/CA)

## (undated) DEVICE — SUTURE GENERAL

## (undated) DEVICE — GLOVE BIOGEL SZ 6.5 SURGICAL PF LTX (50PR/BX 4BX/CA)

## (undated) DEVICE — STRIP PACKING STERILE 1/4 IN - 1/4 IN X 5 YDS (IODOFORM)

## (undated) DEVICE — Device

## (undated) DEVICE — BLADE SURGICAL #15 - (50/BX 3BX/CA)

## (undated) DEVICE — SUTURE 3-0 VICRYL PLUS SH - 8X 18 INCH (12/BX)

## (undated) DEVICE — GLOVE SZ 6.5 BIOGEL PI MICRO - PF LF (50PR/BX)

## (undated) DEVICE — TUBING CLEARLINK DUO-VENT - C-FLO (48EA/CA)

## (undated) DEVICE — NEPTUNE 4 PORT MANIFOLD - (20/PK)

## (undated) DEVICE — MASK ANESTHESIA ADULT  - (100/CA)

## (undated) DEVICE — BANDAGE ELASTIC 4 HONEYCOMB - 4"X5YD LF (20/CA)"

## (undated) DEVICE — SET EXTENSION WITH 2 PORTS (48EA/CA) ***PART #2C8610 IS A SUBSTITUTE*****

## (undated) DEVICE — SET IRRIGATION CYSTOSCOPY TUBE L80 IN (20EA/CA)

## (undated) DEVICE — GLOVE BIOGEL PI INDICATOR SZ 7.0 SURGICAL PF LF - (50/BX 4BX/CA)

## (undated) DEVICE — GLOVE BIOGEL INDICATOR SZ 8.5 SURGICAL PF LTX - (50/BX 4BX/CA)

## (undated) DEVICE — SET LEADWIRE 5 LEAD BEDSIDE DISPOSABLE ECG (1SET OF 5/EA)

## (undated) DEVICE — MASK, LARYNGEAL AIRWAY #4

## (undated) DEVICE — SLEEVE, VASO, THIGH, MED

## (undated) DEVICE — SPONGE GAUZESTER 4 X 4 4PLY - (128PK/CA)

## (undated) DEVICE — DRESSING XEROFORM 1X8 - (50/BX 4BX/CA)

## (undated) DEVICE — GLOVE BIOGEL ECLIPSE  PF LATEX SIZE 6.5 (50PR/BX)

## (undated) DEVICE — PROTECTOR ULNA NERVE - (36PR/CA)

## (undated) DEVICE — SODIUM CHL IRRIGATION 0.9% 1000ML (12EA/CA)

## (undated) DEVICE — DRESSING TRANSPARENT FILM TEGADERM 4 X 4.75" (50EA/BX)"

## (undated) DEVICE — GLOVE BIOGEL SZ 8 SURGICAL PF LTX - (50PR/BX 4BX/CA)

## (undated) DEVICE — SENSOR SPO2 NEO LNCS ADHESIVE (20/BX) SEE USER NOTES

## (undated) DEVICE — CANISTER SUCTION 3000ML MECHANICAL FILTER AUTO SHUTOFF MEDI-VAC NONSTERILE LF DISP  (40EA/CA)